# Patient Record
Sex: FEMALE | Race: AMERICAN INDIAN OR ALASKA NATIVE | HISPANIC OR LATINO | URBAN - METROPOLITAN AREA
[De-identification: names, ages, dates, MRNs, and addresses within clinical notes are randomized per-mention and may not be internally consistent; named-entity substitution may affect disease eponyms.]

---

## 2017-03-21 ENCOUNTER — EMERGENCY (EMERGENCY)
Facility: HOSPITAL | Age: 57
LOS: 1 days | Discharge: HOME | End: 2017-03-21
Attending: EMERGENCY MEDICINE | Admitting: EMERGENCY MEDICINE
Payer: COMMERCIAL

## 2017-03-21 VITALS
HEART RATE: 96 BPM | OXYGEN SATURATION: 96 % | WEIGHT: 179.9 LBS | DIASTOLIC BLOOD PRESSURE: 92 MMHG | TEMPERATURE: 98 F | RESPIRATION RATE: 18 BRPM | SYSTOLIC BLOOD PRESSURE: 137 MMHG

## 2017-03-21 DIAGNOSIS — Z98.89 OTHER SPECIFIED POSTPROCEDURAL STATES: Chronic | ICD-10-CM

## 2017-03-21 DIAGNOSIS — Z87.891 PERSONAL HISTORY OF NICOTINE DEPENDENCE: ICD-10-CM

## 2017-03-21 DIAGNOSIS — M79.602 PAIN IN LEFT ARM: ICD-10-CM

## 2017-03-21 DIAGNOSIS — Z90.49 ACQUIRED ABSENCE OF OTHER SPECIFIED PARTS OF DIGESTIVE TRACT: Chronic | ICD-10-CM

## 2017-03-21 DIAGNOSIS — Z91.041 RADIOGRAPHIC DYE ALLERGY STATUS: ICD-10-CM

## 2017-03-21 DIAGNOSIS — M25.571 PAIN IN RIGHT ANKLE AND JOINTS OF RIGHT FOOT: ICD-10-CM

## 2017-03-21 DIAGNOSIS — J45.909 UNSPECIFIED ASTHMA, UNCOMPLICATED: ICD-10-CM

## 2017-03-21 DIAGNOSIS — M79.671 PAIN IN RIGHT FOOT: ICD-10-CM

## 2017-03-21 DIAGNOSIS — M25.572 PAIN IN LEFT ANKLE AND JOINTS OF LEFT FOOT: ICD-10-CM

## 2017-03-21 DIAGNOSIS — R07.9 CHEST PAIN, UNSPECIFIED: ICD-10-CM

## 2017-03-21 DIAGNOSIS — M79.672 PAIN IN LEFT FOOT: ICD-10-CM

## 2017-03-21 LAB
ALBUMIN SERPL ELPH-MCNC: 3.8 G/DL — SIGNIFICANT CHANGE UP (ref 3.4–5)
ALP SERPL-CCNC: 93 U/L — SIGNIFICANT CHANGE UP (ref 40–120)
ALT FLD-CCNC: 29 U/L — SIGNIFICANT CHANGE UP (ref 12–42)
ANION GAP SERPL CALC-SCNC: 7 MMOL/L — LOW (ref 9–16)
APTT BLD: 33 SEC — SIGNIFICANT CHANGE UP (ref 27.5–37.4)
AST SERPL-CCNC: 14 U/L — LOW (ref 15–37)
BASOPHILS NFR BLD AUTO: 0.4 % — SIGNIFICANT CHANGE UP (ref 0–2)
BILIRUB SERPL-MCNC: 0.5 MG/DL — SIGNIFICANT CHANGE UP (ref 0.2–1.2)
BUN SERPL-MCNC: 13 MG/DL — SIGNIFICANT CHANGE UP (ref 7–23)
CALCIUM SERPL-MCNC: 8.8 MG/DL — SIGNIFICANT CHANGE UP (ref 8.5–10.5)
CHLORIDE SERPL-SCNC: 105 MMOL/L — SIGNIFICANT CHANGE UP (ref 96–108)
CK MB CFR SERPL CALC: 1.2 NG/ML — SIGNIFICANT CHANGE UP (ref 0.5–3.6)
CK SERPL-CCNC: 76 U/L — SIGNIFICANT CHANGE UP (ref 26–192)
CO2 SERPL-SCNC: 29 MMOL/L — SIGNIFICANT CHANGE UP (ref 22–31)
CREAT SERPL-MCNC: 0.54 MG/DL — SIGNIFICANT CHANGE UP (ref 0.5–1.3)
EOSINOPHIL NFR BLD AUTO: 2.6 % — SIGNIFICANT CHANGE UP (ref 0–6)
EXTRA SST TUBE: SIGNIFICANT CHANGE UP
GLUCOSE SERPL-MCNC: 109 MG/DL — HIGH (ref 70–99)
HCT VFR BLD CALC: 37.2 % — SIGNIFICANT CHANGE UP (ref 34.5–45)
HGB BLD-MCNC: 12.9 G/DL — SIGNIFICANT CHANGE UP (ref 11.5–15.5)
INR BLD: 1.03 — SIGNIFICANT CHANGE UP (ref 0.88–1.16)
LYMPHOCYTES # BLD AUTO: 32.5 % — SIGNIFICANT CHANGE UP (ref 13–44)
MCHC RBC-ENTMCNC: 30.2 PG — SIGNIFICANT CHANGE UP (ref 27–34)
MCHC RBC-ENTMCNC: 34.7 G/DL — SIGNIFICANT CHANGE UP (ref 32–36)
MCV RBC AUTO: 87.1 FL — SIGNIFICANT CHANGE UP (ref 80–100)
MONOCYTES NFR BLD AUTO: 8.7 % — SIGNIFICANT CHANGE UP (ref 2–14)
NEUTROPHILS NFR BLD AUTO: 55.8 % — SIGNIFICANT CHANGE UP (ref 43–77)
PLATELET # BLD AUTO: 265 K/UL — SIGNIFICANT CHANGE UP (ref 150–400)
POTASSIUM SERPL-MCNC: 4.3 MMOL/L — SIGNIFICANT CHANGE UP (ref 3.5–5.3)
POTASSIUM SERPL-SCNC: 4.3 MMOL/L — SIGNIFICANT CHANGE UP (ref 3.5–5.3)
PROT SERPL-MCNC: 7.9 G/DL — SIGNIFICANT CHANGE UP (ref 6.4–8.2)
PROTHROM AB SERPL-ACNC: 11.4 SEC — SIGNIFICANT CHANGE UP (ref 9.8–12.7)
RBC # BLD: 4.27 M/UL — SIGNIFICANT CHANGE UP (ref 3.8–5.2)
RBC # FLD: 12.9 % — SIGNIFICANT CHANGE UP (ref 10.3–16.9)
SODIUM SERPL-SCNC: 141 MMOL/L — SIGNIFICANT CHANGE UP (ref 135–145)
TROPONIN I SERPL-MCNC: <0.015 NG/ML — SIGNIFICANT CHANGE UP (ref 0.01–0.04)
WBC # BLD: 7 K/UL — SIGNIFICANT CHANGE UP (ref 3.8–10.5)
WBC # FLD AUTO: 7 K/UL — SIGNIFICANT CHANGE UP (ref 3.8–10.5)

## 2017-03-21 PROCEDURE — 71010: CPT | Mod: 26

## 2017-03-21 PROCEDURE — 85730 THROMBOPLASTIN TIME PARTIAL: CPT

## 2017-03-21 PROCEDURE — 71045 X-RAY EXAM CHEST 1 VIEW: CPT

## 2017-03-21 PROCEDURE — 93010 ELECTROCARDIOGRAM REPORT: CPT

## 2017-03-21 PROCEDURE — 82550 ASSAY OF CK (CPK): CPT

## 2017-03-21 PROCEDURE — 80053 COMPREHEN METABOLIC PANEL: CPT

## 2017-03-21 PROCEDURE — 93005 ELECTROCARDIOGRAM TRACING: CPT | Mod: 77

## 2017-03-21 PROCEDURE — 85025 COMPLETE CBC W/AUTO DIFF WBC: CPT

## 2017-03-21 PROCEDURE — 84484 ASSAY OF TROPONIN QUANT: CPT

## 2017-03-21 PROCEDURE — 99285 EMERGENCY DEPT VISIT HI MDM: CPT | Mod: 25

## 2017-03-21 PROCEDURE — 83036 HEMOGLOBIN GLYCOSYLATED A1C: CPT

## 2017-03-21 PROCEDURE — 93010 ELECTROCARDIOGRAM REPORT: CPT | Mod: 77

## 2017-03-21 PROCEDURE — 82553 CREATINE MB FRACTION: CPT

## 2017-03-21 PROCEDURE — 36415 COLL VENOUS BLD VENIPUNCTURE: CPT

## 2017-03-21 PROCEDURE — 99283 EMERGENCY DEPT VISIT LOW MDM: CPT | Mod: 25

## 2017-03-21 PROCEDURE — 85610 PROTHROMBIN TIME: CPT

## 2017-03-21 RX ORDER — SODIUM CHLORIDE 9 MG/ML
3 INJECTION INTRAMUSCULAR; INTRAVENOUS; SUBCUTANEOUS ONCE
Qty: 0 | Refills: 0 | Status: COMPLETED | OUTPATIENT
Start: 2017-03-21 | End: 2017-03-21

## 2017-03-21 RX ADMIN — SODIUM CHLORIDE 3 MILLILITER(S): 9 INJECTION INTRAMUSCULAR; INTRAVENOUS; SUBCUTANEOUS at 15:48

## 2017-03-21 NOTE — ED ADULT NURSE NOTE - OBJECTIVE STATEMENT
57 y/o female c/o intermittent midsternal chest pain radiating down the left arm and SOB on exertion for the last two days. pt took 3 PO ibuprofen this morning around 10am with minimal relief. pt also c/o bilateral foot pain for the least two months with swelling to bilateral outer ankles. denies any n/v/d, fever/chills, medica hx, daily medications, palpitations, headache. EKG done, placed on continuous cardiac monitor, no acute distress, ambulates independently. continue to monitor.

## 2017-03-21 NOTE — ED ADULT TRIAGE NOTE - CHIEF COMPLAINT QUOTE
patient complains of chest pressure radiating down left arm x2 days. patient took ibuprofen with no relief. also complains of pain in her feet. denies medical problems.

## 2017-03-21 NOTE — ED PROVIDER NOTE - MEDICAL DECISION MAKING DETAILS
Arm pain very unlikely cardiac as it is unrelated to exertion and all labs/EKG are unremarkable; likely secondary to cervical nerve issue. B/L heel pain likely 2/2 MSK issue and not nerve related. Both issues best addressed as outpatient by either pain management and/or podiatry.

## 2017-03-21 NOTE — ED ADULT NURSE NOTE - CHPI ED SYMPTOMS NEG
no syncope/no vomiting/no nausea/no fever/no dizziness/no chills/no back pain/no cough/no diaphoresis

## 2017-03-21 NOTE — ED PROVIDER NOTE - ATTENDING CONTRIBUTION TO CARE
56 F co L arm pain- had an episode of palpitations over the weekend for about 1 min  episodic shooting pain down her L arm- nonexertional  no exac/allev factors  no weakness  vss  s1s2 lungs cta bl  abd soft nt nd +bs  cervical strength 5/5 BL  IMP- Arm pain  ekg, labs  chronic heel pain

## 2017-03-21 NOTE — ED PROVIDER NOTE - OBJECTIVE STATEMENT
55 yo woman with hx of intermittent asthma presents with episodic shooting pain down left arm and b/l foot pain in heels. Pt feels the issues are unrelated. Two days ago, pt was shopping and had very brief episode of palpitations accompanied by mild chest pain. The palpation were intense and forced patient to stop what she was doing. The palpitation resolved in less than a minutes and patient returned to baseline. However, since the event she has had episodic, shooting pain/tingling down legs arm. Nothing triggers the pain; it can happen both at rest and with exertion. Nothing makes it worse or better.   The pain in the feet has been occurring for 2 months. 8/10 pain in heels and lateral ankles, most notable after

## 2017-12-15 ENCOUNTER — EMERGENCY (EMERGENCY)
Facility: HOSPITAL | Age: 57
LOS: 1 days | Discharge: HOME | End: 2017-12-15
Attending: EMERGENCY MEDICINE | Admitting: EMERGENCY MEDICINE
Payer: SELF-PAY

## 2017-12-15 VITALS
DIASTOLIC BLOOD PRESSURE: 87 MMHG | TEMPERATURE: 99 F | HEART RATE: 124 BPM | SYSTOLIC BLOOD PRESSURE: 145 MMHG | RESPIRATION RATE: 22 BRPM | OXYGEN SATURATION: 100 %

## 2017-12-15 VITALS
RESPIRATION RATE: 18 BRPM | TEMPERATURE: 99 F | SYSTOLIC BLOOD PRESSURE: 129 MMHG | OXYGEN SATURATION: 99 % | HEART RATE: 108 BPM | DIASTOLIC BLOOD PRESSURE: 85 MMHG

## 2017-12-15 DIAGNOSIS — Z91.041 RADIOGRAPHIC DYE ALLERGY STATUS: ICD-10-CM

## 2017-12-15 DIAGNOSIS — Z87.891 PERSONAL HISTORY OF NICOTINE DEPENDENCE: ICD-10-CM

## 2017-12-15 DIAGNOSIS — Z90.49 ACQUIRED ABSENCE OF OTHER SPECIFIED PARTS OF DIGESTIVE TRACT: Chronic | ICD-10-CM

## 2017-12-15 DIAGNOSIS — Z98.89 OTHER SPECIFIED POSTPROCEDURAL STATES: Chronic | ICD-10-CM

## 2017-12-15 DIAGNOSIS — J45.901 UNSPECIFIED ASTHMA WITH (ACUTE) EXACERBATION: ICD-10-CM

## 2017-12-15 DIAGNOSIS — J45.21 MILD INTERMITTENT ASTHMA WITH (ACUTE) EXACERBATION: ICD-10-CM

## 2017-12-15 PROCEDURE — 94640 AIRWAY INHALATION TREATMENT: CPT

## 2017-12-15 PROCEDURE — 71010: CPT | Mod: 26

## 2017-12-15 PROCEDURE — 99285 EMERGENCY DEPT VISIT HI MDM: CPT | Mod: 25

## 2017-12-15 PROCEDURE — 96375 TX/PRO/DX INJ NEW DRUG ADDON: CPT

## 2017-12-15 PROCEDURE — 96374 THER/PROPH/DIAG INJ IV PUSH: CPT

## 2017-12-15 PROCEDURE — 99285 EMERGENCY DEPT VISIT HI MDM: CPT

## 2017-12-15 PROCEDURE — 71045 X-RAY EXAM CHEST 1 VIEW: CPT

## 2017-12-15 RX ORDER — EPINEPHRINE 0.3 MG/.3ML
3 INJECTION INTRAMUSCULAR; SUBCUTANEOUS
Qty: 2 | Refills: 0
Start: 2017-12-15 | End: 2018-01-13

## 2017-12-15 RX ORDER — IPRATROPIUM/ALBUTEROL SULFATE 18-103MCG
3 AEROSOL WITH ADAPTER (GRAM) INHALATION
Qty: 0 | Refills: 0 | Status: COMPLETED | OUTPATIENT
Start: 2017-12-15 | End: 2017-12-15

## 2017-12-15 RX ORDER — ACETAMINOPHEN 500 MG
975 TABLET ORAL ONCE
Qty: 0 | Refills: 0 | Status: COMPLETED | OUTPATIENT
Start: 2017-12-15 | End: 2017-12-15

## 2017-12-15 RX ORDER — KETOROLAC TROMETHAMINE 30 MG/ML
15 SYRINGE (ML) INJECTION ONCE
Qty: 0 | Refills: 0 | Status: DISCONTINUED | OUTPATIENT
Start: 2017-12-15 | End: 2017-12-15

## 2017-12-15 RX ORDER — SODIUM CHLORIDE 9 MG/ML
1000 INJECTION INTRAMUSCULAR; INTRAVENOUS; SUBCUTANEOUS ONCE
Qty: 0 | Refills: 0 | Status: COMPLETED | OUTPATIENT
Start: 2017-12-15 | End: 2017-12-15

## 2017-12-15 RX ORDER — IBUPROFEN 200 MG
600 TABLET ORAL ONCE
Qty: 0 | Refills: 0 | Status: COMPLETED | OUTPATIENT
Start: 2017-12-15 | End: 2017-12-15

## 2017-12-15 RX ORDER — MAGNESIUM SULFATE 500 MG/ML
2 VIAL (ML) INJECTION ONCE
Qty: 0 | Refills: 0 | Status: COMPLETED | OUTPATIENT
Start: 2017-12-15 | End: 2017-12-15

## 2017-12-15 RX ORDER — METOCLOPRAMIDE HCL 10 MG
10 TABLET ORAL ONCE
Qty: 0 | Refills: 0 | Status: COMPLETED | OUTPATIENT
Start: 2017-12-15 | End: 2017-12-15

## 2017-12-15 RX ADMIN — Medication 3 MILLILITER(S): at 22:20

## 2017-12-15 RX ADMIN — Medication 125 MILLIGRAM(S): at 21:44

## 2017-12-15 RX ADMIN — SODIUM CHLORIDE 2000 MILLILITER(S): 9 INJECTION INTRAMUSCULAR; INTRAVENOUS; SUBCUTANEOUS at 21:44

## 2017-12-15 RX ADMIN — Medication 3 MILLILITER(S): at 21:32

## 2017-12-15 RX ADMIN — Medication 600 MILLIGRAM(S): at 23:03

## 2017-12-15 RX ADMIN — Medication 975 MILLIGRAM(S): at 23:04

## 2017-12-15 RX ADMIN — Medication 3 MILLILITER(S): at 21:47

## 2017-12-15 RX ADMIN — Medication 50 GRAM(S): at 21:44

## 2017-12-15 NOTE — ED ADULT NURSE NOTE - OBJECTIVE STATEMENT
Received pt from triage, ambulatory with steady gait, family member at bedside. Not in distress. cc of Asthma exacerbation - PMH of Asthma. No respiratory distress noted. Reported +SOB x 1 day. Started nebulization. OE=579.

## 2017-12-15 NOTE — ED PROVIDER NOTE - MEDICAL DECISION MAKING DETAILS
No infiltrate on CXR or clinical concern for PNA, wheezing and air movement improved after nebs, mag, steroids. [place] though combi nebs pt complained of frontal sinus headache which was relieved with toradol/reglan, no focal neuro sx or visual changes. Pt sent on steroids and albuterol, will f/u in clinic for further longterm management of asthma. Given return precautions and anticipatory guidance. No infiltrate on CXR or clinical concern for PNA, wheezing and air movement improved after nebs, mag, steroids. [place] though combi nebs pt complained of frontal sinus headache which was relieved with toradol/reglan, no focal neuro sx or visual changes. Pt sent on steroids and albuterol, will f/u in clinic for further longterm management of asthma. Given return precautions and anticipatory guidance. Sinus tach attributed to frequent albuterol use vs possible low grade fever from what is likely a viral syndrome.

## 2017-12-15 NOTE — ED ADULT NURSE NOTE - CCCP TRG CHIEF CMPLNT
[facility] Call Center    Phone Message    Name of Caller: Maria M Salmon    Phone Number: Home number on file [phone] (home)    Best time to return call: anytime    May a detailed message be left on voicemail: yes    Relation to patient: Self    Reason for Call: Other: Pt called about prescription not being covered by [employer]     Action Taken: Message routed to:  Adult Clinics: Endocrinology p [id number]   asthma exacerbation

## 2017-12-15 NOTE — ED PROVIDER NOTE - OBJECTIVE STATEMENT
56 y/o F former smoker w/asthma only using albuterol inhaler occasionally, no chronic medications, p/w several days of rhinorrhea, frontal sinus pressure vs HA with chest tightness, taking albuerol inhaler several times per day w/temporary relief, normally using albuterol inhaler maybe 1x per week. No fever/chills. Was able to perform at work today but breathing got worse after work when exposed to the cold air. No CP. No abd pain or n/v. No travel or sick contacts.

## 2017-12-15 NOTE — ED PROVIDER NOTE - PHYSICAL EXAMINATION
VITAL SIGNS: I have reviewed nursing notes and confirm.  CONSTITUTIONAL: Well-developed; well-nourished female comfortably speaking in complete sentences w/out evidence of air hunger; in no acute distress.  SKIN: Agree with RN documentation regarding decubitus evaluation. Remainder of skin exam is warm and dry, no acute rash.  HEAD: Normocephalic; atraumatic.  EYES: PERRL, EOM intact; conjunctiva and sclera clear.  ENT: No nasal discharge; airway clear.  NECK: Supple; non tender.  CARD: S1, S2 normal; no murmurs, gallops, or rubs. + tachycardic to 110  RESP: + end exp wheeze all lung fields w/good excursion, no tachpynea or increased work of breathing  ABD: Normal bowel sounds; soft; non-distended; non-tender; no hepatosplenomegaly.  EXT: Normal ROM. No clubbing, cyanosis or edema.  LYMPH: No acute cervical adenopathy.  NEURO: Alert, oriented. Grossly unremarkable.  PSYCH: Cooperative, appropriate.

## 2018-08-07 PROBLEM — Z00.00 ENCOUNTER FOR PREVENTIVE HEALTH EXAMINATION: Status: ACTIVE | Noted: 2018-08-07

## 2018-08-10 ENCOUNTER — APPOINTMENT (OUTPATIENT)
Dept: BREAST CENTER | Facility: CLINIC | Age: 58
End: 2018-08-10

## 2018-11-18 ENCOUNTER — EMERGENCY (EMERGENCY)
Facility: HOSPITAL | Age: 58
LOS: 1 days | Discharge: HOME | End: 2018-11-18
Attending: EMERGENCY MEDICINE | Admitting: EMERGENCY MEDICINE
Payer: COMMERCIAL

## 2018-11-18 VITALS
HEART RATE: 128 BPM | SYSTOLIC BLOOD PRESSURE: 146 MMHG | TEMPERATURE: 98 F | RESPIRATION RATE: 22 BRPM | DIASTOLIC BLOOD PRESSURE: 89 MMHG | OXYGEN SATURATION: 98 %

## 2018-11-18 DIAGNOSIS — Z79.899 OTHER LONG TERM (CURRENT) DRUG THERAPY: ICD-10-CM

## 2018-11-18 DIAGNOSIS — Z79.52 LONG TERM (CURRENT) USE OF SYSTEMIC STEROIDS: ICD-10-CM

## 2018-11-18 DIAGNOSIS — J45.909 UNSPECIFIED ASTHMA, UNCOMPLICATED: ICD-10-CM

## 2018-11-18 DIAGNOSIS — Z90.49 ACQUIRED ABSENCE OF OTHER SPECIFIED PARTS OF DIGESTIVE TRACT: Chronic | ICD-10-CM

## 2018-11-18 DIAGNOSIS — R06.02 SHORTNESS OF BREATH: ICD-10-CM

## 2018-11-18 DIAGNOSIS — Z91.041 RADIOGRAPHIC DYE ALLERGY STATUS: ICD-10-CM

## 2018-11-18 DIAGNOSIS — M79.89 OTHER SPECIFIED SOFT TISSUE DISORDERS: ICD-10-CM

## 2018-11-18 DIAGNOSIS — Z98.89 OTHER SPECIFIED POSTPROCEDURAL STATES: Chronic | ICD-10-CM

## 2018-11-18 LAB
BASOPHILS NFR BLD AUTO: 0.3 % — SIGNIFICANT CHANGE UP (ref 0–2)
EOSINOPHIL NFR BLD AUTO: 0.6 % — SIGNIFICANT CHANGE UP (ref 0–6)
HCT VFR BLD CALC: 40.1 % — SIGNIFICANT CHANGE UP (ref 34.5–45)
HGB BLD-MCNC: 13.9 G/DL — SIGNIFICANT CHANGE UP (ref 11.5–15.5)
LYMPHOCYTES # BLD AUTO: 14.3 % — SIGNIFICANT CHANGE UP (ref 13–44)
MCHC RBC-ENTMCNC: 29.6 PG — SIGNIFICANT CHANGE UP (ref 27–34)
MCHC RBC-ENTMCNC: 34.7 G/DL — SIGNIFICANT CHANGE UP (ref 32–36)
MCV RBC AUTO: 85.5 FL — SIGNIFICANT CHANGE UP (ref 80–100)
MONOCYTES NFR BLD AUTO: 7 % — SIGNIFICANT CHANGE UP (ref 2–14)
NEUTROPHILS NFR BLD AUTO: 77.8 % — HIGH (ref 43–77)
PLATELET # BLD AUTO: 302 K/UL — SIGNIFICANT CHANGE UP (ref 150–400)
RBC # BLD: 4.69 M/UL — SIGNIFICANT CHANGE UP (ref 3.8–5.2)
RBC # FLD: 12.6 % — SIGNIFICANT CHANGE UP (ref 10.3–16.9)
WBC # BLD: 14 K/UL — HIGH (ref 3.8–10.5)
WBC # FLD AUTO: 14 K/UL — HIGH (ref 3.8–10.5)

## 2018-11-18 PROCEDURE — 71045 X-RAY EXAM CHEST 1 VIEW: CPT | Mod: 26

## 2018-11-18 PROCEDURE — 99285 EMERGENCY DEPT VISIT HI MDM: CPT | Mod: 25

## 2018-11-18 RX ORDER — IPRATROPIUM/ALBUTEROL SULFATE 18-103MCG
3 AEROSOL WITH ADAPTER (GRAM) INHALATION ONCE
Qty: 0 | Refills: 0 | Status: COMPLETED | OUTPATIENT
Start: 2018-11-18 | End: 2018-11-18

## 2018-11-18 RX ORDER — SODIUM CHLORIDE 9 MG/ML
1000 INJECTION INTRAMUSCULAR; INTRAVENOUS; SUBCUTANEOUS ONCE
Qty: 0 | Refills: 0 | Status: COMPLETED | OUTPATIENT
Start: 2018-11-18 | End: 2018-11-18

## 2018-11-18 NOTE — ED PROVIDER NOTE - ATTENDING CONTRIBUTION TO CARE
Attending Statement: I have personally performed a face to face diagnostic evaluation on this patient. I have reviewed the ACP note and agree with the history, exam and plan of care, except as noted.     Attending Contribution to Care:  58F with PMHx of asthma who p/w chest tightness and SOB x 1 week, associated with feeling like her left arm is swollen, pt noted to be tachycardic at triage which is improved with IV hydration. EKG no stemi, Labs including d-dimer and troponin negative, US of LUE shows no thrombus.  Pt feels better after treatment of asthma exacerbation. The patient is stable for DC. They were advised to call their PMD for prompt outpatient follow up. Return precautions were discussed. The patient was advised to return to the ER for any concerning or worsening symptoms.

## 2018-11-18 NOTE — ED PROVIDER NOTE - OBJECTIVE STATEMENT
57 y/o f hx asthma presents stating her asthma has been bothering her for the last week, having chest tightness and wheezing which is only slightly improved with albuterol inhaler.  Pt stating today, she developed pain to mid and left side of her chest which moves to her left arm.  Pt stating her left arm has been bothering her for the past month on and off, notices it being swollen just above her elbow.  Pt denies fever, chills, recent travel sick contacts, n/v/d, sore throat, nasal congestion, recent travel/surgeries, exogenous estrogen, leg swelling, all other ROS negative.

## 2018-11-18 NOTE — ED ADULT NURSE NOTE - NSIMPLEMENTINTERV_GEN_ALL_ED
Implemented All Universal Safety Interventions:  [place] to call system. Call bell, personal items and telephone within reach. Instruct patient to call for assistance. Room bathroom lighting operational. Non-slip footwear when patient is off stretcher. Physically safe environment: no spills, clutter or unnecessary equipment. Stretcher in lowest position, wheels locked, appropriate side rails in place.

## 2018-11-18 NOTE — ED ADULT NURSE NOTE - OBJECTIVE STATEMENT
59 y/o female c/o sob, left chest and arm pain. pt reports cp is intermittent and comes at rest. pt reports pain in arm first then comes cp. pt reports history of asthma, took pump but still feels sob. Pt denies fever, nausea or vomiting. pt. has no cough. pt speaks clear, MAEx4, ambulates steady, unlabored breathing at this time, w/ right lower lung wheeze. Abd obese soft nt nd. Skin dry, warm.

## 2018-11-18 NOTE — ED PROVIDER NOTE - MEDICAL DECISION MAKING DETAILS
57 y/o f hx asthma (never intubated) presents c/o chest tightness, wheezing for the past week, today with CP and left arm pain; pt tachycardic at triage which is improved during bedside exam, will plan to repeat vs.  Mild wheezing on exam, will give neb, steroids.  Plan for labs and cxr, IV hydration.  Pt with no risk factors for PE which is less likely.  Will get EKG, observe for improvement with breathing and tachycardia.  Left arm with ?mild swelling, will get u/s to r/o dvt.

## 2018-11-18 NOTE — ED PROVIDER NOTE - MUSCULOSKELETAL, MLM
left arm +mild swelling just proximal to elbow, no bony tenderness, no erythema or skin discoloration, range of motion is not limited, no muscle or joint tenderness

## 2018-11-18 NOTE — ED ADULT TRIAGE NOTE - ARRIVAL INFO ADDITIONAL COMMENTS
pt c/o asthma attack all day despite using inhalers.  also c/o 1 hour of sharp left arm pain.  left lung diminished. pt c/o asthma attack all day despite using inhalers.  also c/o 1 hour of sharp left arm pain.  right lung diminished.

## 2018-11-19 VITALS
SYSTOLIC BLOOD PRESSURE: 127 MMHG | TEMPERATURE: 98 F | RESPIRATION RATE: 18 BRPM | DIASTOLIC BLOOD PRESSURE: 79 MMHG | HEART RATE: 82 BPM | OXYGEN SATURATION: 97 %

## 2018-11-19 LAB
ALBUMIN SERPL ELPH-MCNC: 4.5 G/DL — SIGNIFICANT CHANGE UP (ref 3.3–5)
ALP SERPL-CCNC: 103 U/L — SIGNIFICANT CHANGE UP (ref 40–120)
ALT FLD-CCNC: 24 U/L — SIGNIFICANT CHANGE UP (ref 10–45)
ANION GAP SERPL CALC-SCNC: 16 MMOL/L — SIGNIFICANT CHANGE UP (ref 5–17)
AST SERPL-CCNC: 18 U/L — SIGNIFICANT CHANGE UP (ref 10–40)
BILIRUB SERPL-MCNC: 0.4 MG/DL — SIGNIFICANT CHANGE UP (ref 0.2–1.2)
BUN SERPL-MCNC: 21 MG/DL — SIGNIFICANT CHANGE UP (ref 7–23)
CALCIUM SERPL-MCNC: 9.5 MG/DL — SIGNIFICANT CHANGE UP (ref 8.4–10.5)
CHLORIDE SERPL-SCNC: 97 MMOL/L — SIGNIFICANT CHANGE UP (ref 96–108)
CK MB CFR SERPL CALC: 1.2 NG/ML — SIGNIFICANT CHANGE UP (ref 0–6.7)
CO2 SERPL-SCNC: 24 MMOL/L — SIGNIFICANT CHANGE UP (ref 22–31)
CREAT SERPL-MCNC: 0.82 MG/DL — SIGNIFICANT CHANGE UP (ref 0.5–1.3)
GLUCOSE SERPL-MCNC: 108 MG/DL — HIGH (ref 70–99)
POTASSIUM SERPL-MCNC: 4.5 MMOL/L — SIGNIFICANT CHANGE UP (ref 3.5–5.3)
POTASSIUM SERPL-SCNC: 4.5 MMOL/L — SIGNIFICANT CHANGE UP (ref 3.5–5.3)
PROT SERPL-MCNC: 8.8 G/DL — HIGH (ref 6–8.3)
SODIUM SERPL-SCNC: 137 MMOL/L — SIGNIFICANT CHANGE UP (ref 135–145)
TROPONIN T SERPL-MCNC: <0.01 NG/ML — SIGNIFICANT CHANGE UP (ref 0–0.01)

## 2018-11-19 PROCEDURE — 82550 ASSAY OF CK (CPK): CPT

## 2018-11-19 PROCEDURE — 94640 AIRWAY INHALATION TREATMENT: CPT

## 2018-11-19 PROCEDURE — 85025 COMPLETE CBC W/AUTO DIFF WBC: CPT

## 2018-11-19 PROCEDURE — 82553 CREATINE MB FRACTION: CPT

## 2018-11-19 PROCEDURE — 84484 ASSAY OF TROPONIN QUANT: CPT

## 2018-11-19 PROCEDURE — 36415 COLL VENOUS BLD VENIPUNCTURE: CPT

## 2018-11-19 PROCEDURE — 80053 COMPREHEN METABOLIC PANEL: CPT

## 2018-11-19 PROCEDURE — 71045 X-RAY EXAM CHEST 1 VIEW: CPT

## 2018-11-19 PROCEDURE — 99284 EMERGENCY DEPT VISIT MOD MDM: CPT | Mod: 25

## 2018-11-19 PROCEDURE — 85379 FIBRIN DEGRADATION QUANT: CPT

## 2018-11-19 PROCEDURE — 96374 THER/PROPH/DIAG INJ IV PUSH: CPT

## 2018-11-19 PROCEDURE — 96361 HYDRATE IV INFUSION ADD-ON: CPT

## 2018-11-19 PROCEDURE — 93971 EXTREMITY STUDY: CPT

## 2018-11-19 PROCEDURE — 93005 ELECTROCARDIOGRAM TRACING: CPT

## 2018-11-19 PROCEDURE — 93971 EXTREMITY STUDY: CPT | Mod: 26,LT

## 2018-11-19 RX ORDER — KETOROLAC TROMETHAMINE 30 MG/ML
30 SYRINGE (ML) INJECTION ONCE
Qty: 0 | Refills: 0 | Status: DISCONTINUED | OUTPATIENT
Start: 2018-11-19 | End: 2018-11-19

## 2018-11-19 RX ORDER — IPRATROPIUM/ALBUTEROL SULFATE 18-103MCG
3 AEROSOL WITH ADAPTER (GRAM) INHALATION ONCE
Qty: 0 | Refills: 0 | Status: COMPLETED | OUTPATIENT
Start: 2018-11-19 | End: 2018-11-19

## 2018-11-19 RX ORDER — SODIUM CHLORIDE 9 MG/ML
1000 INJECTION INTRAMUSCULAR; INTRAVENOUS; SUBCUTANEOUS ONCE
Qty: 0 | Refills: 0 | Status: COMPLETED | OUTPATIENT
Start: 2018-11-19 | End: 2018-11-19

## 2018-11-19 RX ORDER — ALBUTEROL 90 UG/1
2 AEROSOL, METERED ORAL
Qty: 8 | Refills: 0
Start: 2018-11-19 | End: 2018-12-18

## 2018-11-19 RX ADMIN — Medication 30 MILLIGRAM(S): at 00:48

## 2018-11-19 RX ADMIN — Medication 60 MILLIGRAM(S): at 00:06

## 2018-11-19 RX ADMIN — Medication 3 MILLILITER(S): at 01:15

## 2018-11-19 RX ADMIN — SODIUM CHLORIDE 1000 MILLILITER(S): 9 INJECTION INTRAMUSCULAR; INTRAVENOUS; SUBCUTANEOUS at 01:15

## 2018-11-19 RX ADMIN — Medication 30 MILLIGRAM(S): at 01:15

## 2018-11-19 RX ADMIN — Medication 3 MILLILITER(S): at 00:06

## 2018-11-19 RX ADMIN — SODIUM CHLORIDE 1000 MILLILITER(S): 9 INJECTION INTRAMUSCULAR; INTRAVENOUS; SUBCUTANEOUS at 01:14

## 2018-11-19 RX ADMIN — SODIUM CHLORIDE 1000 MILLILITER(S): 9 INJECTION INTRAMUSCULAR; INTRAVENOUS; SUBCUTANEOUS at 00:07

## 2019-01-11 ENCOUNTER — EMERGENCY (EMERGENCY)
Facility: HOSPITAL | Age: 59
LOS: 1 days | Discharge: HOME | End: 2019-01-11
Attending: EMERGENCY MEDICINE | Admitting: EMERGENCY MEDICINE
Payer: COMMERCIAL

## 2019-01-11 VITALS
SYSTOLIC BLOOD PRESSURE: 136 MMHG | WEIGHT: 179.46 LBS | OXYGEN SATURATION: 97 % | TEMPERATURE: 99 F | DIASTOLIC BLOOD PRESSURE: 84 MMHG | RESPIRATION RATE: 16 BRPM | HEART RATE: 101 BPM

## 2019-01-11 VITALS
OXYGEN SATURATION: 95 % | HEART RATE: 89 BPM | TEMPERATURE: 98 F | RESPIRATION RATE: 17 BRPM | DIASTOLIC BLOOD PRESSURE: 68 MMHG | SYSTOLIC BLOOD PRESSURE: 102 MMHG

## 2019-01-11 DIAGNOSIS — Z79.52 LONG TERM (CURRENT) USE OF SYSTEMIC STEROIDS: ICD-10-CM

## 2019-01-11 DIAGNOSIS — R10.32 LEFT LOWER QUADRANT PAIN: ICD-10-CM

## 2019-01-11 DIAGNOSIS — Z90.49 ACQUIRED ABSENCE OF OTHER SPECIFIED PARTS OF DIGESTIVE TRACT: Chronic | ICD-10-CM

## 2019-01-11 DIAGNOSIS — Z79.899 OTHER LONG TERM (CURRENT) DRUG THERAPY: ICD-10-CM

## 2019-01-11 DIAGNOSIS — Z91.040 LATEX ALLERGY STATUS: ICD-10-CM

## 2019-01-11 DIAGNOSIS — Z98.89 OTHER SPECIFIED POSTPROCEDURAL STATES: Chronic | ICD-10-CM

## 2019-01-11 DIAGNOSIS — K57.92 DIVERTICULITIS OF INTESTINE, PART UNSPECIFIED, WITHOUT PERFORATION OR ABSCESS WITHOUT BLEEDING: ICD-10-CM

## 2019-01-11 LAB
ALBUMIN SERPL ELPH-MCNC: 4.3 G/DL — SIGNIFICANT CHANGE UP (ref 3.3–5)
ALP SERPL-CCNC: 101 U/L — SIGNIFICANT CHANGE UP (ref 40–120)
ALT FLD-CCNC: 15 U/L — SIGNIFICANT CHANGE UP (ref 10–45)
ANION GAP SERPL CALC-SCNC: 15 MMOL/L — SIGNIFICANT CHANGE UP (ref 5–17)
APPEARANCE UR: CLEAR — SIGNIFICANT CHANGE UP
AST SERPL-CCNC: 11 U/L — SIGNIFICANT CHANGE UP (ref 10–40)
BACTERIA # UR AUTO: PRESENT /HPF
BASOPHILS NFR BLD AUTO: 0.3 % — SIGNIFICANT CHANGE UP (ref 0–2)
BILIRUB SERPL-MCNC: 0.9 MG/DL — SIGNIFICANT CHANGE UP (ref 0.2–1.2)
BILIRUB UR-MCNC: NEGATIVE — SIGNIFICANT CHANGE UP
BUN SERPL-MCNC: 12 MG/DL — SIGNIFICANT CHANGE UP (ref 7–23)
CALCIUM SERPL-MCNC: 9.4 MG/DL — SIGNIFICANT CHANGE UP (ref 8.4–10.5)
CHLORIDE SERPL-SCNC: 98 MMOL/L — SIGNIFICANT CHANGE UP (ref 96–108)
CO2 SERPL-SCNC: 26 MMOL/L — SIGNIFICANT CHANGE UP (ref 22–31)
COLOR SPEC: YELLOW — SIGNIFICANT CHANGE UP
COMMENT - URINE: SIGNIFICANT CHANGE UP
CREAT SERPL-MCNC: 0.68 MG/DL — SIGNIFICANT CHANGE UP (ref 0.5–1.3)
DIFF PNL FLD: NEGATIVE — SIGNIFICANT CHANGE UP
EOSINOPHIL NFR BLD AUTO: 1.2 % — SIGNIFICANT CHANGE UP (ref 0–6)
EPI CELLS # UR: SIGNIFICANT CHANGE UP /HPF (ref 0–5)
GLUCOSE SERPL-MCNC: 152 MG/DL — HIGH (ref 70–99)
GLUCOSE UR QL: NEGATIVE — SIGNIFICANT CHANGE UP
HCT VFR BLD CALC: 42.2 % — SIGNIFICANT CHANGE UP (ref 34.5–45)
HGB BLD-MCNC: 14.4 G/DL — SIGNIFICANT CHANGE UP (ref 11.5–15.5)
HYALINE CASTS # UR AUTO: ABNORMAL /LPF (ref 0–2)
KETONES UR-MCNC: NEGATIVE — SIGNIFICANT CHANGE UP
LACTATE SERPL-SCNC: 1.6 MMOL/L — SIGNIFICANT CHANGE UP (ref 0.5–2)
LEUKOCYTE ESTERASE UR-ACNC: NEGATIVE — SIGNIFICANT CHANGE UP
LIDOCAIN IGE QN: 30 U/L — SIGNIFICANT CHANGE UP (ref 7–60)
LYMPHOCYTES # BLD AUTO: 18.4 % — SIGNIFICANT CHANGE UP (ref 13–44)
MCHC RBC-ENTMCNC: 29.3 PG — SIGNIFICANT CHANGE UP (ref 27–34)
MCHC RBC-ENTMCNC: 34.1 G/DL — SIGNIFICANT CHANGE UP (ref 32–36)
MCV RBC AUTO: 85.9 FL — SIGNIFICANT CHANGE UP (ref 80–100)
MONOCYTES NFR BLD AUTO: 8.5 % — SIGNIFICANT CHANGE UP (ref 2–14)
NEUTROPHILS NFR BLD AUTO: 71.6 % — SIGNIFICANT CHANGE UP (ref 43–77)
NITRITE UR-MCNC: NEGATIVE — SIGNIFICANT CHANGE UP
PH UR: 5.5 — SIGNIFICANT CHANGE UP (ref 5–8)
PLATELET # BLD AUTO: 303 K/UL — SIGNIFICANT CHANGE UP (ref 150–400)
POTASSIUM SERPL-MCNC: 4.2 MMOL/L — SIGNIFICANT CHANGE UP (ref 3.5–5.3)
POTASSIUM SERPL-SCNC: 4.2 MMOL/L — SIGNIFICANT CHANGE UP (ref 3.5–5.3)
PROT SERPL-MCNC: 9 G/DL — HIGH (ref 6–8.3)
PROT UR-MCNC: ABNORMAL MG/DL
RBC # BLD: 4.91 M/UL — SIGNIFICANT CHANGE UP (ref 3.8–5.2)
RBC # FLD: 12.9 % — SIGNIFICANT CHANGE UP (ref 10.3–16.9)
RBC CASTS # UR COMP ASSIST: < 5 /HPF — SIGNIFICANT CHANGE UP
SODIUM SERPL-SCNC: 139 MMOL/L — SIGNIFICANT CHANGE UP (ref 135–145)
SP GR SPEC: >=1.03 — SIGNIFICANT CHANGE UP (ref 1–1.03)
UROBILINOGEN FLD QL: 0.2 E.U./DL — SIGNIFICANT CHANGE UP
WBC # BLD: 9.9 K/UL — SIGNIFICANT CHANGE UP (ref 3.8–10.5)
WBC # FLD AUTO: 9.9 K/UL — SIGNIFICANT CHANGE UP (ref 3.8–10.5)
WBC UR QL: < 5 /HPF — SIGNIFICANT CHANGE UP

## 2019-01-11 PROCEDURE — 36415 COLL VENOUS BLD VENIPUNCTURE: CPT

## 2019-01-11 PROCEDURE — 81001 URINALYSIS AUTO W/SCOPE: CPT

## 2019-01-11 PROCEDURE — 80053 COMPREHEN METABOLIC PANEL: CPT

## 2019-01-11 PROCEDURE — 99284 EMERGENCY DEPT VISIT MOD MDM: CPT | Mod: 25

## 2019-01-11 PROCEDURE — 74176 CT ABD & PELVIS W/O CONTRAST: CPT | Mod: 26

## 2019-01-11 PROCEDURE — 83605 ASSAY OF LACTIC ACID: CPT

## 2019-01-11 PROCEDURE — 99284 EMERGENCY DEPT VISIT MOD MDM: CPT

## 2019-01-11 PROCEDURE — 96374 THER/PROPH/DIAG INJ IV PUSH: CPT

## 2019-01-11 PROCEDURE — 96375 TX/PRO/DX INJ NEW DRUG ADDON: CPT

## 2019-01-11 PROCEDURE — 85025 COMPLETE CBC W/AUTO DIFF WBC: CPT

## 2019-01-11 PROCEDURE — 74176 CT ABD & PELVIS W/O CONTRAST: CPT

## 2019-01-11 PROCEDURE — 83690 ASSAY OF LIPASE: CPT

## 2019-01-11 RX ORDER — SODIUM CHLORIDE 9 MG/ML
1000 INJECTION INTRAMUSCULAR; INTRAVENOUS; SUBCUTANEOUS ONCE
Qty: 0 | Refills: 0 | Status: COMPLETED | OUTPATIENT
Start: 2019-01-11 | End: 2019-01-11

## 2019-01-11 RX ORDER — KETOROLAC TROMETHAMINE 30 MG/ML
30 SYRINGE (ML) INJECTION ONCE
Qty: 0 | Refills: 0 | Status: DISCONTINUED | OUTPATIENT
Start: 2019-01-11 | End: 2019-01-11

## 2019-01-11 RX ORDER — METRONIDAZOLE 500 MG
1 TABLET ORAL
Qty: 21 | Refills: 0
Start: 2019-01-11 | End: 2019-01-17

## 2019-01-11 RX ORDER — CIPROFLOXACIN LACTATE 400MG/40ML
400 VIAL (ML) INTRAVENOUS ONCE
Qty: 0 | Refills: 0 | Status: COMPLETED | OUTPATIENT
Start: 2019-01-11 | End: 2019-01-11

## 2019-01-11 RX ORDER — METRONIDAZOLE 500 MG
500 TABLET ORAL ONCE
Qty: 0 | Refills: 0 | Status: COMPLETED | OUTPATIENT
Start: 2019-01-11 | End: 2019-01-11

## 2019-01-11 RX ORDER — MOXIFLOXACIN HYDROCHLORIDE TABLETS, 400 MG 400 MG/1
1 TABLET, FILM COATED ORAL
Qty: 20 | Refills: 0
Start: 2019-01-11 | End: 2019-01-20

## 2019-01-11 RX ORDER — IOHEXOL 300 MG/ML
30 INJECTION, SOLUTION INTRAVENOUS ONCE
Qty: 0 | Refills: 0 | Status: COMPLETED | OUTPATIENT
Start: 2019-01-11 | End: 2019-01-11

## 2019-01-11 RX ADMIN — Medication 30 MILLIGRAM(S): at 12:52

## 2019-01-11 RX ADMIN — Medication 200 MILLIGRAM(S): at 15:13

## 2019-01-11 RX ADMIN — SODIUM CHLORIDE 2000 MILLILITER(S): 9 INJECTION INTRAMUSCULAR; INTRAVENOUS; SUBCUTANEOUS at 12:38

## 2019-01-11 RX ADMIN — IOHEXOL 30 MILLILITER(S): 300 INJECTION, SOLUTION INTRAVENOUS at 12:15

## 2019-01-11 RX ADMIN — Medication 100 MILLIGRAM(S): at 15:47

## 2019-01-11 NOTE — ED ADULT NURSE NOTE - NSIMPLEMENTINTERV_GEN_ALL_ED
Implemented All Universal Safety Interventions:  [place] to call system. Call bell, personal items and telephone within reach. Instruct patient to call for assistance. Room bathroom lighting operational. Non-slip footwear when patient is off stretcher. Physically safe environment: no spills, clutter or unnecessary equipment. Stretcher in lowest position, wheels locked, appropriate side rails in place.

## 2019-01-11 NOTE — ED PROVIDER NOTE - CARE PROVIDER_API CALL
Fab Mathews), Medicine  [address] [facility] [address]  [place] [address]  Phone: [phone]  Fax: [phone]

## 2019-01-11 NOTE — ED PROVIDER NOTE - CARE PROVIDERS DIRECT ADDRESSES
[email or web address]

## 2019-01-11 NOTE — ED PROVIDER NOTE - MEDICAL DECISION MAKING DETAILS
LLQ pain and fever and chills. Pain improved with toradol in ED. labs noted. no elevated WBC. a febrile. CT + acute sigmoid diverticulitis. results reviewed with radiology and no obstruction or perforation. f/u with gi. will tx with cipro and flagyl

## 2019-01-11 NOTE — ED ADULT NURSE NOTE - OBJECTIVE STATEMENT
59 y/o female with hx of asthma, diverticulitis c/o abd pain x 3 days. pt states fever, chills, and LLQ abd pain. Pt states sharp intermittent pain. Pt reports tempt 102 at home yesterday. pt did not take any medication for fever today. no diarrhea or constipation. no urinary sx's. no back pain. no ha or dizziness. no further complaints.

## 2019-01-11 NOTE — ED PROVIDER NOTE - OBJECTIVE STATEMENT
57 y/o female with hx of asthma, diverticulitis c/o abd pain x 3 days. pt states fever, chills, and LLQ abd pain. Pt states sharp intermittent pain. Pt reports tempt 102 at home yesterday. pt did not take any medication for fever today. no diarrhea or constipation. no urinary sx's. no back pain. no ha or dizziness. no further complaints.

## 2019-01-11 NOTE — ED PROVIDER NOTE - ATTENDING CONTRIBUTION TO CARE
pt seen and examined by me, key points of case angela CARABALLO.  57yo female with hx of diverticulitis co a few days of llq abd pain, fever yesterday.  no vomiting.  on exam, tender llq, no rebound or guarding.  vitals mild tachy, no fever.  wbc 9.9.  ct shows uncomplicated diverticulitis.  pt pain is mild.  will go home with abx and fu pmd or GI

## 2019-03-11 ENCOUNTER — EMERGENCY (EMERGENCY)
Facility: HOSPITAL | Age: 59
LOS: 1 days | Discharge: HOME | End: 2019-03-11
Attending: EMERGENCY MEDICINE | Admitting: EMERGENCY MEDICINE
Payer: COMMERCIAL

## 2019-03-11 VITALS
SYSTOLIC BLOOD PRESSURE: 109 MMHG | TEMPERATURE: 98 F | RESPIRATION RATE: 18 BRPM | DIASTOLIC BLOOD PRESSURE: 78 MMHG | HEART RATE: 93 BPM | OXYGEN SATURATION: 98 %

## 2019-03-11 VITALS
HEART RATE: 90 BPM | TEMPERATURE: 97 F | OXYGEN SATURATION: 99 % | SYSTOLIC BLOOD PRESSURE: 133 MMHG | RESPIRATION RATE: 16 BRPM | WEIGHT: 180.56 LBS | DIASTOLIC BLOOD PRESSURE: 94 MMHG

## 2019-03-11 DIAGNOSIS — Z98.89 OTHER SPECIFIED POSTPROCEDURAL STATES: Chronic | ICD-10-CM

## 2019-03-11 DIAGNOSIS — Z90.49 ACQUIRED ABSENCE OF OTHER SPECIFIED PARTS OF DIGESTIVE TRACT: Chronic | ICD-10-CM

## 2019-03-11 PROCEDURE — 71046 X-RAY EXAM CHEST 2 VIEWS: CPT | Mod: 26

## 2019-03-11 PROCEDURE — 99284 EMERGENCY DEPT VISIT MOD MDM: CPT | Mod: 25

## 2019-03-11 PROCEDURE — 94640 AIRWAY INHALATION TREATMENT: CPT

## 2019-03-11 PROCEDURE — 71046 X-RAY EXAM CHEST 2 VIEWS: CPT

## 2019-03-11 PROCEDURE — 99285 EMERGENCY DEPT VISIT HI MDM: CPT | Mod: 25

## 2019-03-11 RX ORDER — IPRATROPIUM/ALBUTEROL SULFATE 18-103MCG
3 AEROSOL WITH ADAPTER (GRAM) INHALATION
Qty: 0 | Refills: 0 | Status: COMPLETED | OUTPATIENT
Start: 2019-03-11 | End: 2019-03-11

## 2019-03-11 RX ORDER — ALBUTEROL 90 UG/1
2 AEROSOL, METERED ORAL
Qty: 1 | Refills: 0
Start: 2019-03-11 | End: 2019-03-17

## 2019-03-11 RX ADMIN — Medication 60 MILLIGRAM(S): at 16:03

## 2019-03-11 RX ADMIN — Medication 3 MILLILITER(S): at 16:27

## 2019-03-11 RX ADMIN — Medication 3 MILLILITER(S): at 16:03

## 2019-03-11 RX ADMIN — Medication 3 MILLILITER(S): at 16:58

## 2019-03-11 NOTE — ED ADULT TRIAGE NOTE - CHIEF COMPLAINT QUOTE
non-productive cough, chest tightness, wheezing since last night.  Hx asthma (no hx intubation). Speaking clearly in full sentences

## 2019-03-11 NOTE — ED PROVIDER NOTE - OBJECTIVE STATEMENT
a few days of cough, congestion, felt feverish, feels like asthma acting up.  using albuterol inhaler at home but doesn't feeling like it is helping  has never needed hospital admission for her asthma

## 2019-03-11 NOTE — ED PROVIDER NOTE - NSFOLLOWUPINSTRUCTIONS_ED_ALL_ED_FT
follow up with your regular doctor in 2-3 days    Asthma, Adult  Asthma is a long-term (chronic) condition in which the airways get tight and narrow. The airways are the breathing passages that lead from the nose and mouth down into the lungs. A person with asthma will have times when symptoms get worse. These are called asthma attacks. They can cause coughing, whistling sounds when you breathe (wheezing), shortness of breath, and chest pain. They can make it hard to breathe. There is no cure for asthma, but medicines and lifestyle changes can help control it.    There are many things that can bring on an asthma attack or make asthma symptoms worse (triggers). Common triggers include:    Mold.  Dust.  Cigarette smoke.  Cockroaches.  Things that can cause allergy symptoms (allergens). These include animal skin flakes (dander) and pollen from trees or grass.  Things that pollute the air. These may include household , wood smoke, smog, or chemical odors.  Cold air, weather changes, and wind.  Crying or laughing hard.  Stress.  Certain medicines or drugs.  Certain foods such as dried fruit, potato chips, and grape juice.  Infections, such as a cold or the flu.  Certain medical conditions or diseases.  Exercise or tiring activities.    ImageAsthma may be treated with medicines and by staying away from the things that cause asthma attacks. Types of medicines may include:    Controller medicines. These help prevent asthma symptoms. They are usually taken every day.  Fast-acting reliever or rescue medicines. These quickly relieve asthma symptoms. They are used as needed and provide short-term relief.  Allergy medicines if your attacks are brought on by allergens.  Medicines to help control the body's defense (immune) system.    Follow these instructions at home:  Avoiding triggers in your home     Change your heating and air conditioning filter often.  Limit your use of fireplaces and wood stoves.  Get rid of pests (such as roaches and mice) and their droppings.  Throw away plants if you see mold on them.  Clean your floors. Dust regularly. Use cleaning products that do not smell.  Have someone vacuum when you are not home. Use a vacuum  with a HEPA filter if possible.  Replace carpet with wood, tile, or vinyl sharee. Carpet can trap animal skin flakes and dust.  Use allergy-proof pillows, mattress covers, and box spring covers.  Wash bed sheets and blankets every week in hot water. Dry them in a dryer.  Keep your bedroom free of any triggers.   Avoid pets and keep windows closed when things that cause allergy symptoms are in the air.  Use blankets that are made of polyester or cotton.  Clean bathrooms and araseli with bleach. If possible, have someone repaint the walls in these rooms with mold-resistant paint. Keep out of the rooms that are being cleaned and painted.  Wash your hands often with soap and water. If soap and water are not available, use hand .  Do not allow anyone to smoke in your home.  General instructions     Take over-the-counter and prescription medicines only as told by your doctor.    Talk with your doctor if you have questions about how or when to take your medicines.  Make note if you need to use your medicines more often than usual.    Do not use any products that contain nicotine or tobacco, such as cigarettes and e-cigarettes. If you need help quitting, ask your doctor.  Stay away from secondhand smoke.  Avoid doing things outdoors when allergen counts are high and when air quality is low.  Wear a ski mask when doing outdoor activities in the winter. The mask should cover your nose and mouth. Exercise indoors on cold days if you can.  Warm up before you exercise. Take time to cool down after exercise.  Use a peak flow meter as told by your doctor. A peak flow meter is a tool that measures how well the lungs are working.  Keep track of the peak flow meter's readings. Write them down.  Follow your asthma action plan. This is a written plan for taking care of your asthma and treating your attacks.  Make sure you get all the shots (vaccines) that your doctor recommends. Ask your doctor about a flu shot and a pneumonia shot.  Keep all follow-up visits as told by your doctor. This is important.  Contact a doctor if:  You have wheezing, shortness of breath, or a cough even while taking medicine to prevent attacks.  The mucus you cough up (sputum) is thicker than usual.  The mucus you cough up changes from clear or white to yellow, green, gray, or bloody.  You have problems from the medicine you are taking, such as:    A rash.  Itching.  Swelling.  Trouble breathing.    You need reliever medicines more than 2–3 times a week.  Your peak flow reading is still at 50–79% of your personal best after following the action plan for 1 hour.  You have a fever.  Get help right away if:  You seem to be worse and are not responding to medicine during an asthma attack.  You are short of breath even at rest.  You get short of breath when doing very little activity.  You have trouble eating, drinking, or talking.  You have chest pain or tightness.  You have a fast heartbeat.  Your lips or fingernails start to turn blue.  You are light-headed or dizzy, or you faint.  Your peak flow is less than 50% of your personal best.  You feel too tired to breathe normally.  Summary  Asthma is a long-term (chronic) condition in which the airways get tight and narrow. An asthma attack can make it hard to breathe.  Asthma cannot be cured, but medicines and lifestyle changes can help control it.  Make sure you understand how to avoid triggers and how and when to use your medicines.  This information is not intended to replace advice given to you by your health care provider. Make sure you discuss any questions you have with your health care provider.    Document Released: 06/05/2009 Document Revised: 01/22/2018 Document Reviewed: 01/22/2018  [employer] Interactive Patient Education © 2019 [employer]

## 2019-03-11 NOTE — ED ADULT NURSE NOTE - CHPI ED NUR SYMPTOMS NEG
no pain/no chills/no dizziness/no weakness/no tingling/no nausea/no vomiting/no decreased eating/drinking/no fever

## 2019-03-11 NOTE — ED PROVIDER NOTE - CLINICAL SUMMARY MEDICAL DECISION MAKING FREE TEXT BOX
59 yo female with hx of asthma with cough.  vitals normal, no wheezing on exam.  will give nebs, steroids, cxr 59 yo female with hx of asthma with cough.  vitals normal, no wheezing on exam.  will give nebs, steroids, cxr.  cxr clear.  feels better after nebs, steroids.  will dc with rx inhaler and steroids, fu pmd

## 2019-03-11 NOTE — ED ADULT NURSE NOTE - OBJECTIVE STATEMENT
58 yr old female patient with c/o of cough x3days.  No distress noted.  Breathing appears easy & unlabored, denies chest pain. A+ox3, ambulatory w steady gait, speaking full clear sentences.

## 2019-03-11 NOTE — ED ADULT NURSE NOTE - NSFALLRSKUNASSIST_ED_ALL_ED
Problem: Non-Pressure Injury Wound  Goal: # No deterioration in wound  Outcome: Outcome Not Met, Continue to Monitor  Continue visco, cotton, coban to BLE for the next two weeks.  Return in two weeks.       no

## 2019-03-15 DIAGNOSIS — Z79.2 LONG TERM (CURRENT) USE OF ANTIBIOTICS: ICD-10-CM

## 2019-03-15 DIAGNOSIS — R05 COUGH: ICD-10-CM

## 2019-03-15 DIAGNOSIS — J45.901 UNSPECIFIED ASTHMA WITH (ACUTE) EXACERBATION: ICD-10-CM

## 2019-03-15 DIAGNOSIS — Z79.52 LONG TERM (CURRENT) USE OF SYSTEMIC STEROIDS: ICD-10-CM

## 2019-03-15 DIAGNOSIS — Z79.899 OTHER LONG TERM (CURRENT) DRUG THERAPY: ICD-10-CM

## 2019-03-15 DIAGNOSIS — Z91.041 RADIOGRAPHIC DYE ALLERGY STATUS: ICD-10-CM

## 2019-06-28 ENCOUNTER — EMERGENCY (EMERGENCY)
Facility: HOSPITAL | Age: 59
LOS: 1 days | Discharge: HOME | End: 2019-06-28
Admitting: EMERGENCY MEDICINE
Payer: COMMERCIAL

## 2019-06-28 VITALS
RESPIRATION RATE: 16 BRPM | WEIGHT: 175.05 LBS | DIASTOLIC BLOOD PRESSURE: 75 MMHG | OXYGEN SATURATION: 95 % | HEART RATE: 109 BPM | TEMPERATURE: 98 F | SYSTOLIC BLOOD PRESSURE: 122 MMHG

## 2019-06-28 DIAGNOSIS — Z90.49 ACQUIRED ABSENCE OF OTHER SPECIFIED PARTS OF DIGESTIVE TRACT: Chronic | ICD-10-CM

## 2019-06-28 DIAGNOSIS — Z98.89 OTHER SPECIFIED POSTPROCEDURAL STATES: Chronic | ICD-10-CM

## 2019-06-28 PROCEDURE — 99283 EMERGENCY DEPT VISIT LOW MDM: CPT

## 2019-06-28 PROCEDURE — 99284 EMERGENCY DEPT VISIT MOD MDM: CPT

## 2019-06-28 RX ORDER — DIPHENHYDRAMINE HCL 50 MG
25 CAPSULE ORAL ONCE
Refills: 0 | Status: COMPLETED | OUTPATIENT
Start: 2019-06-28 | End: 2019-06-28

## 2019-06-28 RX ORDER — FAMOTIDINE 10 MG/ML
1 INJECTION INTRAVENOUS
Qty: 5 | Refills: 0
Start: 2019-06-28 | End: 2019-07-02

## 2019-06-28 RX ORDER — FAMOTIDINE 10 MG/ML
20 INJECTION INTRAVENOUS ONCE
Refills: 0 | Status: COMPLETED | OUTPATIENT
Start: 2019-06-28 | End: 2019-06-28

## 2019-06-28 RX ORDER — DIPHENHYDRAMINE HCL 50 MG
1 CAPSULE ORAL
Qty: 5 | Refills: 0
Start: 2019-06-28 | End: 2019-07-02

## 2019-06-28 RX ADMIN — FAMOTIDINE 20 MILLIGRAM(S): 10 INJECTION INTRAVENOUS at 19:26

## 2019-06-28 RX ADMIN — Medication 25 MILLIGRAM(S): at 19:26

## 2019-06-28 RX ADMIN — Medication 40 MILLIGRAM(S): at 19:26

## 2019-06-28 NOTE — ED ADULT NURSE NOTE - NSIMPLEMENTINTERV_GEN_ALL_ED
Implemented All Universal Safety Interventions:  [place] to call system. Call bell, personal items and telephone within reach. Instruct patient to call for assistance. Room bathroom lighting operational. Non-slip footwear when patient is off stretcher. Physically safe environment: no spills, clutter or unnecessary equipment. Stretcher in lowest position, wheels locked, appropriate side rails in place.

## 2019-06-28 NOTE — ED PROVIDER NOTE - NSFOLLOWUPINSTRUCTIONS_ED_ALL_ED_FT
Contact Dermatitis  ImageDermatitis is redness, soreness, and swelling (inflammation) of the skin. Contact dermatitis is a reaction to certain substances that touch the skin. There are two types of contact dermatitis:  Irritant contact dermatitis. This type is caused by something that irritates your skin, such as dry hands from washing them too much. This type does not require previous exposure to the substance for a reaction to occur. This type is more common.  Allergic contact dermatitis. This type is caused by a substance that you are allergic to, such as a nickel allergy or poison ivy. This type only occurs if you have been exposed to the substance (allergen) before. Upon a repeat exposure, your body reacts to the substance. This type is less common.  What are the causes?  Many different substances can cause contact dermatitis. Irritant contact dermatitis is most commonly caused by exposure to:  Makeup.  Soaps.  Detergents.  Bleaches.  Acids.  Metal salts, such as nickel.  Allergic contact dermatitis is most commonly caused by exposure to:  Poisonous plants.  Chemicals.  Jewelry.  Latex.  Medicines.  Preservatives in products, such as clothing.  What increases the risk?  This condition is more likely to develop in:  People who have jobs that expose them to irritants or allergens.  People who have certain medical conditions, such as asthma or eczema.  What are the signs or symptoms?  Symptoms of this condition may occur anywhere on your body where the irritant has touched you or is touched by you. Symptoms include:  Dryness or flaking.  Redness.  Cracks.  Itching.  Pain or a burning feeling.  Blisters.  Drainage of small amounts of blood or clear fluid from skin cracks.  With allergic contact dermatitis, there may also be swelling in areas such as the eyelids, mouth, or genitals.    How is this diagnosed?  This condition is diagnosed with a medical history and physical exam. A patch skin test may be performed to help determine the cause. If the condition is related to your job, you may need to see an occupational medicine specialist.    How is this treated?  Treatment for this condition includes figuring out what caused the reaction and protecting your skin from further contact. Treatment may also include:  Steroid creams or ointments. Oral steroid medicines may be needed in more severe cases.  Antibiotics or antibacterial ointments, if a skin infection is present.  Antihistamine lotion or an antihistamine taken by mouth to ease itching.  A bandage (dressing).  Follow these instructions at home:  Skin Care     Moisturize your skin as needed.  Apply cool compresses to the affected areas.  Try taking a bath with:  Epsom salts. Follow the instructions on the packaging. You can get these at your local pharmacy or grocery store.  Baking soda. Pour a small amount into the bath as directed by your health care provider.  Colloidal oatmeal. Follow the instructions on the packaging. You can get this at your local pharmacy or grocery store.  Try applying baking soda paste to your skin. Stir water into baking soda until it reaches a paste-like consistency.  Do not scratch your skin.  Bathe less frequently, such as every other day.  Bathe in lukewarm water. Avoid using hot water.  Medicines     Take or apply over-the-counter and prescription medicines only as told by your health care provider.  If you were prescribed an antibiotic medicine, take or apply your antibiotic as told by your health care provider. Do not stop using the antibiotic even if your condition starts to improve.  General instructions     Keep all follow-up visits as told by your health care provider. This is important.  Avoid the substance that caused your reaction. If you do not know what caused it, keep a journal to try to track what caused it. Write down:  What you eat.  What cosmetic products you use.  What you drink.  What you wear in the affected area. This includes jewelry.  If you were given a dressing, take care of it as told by your health care provider. This includes when to change and remove it.  Contact a health care provider if:  Your condition does not improve with treatment.  Your condition gets worse.  You have signs of infection such as swelling, tenderness, redness, soreness, or warmth in the affected area.  You have a fever.  You have new symptoms.  Get help right away if:  You have a severe headache, neck pain, or neck stiffness.  You vomit.  You feel very sleepy.  You notice red streaks coming from the affected area.  Your bone or joint underneath the affected area becomes painful after the skin has healed.  The affected area turns darker.  You have difficulty breathing.  This information is not intended to replace advice given to you by your health care provider. Make sure you discuss any questions you have with your health care provider.

## 2019-06-28 NOTE — ED PROVIDER NOTE - CLINICAL SUMMARY MEDICAL DECISION MAKING FREE TEXT BOX
58 y/o female with b/l upper rash. No signs of anaphylaxis. Airway patent. Do not suspect insect bite, zoster, or syphilis. Likely contact dermatitis. Advised follow up with allergist and dermatology is not resolved.

## 2019-06-28 NOTE — ED PROVIDER NOTE - OBJECTIVE STATEMENT
35 y/o female with a PMHx of asthma is present in the ED c/o b/l rash located on the upper arms. Pt reports the rash is only located on the top of her arms associated with itching. She has been topical benadryl without improvement of her symptoms. She denies the following: fever, chills, pain, sore throat, chest pain, exposure to animals, new detergent, soap, lotion. She denies the following: lip swelling, tongue swelling, difficulty breathing.

## 2019-07-02 DIAGNOSIS — J45.909 UNSPECIFIED ASTHMA, UNCOMPLICATED: ICD-10-CM

## 2019-07-02 DIAGNOSIS — L25.9 UNSPECIFIED CONTACT DERMATITIS, UNSPECIFIED CAUSE: ICD-10-CM

## 2019-07-02 DIAGNOSIS — Z79.899 OTHER LONG TERM (CURRENT) DRUG THERAPY: ICD-10-CM

## 2019-07-02 DIAGNOSIS — Z79.52 LONG TERM (CURRENT) USE OF SYSTEMIC STEROIDS: ICD-10-CM

## 2019-07-02 DIAGNOSIS — Z91.041 RADIOGRAPHIC DYE ALLERGY STATUS: ICD-10-CM

## 2019-07-02 DIAGNOSIS — R21 RASH AND OTHER NONSPECIFIC SKIN ERUPTION: ICD-10-CM

## 2019-09-09 ENCOUNTER — EMERGENCY (EMERGENCY)
Facility: HOSPITAL | Age: 59
LOS: 1 days | Discharge: HOME | End: 2019-09-09
Attending: EMERGENCY MEDICINE | Admitting: EMERGENCY MEDICINE
Payer: COMMERCIAL

## 2019-09-09 VITALS
WEIGHT: 173.5 LBS | DIASTOLIC BLOOD PRESSURE: 77 MMHG | HEART RATE: 98 BPM | OXYGEN SATURATION: 94 % | SYSTOLIC BLOOD PRESSURE: 117 MMHG | RESPIRATION RATE: 20 BRPM | TEMPERATURE: 98 F

## 2019-09-09 VITALS
DIASTOLIC BLOOD PRESSURE: 74 MMHG | TEMPERATURE: 98 F | RESPIRATION RATE: 18 BRPM | OXYGEN SATURATION: 96 % | SYSTOLIC BLOOD PRESSURE: 115 MMHG | HEART RATE: 91 BPM

## 2019-09-09 DIAGNOSIS — Z79.899 OTHER LONG TERM (CURRENT) DRUG THERAPY: ICD-10-CM

## 2019-09-09 DIAGNOSIS — Z91.041 RADIOGRAPHIC DYE ALLERGY STATUS: ICD-10-CM

## 2019-09-09 DIAGNOSIS — Z98.89 OTHER SPECIFIED POSTPROCEDURAL STATES: Chronic | ICD-10-CM

## 2019-09-09 DIAGNOSIS — J45.901 UNSPECIFIED ASTHMA WITH (ACUTE) EXACERBATION: ICD-10-CM

## 2019-09-09 DIAGNOSIS — Z90.49 ACQUIRED ABSENCE OF OTHER SPECIFIED PARTS OF DIGESTIVE TRACT: Chronic | ICD-10-CM

## 2019-09-09 DIAGNOSIS — B34.9 VIRAL INFECTION, UNSPECIFIED: ICD-10-CM

## 2019-09-09 DIAGNOSIS — Z79.52 LONG TERM (CURRENT) USE OF SYSTEMIC STEROIDS: ICD-10-CM

## 2019-09-09 LAB
ALBUMIN SERPL ELPH-MCNC: 4 G/DL — SIGNIFICANT CHANGE UP (ref 3.3–5)
ALP SERPL-CCNC: 99 U/L — SIGNIFICANT CHANGE UP (ref 40–120)
ALT FLD-CCNC: 19 U/L — SIGNIFICANT CHANGE UP (ref 10–45)
ANION GAP SERPL CALC-SCNC: 9 MMOL/L — SIGNIFICANT CHANGE UP (ref 5–17)
AST SERPL-CCNC: 14 U/L — SIGNIFICANT CHANGE UP (ref 10–40)
BASOPHILS # BLD AUTO: 0.06 K/UL — SIGNIFICANT CHANGE UP (ref 0–0.2)
BASOPHILS NFR BLD AUTO: 0.6 % — SIGNIFICANT CHANGE UP (ref 0–2)
BILIRUB SERPL-MCNC: 0.5 MG/DL — SIGNIFICANT CHANGE UP (ref 0.2–1.2)
BUN SERPL-MCNC: 14 MG/DL — SIGNIFICANT CHANGE UP (ref 7–23)
CALCIUM SERPL-MCNC: 9.2 MG/DL — SIGNIFICANT CHANGE UP (ref 8.4–10.5)
CHLORIDE SERPL-SCNC: 104 MMOL/L — SIGNIFICANT CHANGE UP (ref 96–108)
CO2 SERPL-SCNC: 27 MMOL/L — SIGNIFICANT CHANGE UP (ref 22–31)
CREAT SERPL-MCNC: 0.63 MG/DL — SIGNIFICANT CHANGE UP (ref 0.5–1.3)
EOSINOPHIL # BLD AUTO: 0.14 K/UL — SIGNIFICANT CHANGE UP (ref 0–0.5)
EOSINOPHIL NFR BLD AUTO: 1.4 % — SIGNIFICANT CHANGE UP (ref 0–6)
FLU A RESULT: SIGNIFICANT CHANGE UP
FLU A RESULT: SIGNIFICANT CHANGE UP
FLUAV AG NPH QL: SIGNIFICANT CHANGE UP
FLUBV AG NPH QL: SIGNIFICANT CHANGE UP
GLUCOSE SERPL-MCNC: 99 MG/DL — SIGNIFICANT CHANGE UP (ref 70–99)
HCT VFR BLD CALC: 38.3 % — SIGNIFICANT CHANGE UP (ref 34.5–45)
HGB BLD-MCNC: 12.7 G/DL — SIGNIFICANT CHANGE UP (ref 11.5–15.5)
IMM GRANULOCYTES NFR BLD AUTO: 0.3 % — SIGNIFICANT CHANGE UP (ref 0–1.5)
LYMPHOCYTES # BLD AUTO: 1.89 K/UL — SIGNIFICANT CHANGE UP (ref 1–3.3)
LYMPHOCYTES # BLD AUTO: 18.4 % — SIGNIFICANT CHANGE UP (ref 13–44)
MCHC RBC-ENTMCNC: 29.3 PG — SIGNIFICANT CHANGE UP (ref 27–34)
MCHC RBC-ENTMCNC: 33.2 GM/DL — SIGNIFICANT CHANGE UP (ref 32–36)
MCV RBC AUTO: 88.5 FL — SIGNIFICANT CHANGE UP (ref 80–100)
MONOCYTES # BLD AUTO: 0.72 K/UL — SIGNIFICANT CHANGE UP (ref 0–0.9)
MONOCYTES NFR BLD AUTO: 7 % — SIGNIFICANT CHANGE UP (ref 2–14)
NEUTROPHILS # BLD AUTO: 7.41 K/UL — HIGH (ref 1.8–7.4)
NEUTROPHILS NFR BLD AUTO: 72.3 % — SIGNIFICANT CHANGE UP (ref 43–77)
NRBC # BLD: 0 /100 WBCS — SIGNIFICANT CHANGE UP (ref 0–0)
PLATELET # BLD AUTO: 270 K/UL — SIGNIFICANT CHANGE UP (ref 150–400)
POTASSIUM SERPL-MCNC: 4.5 MMOL/L — SIGNIFICANT CHANGE UP (ref 3.5–5.3)
POTASSIUM SERPL-SCNC: 4.5 MMOL/L — SIGNIFICANT CHANGE UP (ref 3.5–5.3)
PROT SERPL-MCNC: 7.9 G/DL — SIGNIFICANT CHANGE UP (ref 6–8.3)
RBC # BLD: 4.33 M/UL — SIGNIFICANT CHANGE UP (ref 3.8–5.2)
RBC # FLD: 13 % — SIGNIFICANT CHANGE UP (ref 10.3–14.5)
RSV RESULT: SIGNIFICANT CHANGE UP
RSV RNA RESP QL NAA+PROBE: SIGNIFICANT CHANGE UP
SODIUM SERPL-SCNC: 140 MMOL/L — SIGNIFICANT CHANGE UP (ref 135–145)
WBC # BLD: 10.25 K/UL — SIGNIFICANT CHANGE UP (ref 3.8–10.5)
WBC # FLD AUTO: 10.25 K/UL — SIGNIFICANT CHANGE UP (ref 3.8–10.5)

## 2019-09-09 PROCEDURE — 99285 EMERGENCY DEPT VISIT HI MDM: CPT | Mod: 25

## 2019-09-09 PROCEDURE — 87631 RESP VIRUS 3-5 TARGETS: CPT

## 2019-09-09 PROCEDURE — 71046 X-RAY EXAM CHEST 2 VIEWS: CPT

## 2019-09-09 PROCEDURE — 71046 X-RAY EXAM CHEST 2 VIEWS: CPT | Mod: 26

## 2019-09-09 PROCEDURE — 96374 THER/PROPH/DIAG INJ IV PUSH: CPT

## 2019-09-09 PROCEDURE — 36415 COLL VENOUS BLD VENIPUNCTURE: CPT

## 2019-09-09 PROCEDURE — 85025 COMPLETE CBC W/AUTO DIFF WBC: CPT

## 2019-09-09 PROCEDURE — 94640 AIRWAY INHALATION TREATMENT: CPT

## 2019-09-09 PROCEDURE — 80053 COMPREHEN METABOLIC PANEL: CPT

## 2019-09-09 RX ORDER — EPINEPHRINE 0.3 MG/.3ML
3 INJECTION INTRAMUSCULAR; SUBCUTANEOUS
Qty: 2 | Refills: 0
Start: 2019-09-09 | End: 2019-10-08

## 2019-09-09 RX ORDER — IPRATROPIUM/ALBUTEROL SULFATE 18-103MCG
3 AEROSOL WITH ADAPTER (GRAM) INHALATION
Refills: 0 | Status: COMPLETED | OUTPATIENT
Start: 2019-09-09 | End: 2019-09-09

## 2019-09-09 RX ORDER — DEXAMETHASONE 0.5 MG/5ML
10 ELIXIR ORAL ONCE
Refills: 0 | Status: COMPLETED | OUTPATIENT
Start: 2019-09-09 | End: 2019-09-09

## 2019-09-09 RX ADMIN — Medication 3 MILLILITER(S): at 16:00

## 2019-09-09 RX ADMIN — Medication 3 MILLILITER(S): at 16:23

## 2019-09-09 RX ADMIN — Medication 10 MILLIGRAM(S): at 15:02

## 2019-09-09 RX ADMIN — Medication 3 MILLILITER(S): at 15:05

## 2019-09-09 NOTE — ED PROVIDER NOTE - NSFOLLOWUPINSTRUCTIONS_ED_ALL_ED_FT
Asthma    Asthma is a condition in which the airways tighten and narrow, making it difficult to breath. Asthma episodes, also called asthma attacks, range from minor to life-threatening. Symptoms include wheezing, coughing, chest tightness, or shortness of breath. The diagnosis of asthma is made by a review of your medical history and a physical exam, but may involve additional testing. Asthma cannot be cured, but medicines and lifestyle changes can help control it. Avoid triggers of asthma which may include animal dander, pollen, mold, smoke, air pollutants, etc.     SEEK IMMEDIATE MEDICAL CARE IF YOU HAVE ANY OF THE FOLLOWING SYMPTOMS: worsening of symptoms, shortness of breath at rest, chest pain, bluish discoloration to lips or fingertips, lightheadedness/dizziness, or fever.     Viral Respiratory Infection    A viral respiratory infection is an illness that affects parts of the body used for breathing, like the lungs, nose, and throat. It is caused by a germ called a virus. Symptoms can include runny nose, coughing, sneezing, fatigue, body aches, sore throat, fever, or headache. Over the counter medicine can be used to manage the symptoms but the infection typically goes away on its own in 5 to 10 days.     SEEK IMMEDIATE MEDICAL CARE IF YOU HAVE ANY OF THE FOLLOWING SYMPTOMS: shortness of breath, chest pain, fever over 10 days, or lightheadedness/dizziness.

## 2019-09-09 NOTE — ED PROVIDER NOTE - ENMT, MLM
Airway patent, Nasal mucosa clear. Mouth with normal mucosa. Throat has no vesicles, no oropharyngeal exudates and uvula is midline. ears: no AOM or AOE b/l. no cervical lymphadenopathy b/l

## 2019-09-09 NOTE — ED PROVIDER NOTE - OBJECTIVE STATEMENT
The pt is a 60 y/o F, who presents to ED c/o chest tightness, wheezing today. Pt states that had myalgias and malaise yest, had a fever yest - t max 102, did not take any meds for symptoms. States "I feel like I have the flu". Denies cp, palpitations, n/v/d, abd pain, sore throat, rash, sick contacts The pt is a 60 y/o F, who presents to ED c/o chest tightness, wheezing today. Pt states that had myalgias and malaise yest, had a fever yest - t max 102, did not take any meds for symptoms. States "I feel like I have the flu". Last asthma attack few mon ago, never intubated, never hosp. Denies cp, palpitations, n/v/d, abd pain, sore throat, rash, sick contacts

## 2019-09-09 NOTE — ED ADULT NURSE NOTE - NSIMPLEMENTINTERV_GEN_ALL_ED
Implemented All Universal Safety Interventions:  [place] to call system. Call bell, personal items and telephone within reach. Instruct patient to call for assistance. Room bathroom lighting operational. Non-slip footwear when patient is off stretcher. Physically safe environment: no spills, clutter or unnecessary equipment. Stretcher in lowest position, wheels locked, appropriate side rails in place.

## 2019-09-09 NOTE — ED PROVIDER NOTE - CLINICAL SUMMARY MEDICAL DECISION MAKING FREE TEXT BOX
pt states asthma exacerbation due to cough - states that had a fever yest (102) but did not take any meds for it, was feeling fatigued and having myalgias, today w/dry cough and feels like is having chest tightness, well appearing, no resp distress, no hypoxia, speaking in long full sentences, cxr clear, labs wnl, neg flu, given nebs and steroids w/good relief, will dc w/cough meds, supportive care and f/u w/pmd, pt understands and agrees w/plan

## 2019-09-09 NOTE — ED ADULT NURSE NOTE - OBJECTIVE STATEMENT
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

## 2019-09-09 NOTE — ED PROVIDER NOTE - ATTENDING CONTRIBUTION TO CARE
I discussed the plan of care of the patient directly with the PA and examined the patient while in the Emergency Department. I agree with the HPI and PE as documented by the PA.  Pt is a 58yo f, h/o asthma, who p/w c/o chest tightness and wheezing today. + generalized myalgias, malaise and fever yesterday. No n/v/d, uri sx's, urinary sx's. Lungs cta b/l. + s1, s2, rrr. No leg edema. calf tenderness/ cords. 2+ pulses b/l. Flu swab neg. wbc wnl. CXR neg for i/e/chf. Impression is viral illness. ED evaluation and management discussed with the patient in detail.  Close PMD follow up encouraged.  Strict ED return instructions discussed in detail and patient given the opportunity to ask any questions about their discharge diagnosis and instructions. Patient verbalized understanding. I discussed the plan of care of the patient directly with the PA and examined the patient while in the Emergency Department. I agree with the HPI and PE as documented by the PA.  Pt is a 60yo f, h/o asthma, who p/w c/o chest tightness and wheezing today. + generalized myalgias, malaise and fever yesterday. No n/v/d, urinary sx's. Lungs cta b/l. + s1, s2, rrr. No leg edema. calf tenderness/ cords. 2+ pulses b/l. Flu swab neg. wbc wnl. CXR neg for i/e/chf. Pt feeling better after duonebs and steroids. Impression is acute viral illness. ED evaluation and management discussed with the patient in detail.  Close PMD follow up encouraged.  Strict ED return instructions discussed in detail and patient given the opportunity to ask any questions about their discharge diagnosis and instructions. Patient verbalized understanding.

## 2019-11-30 ENCOUNTER — EMERGENCY (EMERGENCY)
Facility: HOSPITAL | Age: 59
LOS: 1 days | Discharge: HOME | End: 2019-11-30
Attending: EMERGENCY MEDICINE | Admitting: EMERGENCY MEDICINE
Payer: COMMERCIAL

## 2019-11-30 VITALS
SYSTOLIC BLOOD PRESSURE: 133 MMHG | RESPIRATION RATE: 19 BRPM | HEART RATE: 108 BPM | DIASTOLIC BLOOD PRESSURE: 91 MMHG | OXYGEN SATURATION: 94 % | TEMPERATURE: 98 F

## 2019-11-30 VITALS
WEIGHT: 169.98 LBS | OXYGEN SATURATION: 98 % | TEMPERATURE: 98 F | HEART RATE: 124 BPM | RESPIRATION RATE: 20 BRPM | SYSTOLIC BLOOD PRESSURE: 125 MMHG | DIASTOLIC BLOOD PRESSURE: 82 MMHG | HEIGHT: 65 IN

## 2019-11-30 DIAGNOSIS — Z90.49 ACQUIRED ABSENCE OF OTHER SPECIFIED PARTS OF DIGESTIVE TRACT: Chronic | ICD-10-CM

## 2019-11-30 DIAGNOSIS — Z98.89 OTHER SPECIFIED POSTPROCEDURAL STATES: Chronic | ICD-10-CM

## 2019-11-30 PROCEDURE — 96374 THER/PROPH/DIAG INJ IV PUSH: CPT

## 2019-11-30 PROCEDURE — 96375 TX/PRO/DX INJ NEW DRUG ADDON: CPT

## 2019-11-30 PROCEDURE — 93010 ELECTROCARDIOGRAM REPORT: CPT

## 2019-11-30 PROCEDURE — 71046 X-RAY EXAM CHEST 2 VIEWS: CPT

## 2019-11-30 PROCEDURE — 94640 AIRWAY INHALATION TREATMENT: CPT

## 2019-11-30 PROCEDURE — 99285 EMERGENCY DEPT VISIT HI MDM: CPT | Mod: 25

## 2019-11-30 PROCEDURE — 99285 EMERGENCY DEPT VISIT HI MDM: CPT

## 2019-11-30 PROCEDURE — 71046 X-RAY EXAM CHEST 2 VIEWS: CPT | Mod: 26

## 2019-11-30 PROCEDURE — 93005 ELECTROCARDIOGRAM TRACING: CPT

## 2019-11-30 RX ORDER — ALBUTEROL 90 UG/1
2 AEROSOL, METERED ORAL
Qty: 1 | Refills: 0
Start: 2019-11-30 | End: 2019-12-29

## 2019-11-30 RX ORDER — IPRATROPIUM/ALBUTEROL SULFATE 18-103MCG
3 AEROSOL WITH ADAPTER (GRAM) INHALATION
Refills: 0 | Status: COMPLETED | OUTPATIENT
Start: 2019-11-30 | End: 2019-11-30

## 2019-11-30 RX ORDER — ALBUTEROL 90 UG/1
3 AEROSOL, METERED ORAL
Qty: 90 | Refills: 0
Start: 2019-11-30

## 2019-11-30 RX ORDER — MAGNESIUM SULFATE 500 MG/ML
2 VIAL (ML) INJECTION ONCE
Refills: 0 | Status: COMPLETED | OUTPATIENT
Start: 2019-11-30 | End: 2019-11-30

## 2019-11-30 RX ADMIN — Medication 50 GRAM(S): at 21:42

## 2019-11-30 RX ADMIN — Medication 3 MILLILITER(S): at 21:42

## 2019-11-30 RX ADMIN — Medication 3 MILLILITER(S): at 21:00

## 2019-11-30 RX ADMIN — Medication 125 MILLIGRAM(S): at 21:41

## 2019-11-30 RX ADMIN — Medication 3 MILLILITER(S): at 21:22

## 2019-11-30 NOTE — ED PROVIDER NOTE - PROGRESS NOTE DETAILS
Peak flow 350 Peak flow improved to 350 from 160. Pt feeling much better. Good aeration, minimal wheezing. Will d/c w/ continued tx, f/u PCP. Return precautions given

## 2019-11-30 NOTE — ED PROVIDER NOTE - PHYSICAL EXAMINATION
Constitutional: Well appearing, well nourished, awake, alert, oriented to person, place, time/situation and in no apparent distress.  ENMT: Airway patent. Normal MM  Eyes: Clear bilaterally  Cardiac: Normal rate, regular rhythm.  Heart sounds S1, S2.  No murmurs, rubs or gallops. No JVD or LE edema  Respiratory: Coarse BS throughout w/ diffuse exp wheezing, diminished throughout. No R/R. No increased WOB, tachypnea, hypoxia, or accessory mm use. Pt speaks in full sentences.   Gastrointestinal: Abd soft, NT, ND, NABS. No guarding, rebound, or rigidity. No pulsatile abdominal masses. No organomegaly appreciated. No CVAT   Musculoskeletal: Range of motion is not limited. No calf ttp  Neuro: Alert and oriented x 3, face symmetric and speech fluent. Strength 5/5 x 4 ext and symmetric, nml gross motor movement, nml gait. No focal deficits noted.  Skin: Skin normal color for race, warm, dry and intact. No evidence of rash.  Psych: Alert and oriented to person, place, time/situation. normal mood and affect. no apparent risk to self or others.

## 2019-11-30 NOTE — ED PROVIDER NOTE - CLINICAL SUMMARY MEDICAL DECISION MAKING FREE TEXT BOX
URI w/ asthma exacerbation. DDx includes but not limited to viral URI / CHETNA / Flu, less likely PNA w/ concomitant asthma exacerbation, less likely other pathology. Tx w/ nebs, steroids, Mag, monitor peak flow. Check CXR / EKG. Dispo pending w/u and clinical status

## 2019-11-30 NOTE — ED PROVIDER NOTE - PATIENT PORTAL LINK FT
You can access the FollowMyHealth Patient Portal offered by [facility] by registering at the following website: [email or web address]. By joining [employer]’s FollowMyHealth portal, you will also be able to view your health information using other applications (apps) compatible with our system.

## 2019-11-30 NOTE — ED PROVIDER NOTE - OBJECTIVE STATEMENT
Pt w/ PMHx asthma, fibroids, PSHx cholecystectomy p/w dry cough, asthma exacerbation x 2 days. No hx hospitalizations or intubations. No known fever. + chills. No rhinorrhea, congestion, n/v/d, dysuria, or rash. No CP. Pt reports the cough is worsening and her pump is not working. Non smoker.

## 2019-11-30 NOTE — ED PROVIDER NOTE - NS ED ROS FT
Constitutional: See HPI  Eyes: No pain, blurry vision, or discharge.  ENMT: No hearing changes, pain, discharge or infections. No neck pain or stiffness.  Cardiac: No chest pain, SOB or edema. No chest pain with exertion.  Respiratory: See HPI  GI: No nausea, vomiting, diarrhea or abdominal pain.  : No dysuria, frequency or burning.  MS: No myalgia, muscle weakness, joint pain or back pain.  Neuro: No headache or weakness. No LOC.  Skin: No skin rash.   Endocrine: No history of thyroid disease or diabetes.  Except as documented in the HPI, all other systems are negative.

## 2019-12-08 DIAGNOSIS — Z79.899 OTHER LONG TERM (CURRENT) DRUG THERAPY: ICD-10-CM

## 2019-12-08 DIAGNOSIS — J45.901 UNSPECIFIED ASTHMA WITH (ACUTE) EXACERBATION: ICD-10-CM

## 2020-03-11 ENCOUNTER — EMERGENCY (EMERGENCY)
Facility: HOSPITAL | Age: 60
LOS: 1 days | Discharge: HOME | End: 2020-03-11
Attending: EMERGENCY MEDICINE | Admitting: EMERGENCY MEDICINE
Payer: COMMERCIAL

## 2020-03-11 VITALS
SYSTOLIC BLOOD PRESSURE: 136 MMHG | RESPIRATION RATE: 18 BRPM | DIASTOLIC BLOOD PRESSURE: 90 MMHG | HEART RATE: 111 BPM | HEIGHT: 64 IN | OXYGEN SATURATION: 96 % | WEIGHT: 175.05 LBS | TEMPERATURE: 98 F

## 2020-03-11 DIAGNOSIS — J45.901 UNSPECIFIED ASTHMA WITH (ACUTE) EXACERBATION: ICD-10-CM

## 2020-03-11 DIAGNOSIS — Z79.899 OTHER LONG TERM (CURRENT) DRUG THERAPY: ICD-10-CM

## 2020-03-11 DIAGNOSIS — Z98.89 OTHER SPECIFIED POSTPROCEDURAL STATES: Chronic | ICD-10-CM

## 2020-03-11 DIAGNOSIS — Z90.49 ACQUIRED ABSENCE OF OTHER SPECIFIED PARTS OF DIGESTIVE TRACT: Chronic | ICD-10-CM

## 2020-03-11 DIAGNOSIS — B34.9 VIRAL INFECTION, UNSPECIFIED: ICD-10-CM

## 2020-03-11 LAB — RAPID RVP RESULT: SIGNIFICANT CHANGE UP

## 2020-03-11 PROCEDURE — 87633 RESP VIRUS 12-25 TARGETS: CPT

## 2020-03-11 PROCEDURE — 87486 CHLMYD PNEUM DNA AMP PROBE: CPT

## 2020-03-11 PROCEDURE — 87581 M.PNEUMON DNA AMP PROBE: CPT

## 2020-03-11 PROCEDURE — 99284 EMERGENCY DEPT VISIT MOD MDM: CPT | Mod: 25

## 2020-03-11 PROCEDURE — 99284 EMERGENCY DEPT VISIT MOD MDM: CPT

## 2020-03-11 PROCEDURE — 87798 DETECT AGENT NOS DNA AMP: CPT

## 2020-03-11 PROCEDURE — 94640 AIRWAY INHALATION TREATMENT: CPT

## 2020-03-11 RX ORDER — IPRATROPIUM/ALBUTEROL SULFATE 18-103MCG
3 AEROSOL WITH ADAPTER (GRAM) INHALATION ONCE
Refills: 0 | Status: COMPLETED | OUTPATIENT
Start: 2020-03-11 | End: 2020-03-11

## 2020-03-11 RX ORDER — ALBUTEROL 90 UG/1
1 AEROSOL, METERED ORAL
Qty: 1 | Refills: 0
Start: 2020-03-11 | End: 2020-03-20

## 2020-03-11 RX ORDER — IPRATROPIUM/ALBUTEROL SULFATE 18-103MCG
3 AEROSOL WITH ADAPTER (GRAM) INHALATION
Qty: 120 | Refills: 0
Start: 2020-03-11 | End: 2020-03-20

## 2020-03-11 RX ORDER — IBUPROFEN 200 MG
600 TABLET ORAL ONCE
Refills: 0 | Status: COMPLETED | OUTPATIENT
Start: 2020-03-11 | End: 2020-03-11

## 2020-03-11 RX ADMIN — Medication 50 MILLIGRAM(S): at 19:40

## 2020-03-11 RX ADMIN — Medication 3 MILLILITER(S): at 19:40

## 2020-03-11 RX ADMIN — Medication 600 MILLIGRAM(S): at 19:40

## 2020-03-11 NOTE — ED PROVIDER NOTE - NS_EDPROVIDERDISPOUSERTYPE_ED_A_ED
Scribe Attestation (For Scribes USE Only)... I have personally evaluated and examined the patient. The Attending was available to me as a supervising provider if needed./Scribe Attestation (For Scribes USE Only)... Scribe Attestation (For Scribes USE Only).../Attending Attestation (For Attendings USE Only).../I have personally evaluated and examined the patient. The Attending was available to me as a supervising provider if needed.

## 2020-03-11 NOTE — ED ADULT NURSE NOTE - OBJECTIVE STATEMENT
Patient w/ Hx of asthma, states yesterday started dry cough, body aches, mild SOB, w/ wheezing and subjective fevers.

## 2020-03-11 NOTE — ED PROVIDER NOTE - CLINICAL SUMMARY MEDICAL DECISION MAKING FREE TEXT BOX
58 y/o F with PMHx asthma, uses ventolin and inhaler, presents to the ED with tactile fever, cough, and body aches since yesterday. No sick contact, no COVID-19 exposure, no travel to high risk areas. On exam pt appears well, nontoxic, wheezing heard throughout all lung fields, heart regular rate and rhythm, noted nasal congestion. Plan for labs, meds, and nebulizer treatment. 60 y/o F with PMHx asthma, uses ventolin and inhaler, presents to the ED with tactile fever, cough, and body aches since yesterday. No sick contact, no COVID-19 exposure, no travel to high risk areas. On exam pt appears well, nontoxic, wheezing heard throughout all lung fields, heart regular rate and rhythm, noted nasal congestion. Plan for labs, meds, and nebulizer treatment. Patient with URI symptoms. RVP negative. Patient is otherwise non-toxic and well-appearing in no respiratory distress. No known hx of exposure to positive coronavirus cases or international travel to ([place], [place], [place], [place], [place]). According to strict CDC and [facility] EMSL guidelines, patient does not fit criteria for testing of COVID-19. Patient has been given education on Novel Coronovirus and testing education. ED evaluation and management discussed with the patient and family (if available) in detail.  Close PMD follow up encouraged.  Strict ED return instructions discussed in detail and patient given the opportunity to ask any questions about their discharge diagnosis and instructions. Patient verbalized understanding. Patient is agreeable to plan.

## 2020-03-11 NOTE — ED PROVIDER NOTE - NSFOLLOWUPINSTRUCTIONS_ED_ALL_ED_FT
Viral Respiratory Infection    A viral respiratory infection is an illness that affects parts of the body used for breathing, like the lungs, nose, and throat. It is caused by a germ called a virus. Symptoms can include runny nose, coughing, sneezing, fatigue, body aches, sore throat, fever, or headache. Over the counter medicine can be used to manage the symptoms but the infection typically goes away on its own in 5 to 10 days.     SEEK IMMEDIATE MEDICAL CARE IF YOU HAVE ANY OF THE FOLLOWING SYMPTOMS: shortness of breath, chest pain, fever over 10 days, or lightheadedness/dizziness.     Your Respiratory Viral Panel was negative. According to strict CDC and [facility] EMSL guidelines which consists of: no known history of exposure to positive coronavirus cases or international travel to ([place], [place], [place], [place], [place]), therfore, you do not fit criteria for testing of COVID-19. You has been given education on Novel Coronovirus and testing education. Please self-quarantine for 14 days and follow strict guidelines. Please return to the ED for any concerning or worsening of your symtpoms.

## 2020-03-11 NOTE — ED ADULT TRIAGE NOTE - CHIEF COMPLAINT QUOTE
pt c/o asthma exacerbation, feeling tight and pump is not working. denies fevers. c/o bodyaches. no recent travel or sick contacts.

## 2020-03-11 NOTE — ED PROVIDER NOTE - ATTENDING CONTRIBUTION TO CARE
asthma exacerbation, suspect viral trigger. no covid19 high risk exposure.  given neb/ prednisone with improvement.  home with similar.  return precautions discussed.  rvp pending at time of dc asthma exacerbation, suspect viral trigger. no covid19 high risk exposure.  given neb/ prednisone with improvement.  home with similar.  return precautions discussed.  rvp pending at time of dc.

## 2020-03-11 NOTE — ED PROVIDER NOTE - PHYSICAL EXAMINATION
General: Patient is well developed and well nourised. Patient is alert and oriented to person, place and date. Patient is laying comfortably in stretcher and appears in no acute distress.  HEENT: Head is normocephalic and atraumatic. Pupils are equal, round and reactive to light and accommodation. Extraocular movements intact. No evidence of nystagmus, conjunctival injection, or scleral icterus. External ears symmetric and non-tender without evidence of discharge. Ear canals are clear without evidence of edema or erythema. Cone of light evident on tympanic membrade without evidence of erythema, retraction or bulge. No hemotympanum present bilaterally.  Nose is symmetric, non-tender, patent without evidence of discharge. Teeth in good repair. Uvula midline. Pharynx without erythema, edema, tonsillar enlargement or exudates.   Neck: Supple and nontender, with no evidence of lymphadenopathy. No cervical spinal tenderness. Full range of motion.  Heart: Regular rate and rhythm. No murmurs, rubs or gallops.   Lungs: wheezing heard throughout lungs. . No note of wheezes, rhonchi, rales. Equal chest expansion. No note of retractions.  Neuro: Cranial nerves intact. GCS 15. Moving all extremities without discomfort. Sensation intact. Gait steady  Skin: Warm, dry and intact without evidence of rashes, bruising, pallor, jaundice or cyanosis.   Psych: Mood and affect appropriate.

## 2020-03-11 NOTE — ED ADULT NURSE NOTE - NSFALLRSKHARMRISK_ED_ALL_ED
-- DO NOT REPLY / DO NOT REPLY ALL --  -- Message is from the [facility]--    Patient is requesting a medication refill - medication is on active medication list    Patient is currently OUT of the requested medication.    Was Medication Pended?  Yes.    Rx Name and Dose:  HYDROcodone-acetaminophen (NORCO)  MG per tablet    Duration: 30 days    Pharmacy  [id number] - [place], [place] - [address] [place] [address]    Patient confirmed the above pharmacy as correct?  Yes    Caller Information       Type Contact Phone    02/27/2020 08:22 AM Phone (Incoming) Emely Gamboa (Self) [phone] (H)          Alternative phone number: none    Turnaround time given to caller:   \"This message will be sent to [state Provider's name]. The clinical team will fulfill your request as soon as they review your message.\"  
no

## 2020-03-11 NOTE — ED PROVIDER NOTE - PATIENT PORTAL LINK FT
You can access the FollowMyHealth Patient Portal offered by [facility] by registering at the following website: [email or web address]. By joining [employer]’s FollowMyHealth portal, you will also be able to view your health information using other applications (apps) compatible with our system.

## 2020-03-11 NOTE — ED PROVIDER NOTE - OBJECTIVE STATEMENT
60 y/o F with PMHx asthma (no past hospitalizations) presents to the ED with cough, headache, back pain, body aches, tactile fever, and chills associated with palpitations since yesterday. Pt took her albuterol without relief of symptoms. Pt notes  at home with cough and sore throat. Denies chest pain. No known contact with COVID-19 cases, no high risk travel.

## 2020-03-12 PROBLEM — D21.9 BENIGN NEOPLASM OF CONNECTIVE AND OTHER SOFT TISSUE, UNSPECIFIED: Chronic | Status: ACTIVE | Noted: 2019-11-30

## 2020-03-12 NOTE — ED POST DISCHARGE NOTE - DETAILS
pt aware- recommend self quarantine 14 days. states feel better and believes symptoms due to asthma. will consider self quarantine...

## 2020-03-12 NOTE — ED POST DISCHARGE NOTE - RESULT SUMMARY
negative rvp with flu like symptoms. unable to reach. message left. would recommend self quarentine for 14 days given cannot r/o COVID 19 negative rvp with flu like symptoms. unable to reach. message left. would recommend self quarantine for 14 days given cannot r/o COVID 19

## 2020-07-02 NOTE — ED ADULT NURSE NOTE - NS ED NURSE LEVEL OF CONSCIOUSNESS MENTAL STATUS
Awake/Alert Xolair Counseling:  Patient informed of potential adverse effects including but not limited to fever, muscle aches, rash and allergic reactions.  The patient verbalized understanding of the proper use and possible adverse effects of Xolair.  All of the patient's questions and concerns were addressed.

## 2020-07-19 VITALS
SYSTOLIC BLOOD PRESSURE: 149 MMHG | OXYGEN SATURATION: 90 % | DIASTOLIC BLOOD PRESSURE: 97 MMHG | RESPIRATION RATE: 22 BRPM | HEART RATE: 119 BPM | TEMPERATURE: 98 F

## 2020-07-19 NOTE — ED ADULT TRIAGE NOTE - ARRIVAL INFO ADDITIONAL COMMENTS
pt c/o 1 day of chest pain with sob.  no recent travel pt c/o 1 day of chest pain with sob.  no recent travel.  hx of asthma but pt states this feels nothing like her asthma.

## 2020-07-20 ENCOUNTER — TRANSCRIPTION ENCOUNTER (OUTPATIENT)
Age: 60
End: 2020-07-20

## 2020-07-20 ENCOUNTER — INPATIENT (INPATIENT)
Facility: HOSPITAL | Age: 60
LOS: 1 days | Discharge: HOME | DRG: 202 | End: 2020-07-22
Attending: HOSPITALIST | Admitting: HOSPITALIST
Payer: COMMERCIAL

## 2020-07-20 DIAGNOSIS — R63.8 OTHER SYMPTOMS AND SIGNS CONCERNING FOOD AND FLUID INTAKE: ICD-10-CM

## 2020-07-20 DIAGNOSIS — J45.901 UNSPECIFIED ASTHMA WITH (ACUTE) EXACERBATION: ICD-10-CM

## 2020-07-20 DIAGNOSIS — M94.0 CHONDROCOSTAL JUNCTION SYNDROME [TIETZE]: ICD-10-CM

## 2020-07-20 DIAGNOSIS — R07.9 CHEST PAIN, UNSPECIFIED: ICD-10-CM

## 2020-07-20 DIAGNOSIS — N64.4 MASTODYNIA: ICD-10-CM

## 2020-07-20 DIAGNOSIS — J45.51 SEVERE PERSISTENT ASTHMA WITH (ACUTE) EXACERBATION: ICD-10-CM

## 2020-07-20 DIAGNOSIS — Z90.49 ACQUIRED ABSENCE OF OTHER SPECIFIED PARTS OF DIGESTIVE TRACT: Chronic | ICD-10-CM

## 2020-07-20 DIAGNOSIS — R21 RASH AND OTHER NONSPECIFIC SKIN ERUPTION: ICD-10-CM

## 2020-07-20 DIAGNOSIS — J96.01 ACUTE RESPIRATORY FAILURE WITH HYPOXIA: ICD-10-CM

## 2020-07-20 DIAGNOSIS — R91.8 OTHER NONSPECIFIC ABNORMAL FINDING OF LUNG FIELD: ICD-10-CM

## 2020-07-20 DIAGNOSIS — Z98.89 OTHER SPECIFIED POSTPROCEDURAL STATES: Chronic | ICD-10-CM

## 2020-07-20 DIAGNOSIS — Z29.9 ENCOUNTER FOR PROPHYLACTIC MEASURES, UNSPECIFIED: ICD-10-CM

## 2020-07-20 DIAGNOSIS — J45.50 SEVERE PERSISTENT ASTHMA, UNCOMPLICATED: ICD-10-CM

## 2020-07-20 LAB
ALBUMIN SERPL ELPH-MCNC: 3.5 G/DL — SIGNIFICANT CHANGE UP (ref 3.3–5)
ALP SERPL-CCNC: 95 U/L — SIGNIFICANT CHANGE UP (ref 40–120)
ALT FLD-CCNC: 13 U/L — SIGNIFICANT CHANGE UP (ref 10–45)
ANION GAP SERPL CALC-SCNC: 11 MMOL/L — SIGNIFICANT CHANGE UP (ref 5–17)
APTT BLD: 31.9 SEC — SIGNIFICANT CHANGE UP (ref 27.5–35.5)
AST SERPL-CCNC: 13 U/L — SIGNIFICANT CHANGE UP (ref 10–40)
BASE EXCESS BLDV CALC-SCNC: 0.5 MMOL/L — SIGNIFICANT CHANGE UP
BASOPHILS # BLD AUTO: 0.09 K/UL — SIGNIFICANT CHANGE UP (ref 0–0.2)
BASOPHILS NFR BLD AUTO: 1 % — SIGNIFICANT CHANGE UP (ref 0–2)
BILIRUB SERPL-MCNC: 0.4 MG/DL — SIGNIFICANT CHANGE UP (ref 0.2–1.2)
BUN SERPL-MCNC: 18 MG/DL — SIGNIFICANT CHANGE UP (ref 7–23)
CA-I SERPL-SCNC: 1.12 MMOL/L — SIGNIFICANT CHANGE UP (ref 1.12–1.3)
CALCIUM SERPL-MCNC: 8.8 MG/DL — SIGNIFICANT CHANGE UP (ref 8.4–10.5)
CHLORIDE SERPL-SCNC: 103 MMOL/L — SIGNIFICANT CHANGE UP (ref 96–108)
CK SERPL-CCNC: 28 U/L — SIGNIFICANT CHANGE UP (ref 25–170)
CO2 SERPL-SCNC: 23 MMOL/L — SIGNIFICANT CHANGE UP (ref 22–31)
CREAT SERPL-MCNC: 0.6 MG/DL — SIGNIFICANT CHANGE UP (ref 0.5–1.3)
CRP SERPL-MCNC: 1.3 MG/DL — HIGH (ref 0–0.4)
D DIMER BLD IA.RAPID-MCNC: <150 NG/ML DDU — SIGNIFICANT CHANGE UP
EOSINOPHIL # BLD AUTO: 0.16 K/UL — SIGNIFICANT CHANGE UP (ref 0–0.5)
EOSINOPHIL NFR BLD AUTO: 1.8 % — SIGNIFICANT CHANGE UP (ref 0–6)
FERRITIN SERPL-MCNC: 121 NG/ML — SIGNIFICANT CHANGE UP (ref 15–150)
FIBRINOGEN PPP-MCNC: 411 MG/DL — SIGNIFICANT CHANGE UP (ref 258–438)
GAS PNL BLDV: 137 MMOL/L — LOW (ref 138–146)
GAS PNL BLDV: SIGNIFICANT CHANGE UP
GAS PNL BLDV: SIGNIFICANT CHANGE UP
GLUCOSE SERPL-MCNC: 130 MG/DL — HIGH (ref 70–99)
HCO3 BLDV-SCNC: 25 MMOL/L — SIGNIFICANT CHANGE UP (ref 20–27)
HCT VFR BLD CALC: 42.3 % — SIGNIFICANT CHANGE UP (ref 34.5–45)
HGB BLD-MCNC: 14 G/DL — SIGNIFICANT CHANGE UP (ref 11.5–15.5)
IMM GRANULOCYTES NFR BLD AUTO: 0.2 % — SIGNIFICANT CHANGE UP (ref 0–1.5)
INR BLD: 1 — SIGNIFICANT CHANGE UP (ref 0.88–1.16)
LACTATE SERPL-SCNC: 1.3 MMOL/L — SIGNIFICANT CHANGE UP (ref 0.5–2)
LDH SERPL L TO P-CCNC: 201 U/L — SIGNIFICANT CHANGE UP (ref 50–242)
LYMPHOCYTES # BLD AUTO: 2.37 K/UL — SIGNIFICANT CHANGE UP (ref 1–3.3)
LYMPHOCYTES # BLD AUTO: 27.3 % — SIGNIFICANT CHANGE UP (ref 13–44)
MCHC RBC-ENTMCNC: 29.4 PG — SIGNIFICANT CHANGE UP (ref 27–34)
MCHC RBC-ENTMCNC: 33.1 GM/DL — SIGNIFICANT CHANGE UP (ref 32–36)
MCV RBC AUTO: 88.7 FL — SIGNIFICANT CHANGE UP (ref 80–100)
MONOCYTES # BLD AUTO: 0.66 K/UL — SIGNIFICANT CHANGE UP (ref 0–0.9)
MONOCYTES NFR BLD AUTO: 7.6 % — SIGNIFICANT CHANGE UP (ref 2–14)
NEUTROPHILS # BLD AUTO: 5.39 K/UL — SIGNIFICANT CHANGE UP (ref 1.8–7.4)
NEUTROPHILS NFR BLD AUTO: 62.1 % — SIGNIFICANT CHANGE UP (ref 43–77)
NRBC # BLD: 0 /100 WBCS — SIGNIFICANT CHANGE UP (ref 0–0)
NT-PROBNP SERPL-SCNC: 5 PG/ML — SIGNIFICANT CHANGE UP (ref 0–300)
PCO2 BLDV: 40 MMHG — LOW (ref 41–51)
PH BLDV: 7.42 — SIGNIFICANT CHANGE UP (ref 7.32–7.43)
PLATELET # BLD AUTO: 283 K/UL — SIGNIFICANT CHANGE UP (ref 150–400)
PO2 BLDV: 54 MMHG — SIGNIFICANT CHANGE UP
POTASSIUM BLDV-SCNC: 3.6 MMOL/L — SIGNIFICANT CHANGE UP (ref 3.5–4.9)
POTASSIUM SERPL-MCNC: 3.8 MMOL/L — SIGNIFICANT CHANGE UP (ref 3.5–5.3)
POTASSIUM SERPL-SCNC: 3.8 MMOL/L — SIGNIFICANT CHANGE UP (ref 3.5–5.3)
PROCALCITONIN SERPL-MCNC: 0.03 NG/ML — SIGNIFICANT CHANGE UP (ref 0.02–0.1)
PROT SERPL-MCNC: 7.8 G/DL — SIGNIFICANT CHANGE UP (ref 6–8.3)
PROTHROM AB SERPL-ACNC: 12 SEC — SIGNIFICANT CHANGE UP (ref 10.6–13.6)
RAPID RVP RESULT: SIGNIFICANT CHANGE UP
RBC # BLD: 4.77 M/UL — SIGNIFICANT CHANGE UP (ref 3.8–5.2)
RBC # FLD: 13.1 % — SIGNIFICANT CHANGE UP (ref 10.3–14.5)
SAO2 % BLDV: 89 % — SIGNIFICANT CHANGE UP
SARS-COV-2 RNA SPEC QL NAA+PROBE: SIGNIFICANT CHANGE UP
SODIUM SERPL-SCNC: 137 MMOL/L — SIGNIFICANT CHANGE UP (ref 135–145)
TROPONIN T SERPL-MCNC: <0.01 NG/ML — SIGNIFICANT CHANGE UP (ref 0–0.01)
WBC # BLD: 8.69 K/UL — SIGNIFICANT CHANGE UP (ref 3.8–10.5)
WBC # FLD AUTO: 8.69 K/UL — SIGNIFICANT CHANGE UP (ref 3.8–10.5)

## 2020-07-20 PROCEDURE — 71250 CT THORAX DX C-: CPT | Mod: 26

## 2020-07-20 PROCEDURE — 99285 EMERGENCY DEPT VISIT HI MDM: CPT | Mod: 25

## 2020-07-20 PROCEDURE — 93010 ELECTROCARDIOGRAM REPORT: CPT

## 2020-07-20 PROCEDURE — 99223 1ST HOSP IP/OBS HIGH 75: CPT | Mod: GC

## 2020-07-20 PROCEDURE — 71045 X-RAY EXAM CHEST 1 VIEW: CPT | Mod: 26

## 2020-07-20 RX ORDER — IBUPROFEN 200 MG
200 TABLET ORAL EVERY 6 HOURS
Refills: 0 | Status: DISCONTINUED | OUTPATIENT
Start: 2020-07-20 | End: 2020-07-22

## 2020-07-20 RX ORDER — ALBUTEROL 90 UG/1
1 AEROSOL, METERED ORAL EVERY 4 HOURS
Refills: 0 | Status: DISCONTINUED | OUTPATIENT
Start: 2020-07-20 | End: 2020-07-22

## 2020-07-20 RX ORDER — BUDESONIDE AND FORMOTEROL FUMARATE DIHYDRATE 160; 4.5 UG/1; UG/1
2 AEROSOL RESPIRATORY (INHALATION)
Refills: 0 | Status: DISCONTINUED | OUTPATIENT
Start: 2020-07-20 | End: 2020-07-22

## 2020-07-20 RX ORDER — ALBUTEROL 90 UG/1
2 AEROSOL, METERED ORAL ONCE
Refills: 0 | Status: COMPLETED | OUTPATIENT
Start: 2020-07-20 | End: 2020-07-20

## 2020-07-20 RX ORDER — IPRATROPIUM/ALBUTEROL SULFATE 18-103MCG
3 AEROSOL WITH ADAPTER (GRAM) INHALATION EVERY 6 HOURS
Refills: 0 | Status: DISCONTINUED | OUTPATIENT
Start: 2020-07-20 | End: 2020-07-22

## 2020-07-20 RX ORDER — IPRATROPIUM/ALBUTEROL SULFATE 18-103MCG
3 AEROSOL WITH ADAPTER (GRAM) INHALATION
Refills: 0 | Status: COMPLETED | OUTPATIENT
Start: 2020-07-20 | End: 2020-07-20

## 2020-07-20 RX ORDER — TIOTROPIUM BROMIDE 18 UG/1
1 CAPSULE ORAL; RESPIRATORY (INHALATION) DAILY
Refills: 0 | Status: DISCONTINUED | OUTPATIENT
Start: 2020-07-20 | End: 2020-07-22

## 2020-07-20 RX ADMIN — Medication 125 MILLIGRAM(S): at 01:40

## 2020-07-20 RX ADMIN — Medication 40 MILLIGRAM(S): at 14:13

## 2020-07-20 RX ADMIN — Medication 3 MILLILITER(S): at 02:15

## 2020-07-20 RX ADMIN — Medication 3 MILLILITER(S): at 09:21

## 2020-07-20 RX ADMIN — Medication 3 MILLILITER(S): at 16:05

## 2020-07-20 RX ADMIN — Medication 3 MILLILITER(S): at 02:51

## 2020-07-20 RX ADMIN — BUDESONIDE AND FORMOTEROL FUMARATE DIHYDRATE 2 PUFF(S): 160; 4.5 AEROSOL RESPIRATORY (INHALATION) at 19:02

## 2020-07-20 RX ADMIN — Medication 3 MILLILITER(S): at 02:35

## 2020-07-20 NOTE — H&P ADULT - PROBLEM SELECTOR PLAN 1
Pt presenting with CP of about one day a/w SOB. Pain is reproducible with movement and worsened on inspiration. PE Wells score 1.5 (tachycardia) and D-dimer <150. Troponin negative and EKG showing no ischemic changes. Pain is likely in setting of an asthma exacerbation with cough.   - Tx for asthma exacerbation as below

## 2020-07-20 NOTE — CONSULT NOTE ADULT - SUBJECTIVE AND OBJECTIVE BOX
PULMONARY SERVICE INITIAL CONSULT NOTE    HPI:  60F w/ pmhx poorly controlled asthma with multiple exacerbation only requiring visits to the ED is presenting with CP that started yesterday while cleaning with Clorox. The pt denies any wheezing that occurred but noted that her chest felt tight and she had a dry cough. She had a chest X-ray done on admission which was unremarkable and a CT chest was done which showed mosaic attenuation concerning for air trapping. The pulmonary consult service was asked to evaluate the patient given these CT scan findings. The pt is a former smoker with a 12 PYH, quit over 15 years ago, and has a pet dog at home. She notes some seasonal allergies but notes that they do not impact her life in any significant way. 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 The pt also notes mold in her bathroom that has yet to be dealt with. She denies any sick contacts and has been mostly at home for the past several months and wears a mask when she goes to the store. The pt was diagnosed with asthma 2-3 years ago, did not suffer with it as a child, and was only recently (within the last week) started on an ICS (Flovent) and was given a prescription for 20mg prednisone. The pt still notes chest tightness but denies wheezing. She still feels somewhat SOB when she moves around.       REVIEW OF SYSTEMS:  A 12 point ROS was reviewed with the patient and was negative except for what is mentioned above.     Allergy and Immunologic: No hives or eczema    PAST MEDICAL & SURGICAL HISTORY:  Fibroid  Asthma  S/P  section  S/P cholecystectomy      FAMILY HISTORY:  Family history of renal disease  FH: breast cancer  FH: lymphoma      SOCIAL HISTORY:  Smoking Status: [ ] Current, [X ] Former, [ ] Never  Pack Years: 12 years     MEDICATIONS:  Pulmonary:  ALBUTerol    90 MICROgram(s) HFA Inhaler 1 Puff(s) Inhalation every 4 hours  albuterol/ipratropium for Nebulization 3 milliLiter(s) Nebulizer every 6 hours  budesonide  80 MICROgram(s)/formoterol 4.5 MICROgram(s) Inhaler 2 Puff(s) Inhalation two times a day  tiotropium 18 MICROgram(s) Capsule 1 Capsule(s) Inhalation daily    Antimicrobials:    Anticoagulants:    Onc:    GI/:    Endocrine:  predniSONE   Tablet 40 milliGRAM(s) Oral every 24 hours    Cardiac:    Other Medications:      Allergies    allerigc to contrast dye (Short breath; Angioedema)  IV Contrast (Short breath; Angioedema)    Intolerances        Vital Signs Last 24 Hrs  T(C): 36.7 (2020 09:47), Max: 36.8 (2020 23:55)  T(F): 98.1 (2020 09:47), Max: 98.3 (2020 04:00)  HR: 114 (2020 09:47) (93 - 119)  BP: 124/75 (2020 09:47) (107/68 - 149/97)  BP(mean): 115 (2020 00:25) (115 - 115)  RR: 18 (2020 09:47) (17 - 23)  SpO2: 95% (2020 09:47) (86% - 97%)        PHYSICAL EXAM:  Constitutional: WDWN  Head: NC/AT  EENT: PERRL, anicteric sclera; oropharynx clear, MMM  Neck: supple, no appreciable JVD  Respiratory: Poor air entry however no wheezes heard, No rales, no rhonchi, Good respiratory effort.   Cardiovascular: +S1/S2, RRR  Gastrointestinal: soft, NT/ND; +BSx4  Extremities: WWP; no edema, clubbing or cyanosis  Vascular: 2+ radial, DP/PT pulses B/L  Neurological: AAOx3; no focal deficits    LABS:      CBC Full  -  ( 2020 00:46 )  WBC Count : 8.69 K/uL  RBC Count : 4.77 M/uL  Hemoglobin : 14.0 g/dL  Hematocrit : 42.3 %  Platelet Count - Automated : 283 K/uL  Mean Cell Volume : 88.7 fl  Mean Cell Hemoglobin : 29.4 pg  Mean Cell Hemoglobin Concentration : 33.1 gm/dL  Auto Neutrophil # : 5.39 K/uL  Auto Lymphocyte # : 2.37 K/uL  Auto Monocyte # : 0.66 K/uL  Auto Eosinophil # : 0.16 K/uL  Auto Basophil # : 0.09 K/uL  Auto Neutrophil % : 62.1 %  Auto Lymphocyte % : 27.3 %  Auto Monocyte % : 7.6 %  Auto Eosinophil % : 1.8 %  Auto Basophil % : 1.0 %    -20    137  |  103  |  18  ----------------------------<  130<H>  3.8   |  23  |  0.60    Ca    8.8      2020 00:46    TPro  7.8  /  Alb  3.5  /  TBili  0.4  /  DBili  x   /  AST  13  /  ALT  13  /  AlkPhos  95  07-20    PT/INR - ( 2020 00:46 )   PT: 12.0 sec;   INR: 1.00          PTT - ( 2020 00:46 )  PTT:31.9 sec                  RADIOLOGY & ADDITIONAL STUDIES:  Chest CT  Chest X-Ray

## 2020-07-20 NOTE — PROGRESS NOTE ADULT - SUBJECTIVE AND OBJECTIVE BOX
OVERNIGHT EVENTS:    SUBJECTIVE / INTERVAL HPI: Patient seen and examined at bedside.     VITAL SIGNS:  Vital Signs Last 24 Hrs  T(C): 36.7 (20 Jul 2020 09:47), Max: 36.8 (19 Jul 2020 23:55)  T(F): 98.1 (20 Jul 2020 09:47), Max: 98.3 (20 Jul 2020 04:00)  HR: 114 (20 Jul 2020 09:47) (93 - 119)  BP: 124/75 (20 Jul 2020 09:47) (107/68 - 149/97)  BP(mean): 115 (20 Jul 2020 00:25) (115 - 115)  RR: 18 (20 Jul 2020 09:47) (17 - 23)  SpO2: 95% (20 Jul 2020 09:47) (86% - 97%)    PHYSICAL EXAM:    General: Well developed, well nourished, no acute distress  HEENT: NC/AT; PERRL, anicteric sclera; MMM  Neck: supple  Cardiovascular: +S1/S2, RRR, no murmurs, rubs, gallops  Respiratory: CTAB; no W/R/R  Gastrointestinal: soft, NT/ND; +BSx4  Extremities: warm and well-perfused; no edema, clubbing or cyanosis  Vascular: 2+ radial, DP/PT pulses B/L  Neurological: AAOx3; no focal deficits    MEDICATIONS:  MEDICATIONS  (STANDING):  ALBUTerol    90 MICROgram(s) HFA Inhaler 1 Puff(s) Inhalation every 4 hours  albuterol/ipratropium for Nebulization 3 milliLiter(s) Nebulizer every 6 hours  budesonide  80 MICROgram(s)/formoterol 4.5 MICROgram(s) Inhaler 2 Puff(s) Inhalation two times a day  predniSONE   Tablet 40 milliGRAM(s) Oral every 24 hours  tiotropium 18 MICROgram(s) Capsule 1 Capsule(s) Inhalation daily    MEDICATIONS  (PRN):  ibuprofen  Tablet. 200 milliGRAM(s) Oral every 6 hours PRN Mild Pain (1 - 3), Moderate Pain (4 - 6)      ALLERGIES:  Allergies    allerigc to contrast dye (Short breath; Angioedema)  IV Contrast (Short breath; Angioedema)    Intolerances        LABS:                        14.0   8.69  )-----------( 283      ( 20 Jul 2020 00:46 )             42.3     07-20    137  |  103  |  18  ----------------------------<  130<H>  3.8   |  23  |  0.60    Ca    8.8      20 Jul 2020 00:46    TPro  7.8  /  Alb  3.5  /  TBili  0.4  /  DBili  x   /  AST  13  /  ALT  13  /  AlkPhos  95  07-20    PT/INR - ( 20 Jul 2020 00:46 )   PT: 12.0 sec;   INR: 1.00          PTT - ( 20 Jul 2020 00:46 )  PTT:31.9 sec    CAPILLARY BLOOD GLUCOSE          RADIOLOGY & ADDITIONAL TESTS: Reviewed.    PLAN: OVERNIGHT EVENTS: Admitted    SUBJECTIVE / INTERVAL HPI: Patient seen and examined at bedside.   Reports CP from initial presentation resolved with being given meds in ED.  Reports coughing has improved as well. Will have occasional dry cough that is non-productive and no blood with it.  Denies wheezing this AM.  No fever, chills, CP, SOB, headache, dizziness.    VITAL SIGNS:  Vital Signs Last 24 Hrs  T(C): 36.7 (20 Jul 2020 09:47), Max: 36.8 (19 Jul 2020 23:55)  T(F): 98.1 (20 Jul 2020 09:47), Max: 98.3 (20 Jul 2020 04:00)  HR: 114 (20 Jul 2020 09:47) (93 - 119)  BP: 124/75 (20 Jul 2020 09:47) (107/68 - 149/97)  BP(mean): 115 (20 Jul 2020 00:25) (115 - 115)  RR: 18 (20 Jul 2020 09:47) (17 - 23)  SpO2: 95% (20 Jul 2020 09:47) (86% - 97%)    PHYSICAL EXAM:  General: Well developed, well nourished, no acute distress  HEENT: NC/AT; PERRL, anicteric sclera; MMM  Neck: supple  Cardiovascular: +S1/S2, RRR, no murmurs, rubs, gallops  Respiratory: no wheezing, mild crackles heard in mid to base of lungs bilaterally  Gastrointestinal: soft, NT/ND; +BSx4  Extremities: warm and well-perfused; no edema, clubbing or cyanosis  Vascular: 2+ radial, DP pulses B/L  Neurological: AAOx3; CNII-CNXII grossly intact; no focal deficits    MEDICATIONS:  MEDICATIONS  (STANDING):  ALBUTerol    90 MICROgram(s) HFA Inhaler 1 Puff(s) Inhalation every 4 hours  albuterol/ipratropium for Nebulization 3 milliLiter(s) Nebulizer every 6 hours  budesonide  80 MICROgram(s)/formoterol 4.5 MICROgram(s) Inhaler 2 Puff(s) Inhalation two times a day  predniSONE   Tablet 40 milliGRAM(s) Oral every 24 hours  tiotropium 18 MICROgram(s) Capsule 1 Capsule(s) Inhalation daily    MEDICATIONS  (PRN):  ibuprofen  Tablet. 200 milliGRAM(s) Oral every 6 hours PRN Mild Pain (1 - 3), Moderate Pain (4 - 6)    ALLERGIES:  Allergies    allerigc to contrast dye (Short breath; Angioedema)  IV Contrast (Short breath; Angioedema)    LABS:                        14.0   8.69  )-----------( 283      ( 20 Jul 2020 00:46 )             42.3     07-20    137  |  103  |  18  ----------------------------<  130<H>  3.8   |  23  |  0.60    Ca    8.8      20 Jul 2020 00:46    TPro  7.8  /  Alb  3.5  /  TBili  0.4  /  DBili  x   /  AST  13  /  ALT  13  /  AlkPhos  95  07-20    PT/INR - ( 20 Jul 2020 00:46 )   PT: 12.0 sec;   INR: 1.00          PTT - ( 20 Jul 2020 00:46 )  PTT:31.9 sec      RADIOLOGY & ADDITIONAL TESTS: Reviewed.

## 2020-07-20 NOTE — DISCHARGE NOTE PROVIDER - NSDCCPCAREPLAN_GEN_ALL_CORE_FT
PRINCIPAL DISCHARGE DIAGNOSIS  Diagnosis: Asthma exacerbation  Assessment and Plan of Treatment: Asthma is a condition that can make it hard to breathe. Asthma symptoms can be mild or severe. And they can come and go. Sometimes asthma symptoms start all of a sudden. Asthma attacks happen when the airways in the lungs become narrow and inflamed. Asthma can run in families. Asthma symptoms can include wheezing, tightness in chest, and shortness of breath. Symptoms can happen each day, each week, or less often. Symptoms can range from mild to severe. Although it is rare, an episode of asthma can sometimes even lead to death. Pulmonary function tests can be done to evaluate the severity of your asthma. Asthma testing can also be done to identify any major triggers to your asthma. You will be having all of this testing done with a pulmnologist at an outpatient appointment after being discharged. It is important to continue taking the medications prescribed to you upon discharge in order to keep your asthma under control.

## 2020-07-20 NOTE — PROGRESS NOTE ADULT - PROBLEM SELECTOR PLAN 6
F - none  E - replete PRN  N - regular diet  A - SCDs, low risk for DVT as described above  Full Code  Dispo: [facility]

## 2020-07-20 NOTE — DISCHARGE NOTE PROVIDER - NSDCFUSCHEDAPPT_GEN_ALL_CORE_FT
HAMZAH SIERRA ; 08/19/2020 ; [facility] [address] HAMZAH SIERRA ; 08/19/2020 ; [facility] [address] HAMZAH SIERRA ; 08/19/2020 ; [facility] [address] HAMZAH SIERRA ; 08/19/2020 ; [facility] [address]

## 2020-07-20 NOTE — ED ADULT NURSE NOTE - OBJECTIVE STATEMENT
SOB/MONTERO x 1 week -CP started today  h/o asthma  Started on Prednisone and MDI's 2-3 days ago with no relief  On initial assessment: labored, tachypneic and hypoxic at rest on RA

## 2020-07-20 NOTE — H&P ADULT - PROBLEM SELECTOR PLAN 2
Pt presenting with CP a/w SOB as well as wheezing upon admission into the ED. Wheezes cleared s/p duonebs and solumedrol. CXR unchanged from previous admission, CT chest showing diffuse GGO B/L. VBG showing mild resp alkalosis. No accessory muscle usage, requiring 3L NC for mid 90s O2 sat. Possibility of other ILD in the setting of B/L diffuse GGOs.    - Initiating 40mg prednisone QD for mild exacerbation   - Duonebs q6h   - Consult pulm for asthma management vs. workup of underlying ILD Pt presenting with CP a/w SOB as well as wheezing upon admission into the ED. Wheezes cleared s/p duonebs and solumedrol. CXR unchanged from previous admission, CT chest showing diffuse GGO B/L. VBG showing mild resp alkalosis. No accessory muscle usage, requiring 3L NC for mid 90s O2 sat. Possibility of other ILD in the setting of B/L diffuse GGOs.    - Solumedrol at 1am, consider starting PO steroid   - Duonebs q6h   - Consult pulm for asthma management vs. workup of underlying ILD Pt presenting with CP a/w SOB as well as wheezing upon admission into the ED. Wheezes cleared s/p duonebs and solumedrol. CXR unchanged from previous admission, CT chest showing diffuse GGO B/L. VBG showing mild resp alkalosis. No accessory muscle usage, requiring 3L NC for mid 90s O2 sat. Possibility of other ILD in the setting of B/L diffuse GGOs (however could be representing airtrapping)  - Solumedrol at 1am, consider starting PO steroid   - Duonebs q6h   - Consult pulm for asthma management vs. workup of underlying ILD

## 2020-07-20 NOTE — H&P ADULT - NSHPLABSRESULTS_GEN_ALL_CORE
.  LABS:                         14.0   8.69  )-----------( 283      ( 20 Jul 2020 00:46 )             42.3     07-20    137  |  103  |  18  ----------------------------<  130<H>  3.8   |  23  |  0.60    Ca    8.8      20 Jul 2020 00:46    TPro  7.8  /  Alb  3.5  /  TBili  0.4  /  DBili  x   /  AST  13  /  ALT  13  /  AlkPhos  95  07-20    PT/INR - ( 20 Jul 2020 00:46 )   PT: 12.0 sec;   INR: 1.00          PTT - ( 20 Jul 2020 00:46 )  PTT:31.9 sec    Serum Pro-Brain Natriuretic Peptide: 5 pg/mL (07-20 @ 00:46)    Lactate, Blood: 1.3 mmol/L (07-20 @ 00:46)      RADIOLOGY, EKG & ADDITIONAL TESTS: Reviewed.

## 2020-07-20 NOTE — H&P ADULT - ASSESSMENT
60F w/ pmhx poorly controlled asthma with multiple exacerbation only requiring visits to the ED is presenting with CP that started yesterday while cleaning with Clorox and a/w SOB. Likely in setting severe persistent poorly controlled asthma

## 2020-07-20 NOTE — H&P ADULT - PROBLEM SELECTOR PLAN 4
Pt presenting with butterfly-like rash on her face that spreads up to her forehead, endorses worsening when exposed to light and with exposures to perfumes. Pt also endorses having rashes on her flexor surfaces after exposure to the sun. Likely atopic dermatitis vs. possible SLE  - AM PAULA Pt presenting with butterfly-like rash on her face that spreads up to her forehead, endorses worsening when exposed to light and with exposures to perfumes. Pt also endorses having rashes on her flexor surfaces after exposure to the sun. Likely atopic dermatitis vs. rosacea vs. possible SLE  - AM PAULA

## 2020-07-20 NOTE — DISCHARGE NOTE PROVIDER - HOSPITAL COURSE
#Discharge: do not delete        Patient is __ yo M/F with past medical history of _____    Presented with _____, found to have _____    Problem List/Main Diagnoses (system-based):     Inpatient treatment course:     New medications:     Labs to be followed outpatient:     Exam to be followed outpatient: #Discharge: do not delete        Patient is 61 yo F with past medical history of poorly controlled asthma with multiple exacerbations (only requiring ED visits).    Presented with CP and increased SOB that began yesterday while cleaning home with Clorox, found to have most likely cleaning agent-triggered asthma exacerbation in setting of severe persistent poorly-controlled asthma.        Problem List/Main Diagnoses (system-based):     1) Asthma exacerbation: Pt presenting with CP a/w SOB as well as wheezing upon admission into the ED. Wheezes cleared s/p duonebs and solumedrol. CXR unchanged from previous admission, CT chest showing diffuse ground glass opacities bilaterally that were deemed by radiology & pulmonology to be associated with air-trapping. VBG showing mild resp alkalosis.    - most likely triggered by usage of Clorox in home and increased mold in bathroom as noted by patient    - COVID neg, RVP neg    - started on prednisone 40mg and symbicort (instead of home flovent)    - continued Duonebs q6h         2) Severe persistent asthma: Pt presenting with worsening shortness of breath and asthma related symptoms since March, symptoms everyday, daily albuterol use and decrease in normal activity. Over the past two months the pt has also had daily night time awakenings.        3) Chest pain: ruled out PE and cardiac etiologies; found to have costochondritis on exam that was likely contributed to by acute asthma exacerbation with cough    - treated with ibuprofen 200mg q6h PRN pain        4) Breast pain, left: Reports L breast pain for about a year now with pressure. Had mammogram done 1 year ago that was reportedly negative.    - positive FH for breast cancer    - endorsed unintentional 10lb weight loss 1mo and night sweats past 2 years, concerning for possible malignancy    - incidental finding of 1.3 x 0.6cm soft tissue nodule in L breast on CT chest    - will need to have f/u in outpatient for repeat mammogram.        Inpatient treatment course: Seen by pulmonology who adjusted patient's medications, replacing flovent with symbicort. Started on prednisone 40mg instead of home dose of 20mg (was likely on a steroid taper). Was initially placed on 3L NC and then weaned off.         New medications: prednisone 40mg, Symbicort        Labs to be followed outpatient: PFTs, asthma panel        Exam to be followed outpatient: N/A

## 2020-07-20 NOTE — H&P ADULT - NSHPSOCIALHISTORY_GEN_ALL_CORE
Tobacco use, quit 15-20 years ago, 12.5 pack year history. No illicit drug use, no EtOH use. Lives with . Walks without assistance

## 2020-07-20 NOTE — CONSULT NOTE ADULT - ASSESSMENT
This is a 61 y/o woman with a hx of uncontrolled asthma with frequent exacerbations in the past several months who presents with cough, SOB, and chest tightness after cleaning with Chlorox.

## 2020-07-20 NOTE — H&P ADULT - PROBLEM SELECTOR PLAN 3
Pt presenting with worsening shortness of breath and asthma related symptoms since March, symptoms everyday, daily albuterol use and decrease in normal activity. Over the past two months the pt has also had daily night time awakenings.  - Consult pulm for asthma control as above   - Treatment of exacerbation as above

## 2020-07-20 NOTE — CONSULT NOTE ADULT - PROBLEM SELECTOR RECOMMENDATION 9
-Likely related to an acute exacerbation of her asthma. Per the patients story she was likely triggered by cleaning with Chlorox which may have aggravated her asthma that was very poorly controlled at baseline, there may also be an element of mold exposure.   -CT scan was reviewed which showed mosaic attenuation diffusely bilaterally and more prominent in the lower lobes. The differential for this includes air trapping (most likely in this case and 2/2 asthma), atypical  and viral infection, CTEPH (no hx of VTE, negative D-Dimer), and hypersensitivity pneumonitis (usually more profound symptoms and hypoxemia acutely and usually upper lobe predominant on CT scan).   -No signs of active bacterial pneumonia based on clinical exam, hx, and imaging. Procalcitonin was negative.   -COVID swab negative, antibodies pending     Recommend:  -Pt recently started on flovent by her PMD, would favor starting an ICS/LABA such as symbicort or alternatively Advair or Breo (whichever is covered by insurance, can use symbicort while inpatient). Would also recommend the patient have home albuterol nebulizers available at home for PRN use.   -Would also continue pts home ventolin for PRN use.   -Pt not wheezy on exam but sounds very tight, would recommend a short course of 40mg prednisone to complete 5 days.   -Please send an RVP and f/u COVID AB  -Pt satting 94% on RA, required O2 upon admission, pt will need to have an ambulatory O2 sat done prior to discharge to eval for need for home O2.   -Pt will need to follow closely with pulmonology, please have pt follow up in the fellows clinic with Dr. Brown. She will need full PFTs at that time and more workup for her asthma including an asthma allergy panel and IgE levels once off steroids.

## 2020-07-20 NOTE — H&P ADULT - HISTORY OF PRESENT ILLNESS
60F w/ pmhx poorly controlled asthma with multiple exacerbation only requiring visits to the ED is presenting with CP that started yesterday while cleaning with Clorox and a/w SOB. The pain is provoked by coughing, deep breaths and positional changes, is described at a pressure and sharp pain that does not radiate and was initially a 8/10, but now a 5/10. This chest pain has never occurred before. The patient notes her shortness of breath has worsened since March with daily symptoms requiring albuterol txs and severely decreased activity ability (used to be able to go out for groceries and walk a mile but now gets SOB with housework), additionally, the pt has had daily nighttime cough and SOB for the past two months. The patient denies any new environmental exposures, sick contact, productive cough, fever/chills, abd pain, urinary symptoms, or change in bowel patterns. The patient notes that she has had mold growing in her bathroom for years now. The patient endorses 2 years of night sweats that has recently been a/w 10lb weight loss over the past month that was unintentional and has noticed decreased appetite in that same time. The patient also complains of this rash on her face that is currently present but she said it worsens with certain food exposures and the sun and is sometimes a/w a rash on her flexor arm surfaces. The patient was also told by a doctor last year that arthritis was causing her to have B/L shoulder pain that self-resolved.       ED Vitals: Afeb HR 110s, -->140/80-90s, RR 22 RA-->23 RA-->20 3L NC, O2 90 RA-->86 RA-->85 3L NC s/p multiple duonebs and solumedrol   Labs s/f: D-dimer <150, VBG pH 7.42 w/ CO2 40, CRP 1.3, trop negative   Imaging: CXR unchanged from last admission. CT chest showing diffuse B/L GGOs  EKG: Sinus Tach at 128 with no ischemic changes 60F w/ pmhx poorly controlled asthma with multiple exacerbation only requiring visits to the ED is presenting with CP that started yesterday while cleaning with Clorox and a/w SOB. The pain is provoked by coughing, deep breaths and positional changes, is described at a pressure and sharp pain that does not radiate and was initially a 8/10, but now a 5/10. This chest pain has never occurred before. The patient notes her shortness of breath has worsened since March with daily symptoms requiring albuterol txs and severely decreased activity ability (used to be able to go out for groceries and walk a mile but now gets SOB with housework), additionally, the pt has had daily nighttime cough and SOB for the past two months. The patient denies any new environmental exposures, sick contact, productive cough, fever/chills, abd pain, urinary symptoms, or change in bowel patterns. The patient notes that she has had mold growing in her bathroom for years now. The patient endorses 2 years of night sweats that has recently been a/w 10lb weight loss over the past month that was unintentional and has noticed decreased appetite in that same time. The patient also complains of this rash on her face that is currently present but she said it worsens with certain food exposures and the sun and is sometimes a/w a rash on her flexor arm surfaces. The patient was also told by a doctor last year that arthritis was causing her to have B/L shoulder pain that self-resolved. Denies any oral rashes, other joint pain or swelling.      ED Vitals: Afeb HR 110s, -->140/80-90s, RR 22 RA-->23 RA-->20 3L NC, O2 90 RA-->86 RA-->85 3L NC s/p multiple duonebs and solumedrol   Labs s/f: D-dimer <150, VBG pH 7.42 w/ CO2 40, CRP 1.3, trop negative   Imaging: CXR unchanged from last admission. CT chest showing diffuse B/L GGOs  EKG: Sinus Tach at 128 with no ischemic changes

## 2020-07-20 NOTE — PROGRESS NOTE ADULT - PROBLEM SELECTOR PLAN 5
F - none  E  N  A  Full Code  Dispo: [facility] Reports L breast pain for about a year now with pressure. Had mammogram done 1 year ago that was reportedly negative.  - positive FH for breast cancer  - endorsed unintentional 10lb weight loss 1mo and night sweats past 2 years, concerning for possible malignancy  - incidental finding of 1.3 x 0.6cm soft tissue nodule in L breast on CT chest  - will need to have f/u in outpatient for repeat mammogram

## 2020-07-20 NOTE — ED PROVIDER NOTE - OBJECTIVE STATEMENT
61 yo with ho of asthma, reports increasing SOB since Jan and new CP since yesterday, sharp w inspiration, no ho of COVID, tested neg in March and has been self isolating, no ho of travel, no DVT/ PE, reports being given prednisone by pcp 2 days ago w no relief and taking Albuterol at home, no fevers, dry non productive cough, chest pain is constant and worse w movement and palpation of the chest as well, no trauma, no ho of known CAD, no edema, no leg pain, no myalgias or chills, no n/v/d/

## 2020-07-20 NOTE — ED PROVIDER NOTE - CLINICAL SUMMARY MEDICAL DECISION MAKING FREE TEXT BOX
59 yo with atypical CP and SOB, new hypoxia, + asthma exaccerbation, no apparent pna, consider COVID  , pt afeb w no sick contacts, pneumonitis on CT , unclear cz, pt reports using strong bleach 2 days ago and feeling irritated by this, no PE risk factors and neg d dimer, EKG non ischemic and trop neg, doubt ACS, pt w hypoxia to 86 on arrival , now at 94 on 4 L NC, plan to admit to medicine 59 yo with atypical CP and SOB, new hypoxia, + asthma exacerbation, no apparent pna, consider COVID  , pt afeb w no sick contacts, pneumonitis on CT , unclear cz, pt reports using strong bleach 2 days ago and feeling irritated by this, no PE risk factors and neg d dimer, EKG non ischemic and trop neg, doubt ACS, pt w hypoxia to 86 on arrival , now at 94 on 4 L NC, plan to admit to medicine

## 2020-07-20 NOTE — DISCHARGE NOTE PROVIDER - CARE PROVIDER_API CALL
Ferdinand Teixeira  [facility]  [address], 3rd [place] [address]  Phone: [phone]  Fax: (   )    -  Scheduled Appointment: 08/19/2020 01:30 PM

## 2020-07-20 NOTE — DISCHARGE NOTE PROVIDER - NSDCFUADDAPPT_GEN_ALL_CORE_FT
Please follow-up with your primary care provider. Please contact your PCP and make a follow-up appointment with your primary care provider.     Please also follow-up with pulmonology (lung doctor) Dr. Ferdinand Teixeira scheduled 8/19/20 at 1:30 pm, address listed above.

## 2020-07-20 NOTE — H&P ADULT - NSHPPHYSICALEXAM_GEN_ALL_CORE
.  VITAL SIGNS:  T(C): 36.8 (07-19-20 @ 23:55), Max: 36.8 (07-19-20 @ 23:55)  T(F): 98.2 (07-19-20 @ 23:55), Max: 98.2 (07-19-20 @ 23:55)  HR: 105 (07-20-20 @ 03:03) (104 - 119)  BP: 125/81 (07-20-20 @ 02:00) (125/81 - 149/97)  BP(mean): 115 (07-20-20 @ 00:25) (115 - 115)  RR: 20 (07-20-20 @ 03:03) (20 - 23)  SpO2: 97% (07-20-20 @ 03:03) (86% - 97%)  Wt(kg): --    PHYSICAL EXAM:    Constitutional: WDWN resting comfortably in bed; NAD on 3L NC  Head: NC/AT  Eyes: PERRL, EOMI, anicteric sclera  ENT: no nasal discharge; uvula midline, no oropharyngeal erythema or exudates; MMM  Neck: supple; no JVD  Respiratory: No wheezing audibale, crackles heard on the inferior half orf the posterior fields B/L   Cardiac: +S1/S2; RRR; no M/R/G; PMI non-displaced  Gastrointestinal: abdomen soft, NT/ND; no rebound or guarding; +BSx4  Extremities: WWP, no clubbing or cyanosis; no peripheral edema  Vascular: 2+ radial, DP/PT pulses B/L  Dermatologic: Butterfly like rash with sparing of nasolabial folds that is continuous up on the forehead   Neurologic: AAOx3

## 2020-07-20 NOTE — DISCHARGE NOTE PROVIDER - PROVIDER TOKENS
FREE:[LAST:[Lluvia],FIRST:[Ferdinand],PHONE:[phone]],FAX:[(   [phone]    -],ADDRESS:[[facility]  [address] [place] [address], [address] [place] [address]],SCHEDULEDAPPT:[08/19/2020],SCHEDULEDAPPTTIME:[01:30 PM]]

## 2020-07-20 NOTE — ED PROVIDER NOTE - CONSTITUTIONAL, MLM
normal... Well appearing, awake, alert, oriented to person, place, time/situation , pt appears SOB / tachypneic

## 2020-07-20 NOTE — ED PROVIDER NOTE - X-RAY INTERPRETATION
ER physician/heart normal, bones normal, chronic appearing lung changes same as old xray, no consolidation, fibrosis?

## 2020-07-20 NOTE — ED PROVIDER NOTE - MUSCULOSKELETAL, MLM
Spine appears normal, range of motion is not limited, no muscle or joint tenderness, no calf tendernss, pulses x 4 intact and equal 2 +, no edema

## 2020-07-20 NOTE — DISCHARGE NOTE PROVIDER - NSDCMRMEDTOKEN_GEN_ALL_CORE_FT
albuterol 2.5 mg/3 mL (0.083%) inhalation solution: 3 milliliter(s) inhaled every 6 hours, As Needed  Flovent  mcg/inh inhalation aerosol: 2 puff(s) inhaled 2 times a day  predniSONE 20 mg oral tablet: 2 tab(s) orally once a day for 5 days  triamcinolone 0.1% topical ointment: Apply topically to affected area 2 times a day for 14 days  Ventolin HFA 90 mcg/inh inhalation aerosol: 1 puff(s) inhaled every 6 hours, As Needed albuterol 2.5 mg/3 mL (0.083%) inhalation solution: 3 milliliter(s) inhaled every 6 hours, As Needed  Flovent  mcg/inh inhalation aerosol: 2 puff(s) inhaled 2 times a day  predniSONE 20 mg oral tablet: 2 tab(s) orally every 24 hours  triamcinolone 0.1% topical ointment: Apply topically to affected area 2 times a day for 14 days  Ventolin HFA 90 mcg/inh inhalation aerosol: 1 puff(s) inhaled every 6 hours, As Needed

## 2020-07-21 LAB
ALBUMIN SERPL ELPH-MCNC: 3.6 G/DL — SIGNIFICANT CHANGE UP (ref 3.3–5)
ALP SERPL-CCNC: 95 U/L — SIGNIFICANT CHANGE UP (ref 40–120)
ALT FLD-CCNC: 12 U/L — SIGNIFICANT CHANGE UP (ref 10–45)
ANION GAP SERPL CALC-SCNC: 11 MMOL/L — SIGNIFICANT CHANGE UP (ref 5–17)
AST SERPL-CCNC: 21 U/L — SIGNIFICANT CHANGE UP (ref 10–40)
BASOPHILS # BLD AUTO: 0.03 K/UL — SIGNIFICANT CHANGE UP (ref 0–0.2)
BASOPHILS NFR BLD AUTO: 0.2 % — SIGNIFICANT CHANGE UP (ref 0–2)
BILIRUB SERPL-MCNC: 0.4 MG/DL — SIGNIFICANT CHANGE UP (ref 0.2–1.2)
BUN SERPL-MCNC: 13 MG/DL — SIGNIFICANT CHANGE UP (ref 7–23)
CALCIUM SERPL-MCNC: 9.2 MG/DL — SIGNIFICANT CHANGE UP (ref 8.4–10.5)
CHLORIDE SERPL-SCNC: 108 MMOL/L — SIGNIFICANT CHANGE UP (ref 96–108)
CO2 SERPL-SCNC: 22 MMOL/L — SIGNIFICANT CHANGE UP (ref 22–31)
CREAT SERPL-MCNC: 0.52 MG/DL — SIGNIFICANT CHANGE UP (ref 0.5–1.3)
EOSINOPHIL # BLD AUTO: 0.01 K/UL — SIGNIFICANT CHANGE UP (ref 0–0.5)
EOSINOPHIL NFR BLD AUTO: 0.1 % — SIGNIFICANT CHANGE UP (ref 0–6)
GLUCOSE SERPL-MCNC: 124 MG/DL — HIGH (ref 70–99)
HCT VFR BLD CALC: 42.6 % — SIGNIFICANT CHANGE UP (ref 34.5–45)
HGB BLD-MCNC: 14.1 G/DL — SIGNIFICANT CHANGE UP (ref 11.5–15.5)
IMM GRANULOCYTES NFR BLD AUTO: 0.8 % — SIGNIFICANT CHANGE UP (ref 0–1.5)
LYMPHOCYTES # BLD AUTO: 13.9 % — SIGNIFICANT CHANGE UP (ref 13–44)
LYMPHOCYTES # BLD AUTO: 2.42 K/UL — SIGNIFICANT CHANGE UP (ref 1–3.3)
MAGNESIUM SERPL-MCNC: 2.3 MG/DL — SIGNIFICANT CHANGE UP (ref 1.6–2.6)
MCHC RBC-ENTMCNC: 29.1 PG — SIGNIFICANT CHANGE UP (ref 27–34)
MCHC RBC-ENTMCNC: 33.1 GM/DL — SIGNIFICANT CHANGE UP (ref 32–36)
MCV RBC AUTO: 87.8 FL — SIGNIFICANT CHANGE UP (ref 80–100)
MONOCYTES # BLD AUTO: 1.3 K/UL — HIGH (ref 0–0.9)
MONOCYTES NFR BLD AUTO: 7.5 % — SIGNIFICANT CHANGE UP (ref 2–14)
NEUTROPHILS # BLD AUTO: 13.45 K/UL — HIGH (ref 1.8–7.4)
NEUTROPHILS NFR BLD AUTO: 77.5 % — HIGH (ref 43–77)
NRBC # BLD: 0 /100 WBCS — SIGNIFICANT CHANGE UP (ref 0–0)
NT-PROBNP SERPL-SCNC: 79 PG/ML — SIGNIFICANT CHANGE UP (ref 0–300)
PLATELET # BLD AUTO: 282 K/UL — SIGNIFICANT CHANGE UP (ref 150–400)
POTASSIUM SERPL-MCNC: 4.8 MMOL/L — SIGNIFICANT CHANGE UP (ref 3.5–5.3)
POTASSIUM SERPL-SCNC: 4.8 MMOL/L — SIGNIFICANT CHANGE UP (ref 3.5–5.3)
PROT SERPL-MCNC: 7.7 G/DL — SIGNIFICANT CHANGE UP (ref 6–8.3)
RBC # BLD: 4.85 M/UL — SIGNIFICANT CHANGE UP (ref 3.8–5.2)
RBC # FLD: 13.1 % — SIGNIFICANT CHANGE UP (ref 10.3–14.5)
SODIUM SERPL-SCNC: 141 MMOL/L — SIGNIFICANT CHANGE UP (ref 135–145)
WBC # BLD: 17.35 K/UL — HIGH (ref 3.8–10.5)
WBC # FLD AUTO: 17.35 K/UL — HIGH (ref 3.8–10.5)

## 2020-07-21 PROCEDURE — 99233 SBSQ HOSP IP/OBS HIGH 50: CPT | Mod: GC

## 2020-07-21 PROCEDURE — 93306 TTE W/DOPPLER COMPLETE: CPT | Mod: 26

## 2020-07-21 PROCEDURE — 99232 SBSQ HOSP IP/OBS MODERATE 35: CPT | Mod: GC

## 2020-07-21 RX ADMIN — Medication 200 MILLIGRAM(S): at 19:27

## 2020-07-21 RX ADMIN — BUDESONIDE AND FORMOTEROL FUMARATE DIHYDRATE 2 PUFF(S): 160; 4.5 AEROSOL RESPIRATORY (INHALATION) at 05:37

## 2020-07-21 RX ADMIN — Medication 3 MILLILITER(S): at 22:11

## 2020-07-21 RX ADMIN — Medication 3 MILLILITER(S): at 05:37

## 2020-07-21 RX ADMIN — Medication 200 MILLIGRAM(S): at 18:23

## 2020-07-21 RX ADMIN — Medication 40 MILLIGRAM(S): at 12:28

## 2020-07-21 NOTE — PROGRESS NOTE ADULT - PROBLEM SELECTOR PLAN 5
F - none  E - replete PRN  N - regular diet  A - SCDs, low risk for DVT as described above  Full Code  Dispo: RMF, likely d/c tomorrow pending O2 sats

## 2020-07-21 NOTE — PROGRESS NOTE ADULT - PROBLEM SELECTOR PLAN 4
Reports L breast pain for about a year now with pressure. Had mammogram done 1 year ago that was reportedly negative.  - positive FH for breast cancer  - endorsed unintentional 10lb weight loss 1mo and night sweats past 2 years, concerning for possible malignancy  - incidental finding of 1.3 x 0.6cm soft tissue nodule in L breast on CT chest  - will need to have f/u in outpatient
pain on palpation of midsternal region consistent with costochondritis  - likely developed in setting of acute asthma exacerbation and cough  - ibuprofen

## 2020-07-21 NOTE — PROGRESS NOTE ADULT - SUBJECTIVE AND OBJECTIVE BOX
PULMONARY CONSULT SERVICE FOLLOW-UP NOTE    INTERVAL HPI:  Reviewed chart and overnight events; patient seen and examined at bedside. The patient was noted to have de-satted and thus was placed back on a nasal cannula. The pt notes that her chest tightness is improving overall. She denies any wheezing.     MEDICATIONS:  Pulmonary:  ALBUTerol    90 MICROgram(s) HFA Inhaler 1 Puff(s) Inhalation every 4 hours  albuterol/ipratropium for Nebulization 3 milliLiter(s) Nebulizer every 6 hours  budesonide  80 MICROgram(s)/formoterol 4.5 MICROgram(s) Inhaler 2 Puff(s) Inhalation two times a day  tiotropium 18 MICROgram(s) Capsule 1 Capsule(s) Inhalation daily    Antimicrobials:    Anticoagulants:    Cardiac:      Allergies    allerigc to contrast dye (Short breath; Angioedema)  IV Contrast (Short breath; Angioedema)    Intolerances        Vital Signs Last 24 Hrs  T(C): 36.6 (21 Jul 2020 08:45), Max: 36.7 (21 Jul 2020 05:36)  T(F): 97.9 (21 Jul 2020 08:45), Max: 98 (21 Jul 2020 05:36)  HR: 95 (21 Jul 2020 08:45) (90 - 100)  BP: 113/75 (21 Jul 2020 08:45) (106/65 - 122/93)  BP(mean): 79 (20 Jul 2020 20:29) (79 - 79)  RR: 20 (21 Jul 2020 08:45) (18 - 20)  SpO2: 95% (21 Jul 2020 08:45) (93% - 98%)        PHYSICAL EXAM:  Constitutional: WDWN  Head: NC/AT  EENT: PERRL, anicteric sclera; oropharynx clear, MMM  Neck: supple, no appreciable JVD  Respiratory: Rales her in the lower lung zones bilaterally, no wheezing,  no rhonchi, Good respiratory effort.   Cardiovascular: +S1/S2, RRR  Gastrointestinal: soft, NT/ND; +BSx4  Extremities: WWP; no edema, clubbing or cyanosis  Vascular: 2+ radial, DP/PT pulses B/L  Neurological: AAOx3; no focal deficits      LABS:        CBC Full  -  ( 21 Jul 2020 06:24 )  WBC Count : 17.35 K/uL  RBC Count : 4.85 M/uL  Hemoglobin : 14.1 g/dL  Hematocrit : 42.6 %  Platelet Count - Automated : 282 K/uL  Mean Cell Volume : 87.8 fl  Mean Cell Hemoglobin : 29.1 pg  Mean Cell Hemoglobin Concentration : 33.1 gm/dL  Auto Neutrophil # : 13.45 K/uL  Auto Lymphocyte # : 2.42 K/uL  Auto Monocyte # : 1.30 K/uL  Auto Eosinophil # : 0.01 K/uL  Auto Basophil # : 0.03 K/uL  Auto Neutrophil % : 77.5 %  Auto Lymphocyte % : 13.9 %  Auto Monocyte % : 7.5 %  Auto Eosinophil % : 0.1 %  Auto Basophil % : 0.2 %    07-21    141  |  108  |  13  ----------------------------<  124<H>  4.8   |  22  |  0.52    Ca    9.2      21 Jul 2020 06:24  Mg     2.3     07-21    TPro  7.7  /  Alb  3.6  /  TBili  0.4  /  DBili  x   /  AST  21  /  ALT  12  /  AlkPhos  95  07-21    PT/INR - ( 20 Jul 2020 00:46 )   PT: 12.0 sec;   INR: 1.00          PTT - ( 20 Jul 2020 00:46 )  PTT:31.9 sec                  RADIOLOGY & ADDITIONAL STUDIES:

## 2020-07-21 NOTE — PROGRESS NOTE ADULT - SUBJECTIVE AND OBJECTIVE BOX
OVERNIGHT EVENTS: SALOMÓN    SUBJECTIVE / INTERVAL HPI: Patient seen and examined at bedside.   Patient reports took off O2 last night and had no issues breathing. 1 bout of coughing overnight but reports this is significantly improved compared to when she was at home.  Feeling better today, still occasional cough, but chest pain is resolved.  Denies fevers and chills.    VITAL SIGNS:  Vital Signs Last 24 Hrs  T(C): 36.6 (21 Jul 2020 08:45), Max: 36.7 (21 Jul 2020 05:36)  T(F): 97.9 (21 Jul 2020 08:45), Max: 98 (21 Jul 2020 05:36)  HR: 95 (21 Jul 2020 08:45) (90 - 100)  BP: 113/75 (21 Jul 2020 08:45) (106/65 - 122/93)  BP(mean): 79 (20 Jul 2020 20:29) (79 - 79)  RR: 20 (21 Jul 2020 08:45) (18 - 20)  SpO2: 95% (21 Jul 2020 08:45) (93% - 98%)    PHYSICAL EXAM:  General: Well developed, well nourished, NAD  HEENT: NC/AT; PERRL, anicteric sclera; MMM  Neck: supple  Cardiovascular: +S1/S2, RRR, no MRG  Respiratory: no wheezing, mild inspiratory crackles heard in mid to base of lungs bilaterally  Gastrointestinal: soft, NT/ND; +BSx4  Extremities: warm and well-perfused; no edema, clubbing or cyanosis  Vascular: 2+ radial, DP pulses B/L  Neurological: AAOx3; CNII-CNXII grossly intact; no focal deficits    MEDICATIONS:  MEDICATIONS  (STANDING):  ALBUTerol    90 MICROgram(s) HFA Inhaler 1 Puff(s) Inhalation every 4 hours  albuterol/ipratropium for Nebulization 3 milliLiter(s) Nebulizer every 6 hours  budesonide  80 MICROgram(s)/formoterol 4.5 MICROgram(s) Inhaler 2 Puff(s) Inhalation two times a day  predniSONE   Tablet 40 milliGRAM(s) Oral every 24 hours  tiotropium 18 MICROgram(s) Capsule 1 Capsule(s) Inhalation daily    MEDICATIONS  (PRN):  ibuprofen  Tablet. 200 milliGRAM(s) Oral every 6 hours PRN Mild Pain (1 - 3), Moderate Pain (4 - 6)      ALLERGIES:  Allergies    allergy to contrast dye (Short breath; Angioedema)  IV Contrast (Short breath; Angioedema)    Intolerances        LABS:                        14.1   17.35 )-----------( 282      ( 21 Jul 2020 06:24 )             42.6     07-21    141  |  108  |  13  ----------------------------<  124<H>  4.8   |  22  |  0.52    Ca    9.2      21 Jul 2020 06:24  Mg     2.3     07-21    TPro  7.7  /  Alb  3.6  /  TBili  0.4  /  DBili  x   /  AST  21  /  ALT  12  /  AlkPhos  95  07-21    PT/INR - ( 20 Jul 2020 00:46 )   PT: 12.0 sec;   INR: 1.00        PTT - ( 20 Jul 2020 00:46 )  PTT:31.9 sec    RADIOLOGY & ADDITIONAL TESTS: Reviewed. BRIEF HOSPITAL COURSE:  61yo female w/ PMH poorly controlled asthma with multiple exacerbations (only requiring ED visits) presented with CP and increased SOB that began day prior to admission while cleaning home with Clorox. Had CP associated with SOB as well as wheezing upon admission into the ED. Wheezes cleared s/p duonebs and solumedrol. CXR unchanged from previous admission, CT chest showing diffuse ground glass opacities bilaterally, attributed to air trapping by both radiology and pulmonology. VBG showing mild resp alkalosis. No accessory muscle usage, initially requiring 3L NC for mid 90s O2 sat. Cardiac etiology for CP was ruled out. Found CP to be costochondritis as pain was reproducible with movement and worsened on inspiration. PE Wells score 1.5 (tachycardia) and D-dimer <150. Troponin negative and EKG showing no ischemic changes. Echo was wnl. Chest pain resolved the day after admission. Pulmonology was consulted and reported patient's asthma exacerbation most likely triggered by clorox usage at home and increased mold in bathrom as reported by patient. Started patient on Symbicort and prednisone 40mg (patient just completed day 3 of a 5 day course of prednisone 20mg prior to admission). Patient currently on duonebs q6h, symbicort, and prednisone 40mg. Started incentive spirometry which greatly improved O2 saturation. Subsequently weaned patient off of NC.     OVERNIGHT EVENTS: SALOMÓN    SUBJECTIVE / INTERVAL HPI: Patient seen and examined at bedside.   Patient reports took off O2 last night and had no issues breathing. 1 bout of coughing overnight but reports this is significantly improved compared to when she was at home.  Feeling better today, still occasional cough, but chest pain is resolved.  Denies fevers and chills.    VITAL SIGNS:  Vital Signs Last 24 Hrs  T(C): 36.6 (21 Jul 2020 08:45), Max: 36.7 (21 Jul 2020 05:36)  T(F): 97.9 (21 Jul 2020 08:45), Max: 98 (21 Jul 2020 05:36)  HR: 95 (21 Jul 2020 08:45) (90 - 100)  BP: 113/75 (21 Jul 2020 08:45) (106/65 - 122/93)  BP(mean): 79 (20 Jul 2020 20:29) (79 - 79)  RR: 20 (21 Jul 2020 08:45) (18 - 20)  SpO2: 95% (21 Jul 2020 08:45) (93% - 98%)    PHYSICAL EXAM:  General: Well developed, well nourished, NAD  HEENT: NC/AT; PERRL, anicteric sclera; MMM  Neck: supple  Cardiovascular: +S1/S2, RRR, no MRG  Respiratory: no wheezing, mild inspiratory crackles heard in mid to base of lungs bilaterally  Gastrointestinal: soft, NT/ND; +BSx4  Extremities: warm and well-perfused; no edema, clubbing or cyanosis  Vascular: 2+ radial, DP pulses B/L  Neurological: AAOx3; CNII-CNXII grossly intact; no focal deficits    MEDICATIONS:  MEDICATIONS  (STANDING):  ALBUTerol    90 MICROgram(s) HFA Inhaler 1 Puff(s) Inhalation every 4 hours  albuterol/ipratropium for Nebulization 3 milliLiter(s) Nebulizer every 6 hours  budesonide  80 MICROgram(s)/formoterol 4.5 MICROgram(s) Inhaler 2 Puff(s) Inhalation two times a day  predniSONE   Tablet 40 milliGRAM(s) Oral every 24 hours  tiotropium 18 MICROgram(s) Capsule 1 Capsule(s) Inhalation daily    MEDICATIONS  (PRN):  ibuprofen  Tablet. 200 milliGRAM(s) Oral every 6 hours PRN Mild Pain (1 - 3), Moderate Pain (4 - 6)      ALLERGIES:  Allergies    allergy to contrast dye (Short breath; Angioedema)  IV Contrast (Short breath; Angioedema)    Intolerances        LABS:                        14.1   17.35 )-----------( 282      ( 21 Jul 2020 06:24 )             42.6     07-21    141  |  108  |  13  ----------------------------<  124<H>  4.8   |  22  |  0.52    Ca    9.2      21 Jul 2020 06:24  Mg     2.3     07-21    TPro  7.7  /  Alb  3.6  /  TBili  0.4  /  DBili  x   /  AST  21  /  ALT  12  /  AlkPhos  95  07-21    PT/INR - ( 20 Jul 2020 00:46 )   PT: 12.0 sec;   INR: 1.00        PTT - ( 20 Jul 2020 00:46 )  PTT:31.9 sec    RADIOLOGY & ADDITIONAL TESTS: Reviewed.

## 2020-07-22 ENCOUNTER — TRANSCRIPTION ENCOUNTER (OUTPATIENT)
Age: 60
End: 2020-07-22

## 2020-07-22 VITALS
HEART RATE: 103 BPM | SYSTOLIC BLOOD PRESSURE: 124 MMHG | TEMPERATURE: 99 F | OXYGEN SATURATION: 92 % | RESPIRATION RATE: 18 BRPM | DIASTOLIC BLOOD PRESSURE: 84 MMHG

## 2020-07-22 LAB
ANA TITR SER: NEGATIVE — SIGNIFICANT CHANGE UP
ANION GAP SERPL CALC-SCNC: 10 MMOL/L — SIGNIFICANT CHANGE UP (ref 5–17)
BUN SERPL-MCNC: 17 MG/DL — SIGNIFICANT CHANGE UP (ref 7–23)
CALCIUM SERPL-MCNC: 9 MG/DL — SIGNIFICANT CHANGE UP (ref 8.4–10.5)
CHLORIDE SERPL-SCNC: 104 MMOL/L — SIGNIFICANT CHANGE UP (ref 96–108)
CO2 SERPL-SCNC: 24 MMOL/L — SIGNIFICANT CHANGE UP (ref 22–31)
CREAT SERPL-MCNC: 0.51 MG/DL — SIGNIFICANT CHANGE UP (ref 0.5–1.3)
GLUCOSE SERPL-MCNC: 104 MG/DL — HIGH (ref 70–99)
HCT VFR BLD CALC: 43.2 % — SIGNIFICANT CHANGE UP (ref 34.5–45)
HGB BLD-MCNC: 14.2 G/DL — SIGNIFICANT CHANGE UP (ref 11.5–15.5)
MAGNESIUM SERPL-MCNC: 2.1 MG/DL — SIGNIFICANT CHANGE UP (ref 1.6–2.6)
MCHC RBC-ENTMCNC: 29.4 PG — SIGNIFICANT CHANGE UP (ref 27–34)
MCHC RBC-ENTMCNC: 32.9 GM/DL — SIGNIFICANT CHANGE UP (ref 32–36)
MCV RBC AUTO: 89.4 FL — SIGNIFICANT CHANGE UP (ref 80–100)
NRBC # BLD: 0 /100 WBCS — SIGNIFICANT CHANGE UP (ref 0–0)
PLATELET # BLD AUTO: 284 K/UL — SIGNIFICANT CHANGE UP (ref 150–400)
POTASSIUM SERPL-MCNC: 4 MMOL/L — SIGNIFICANT CHANGE UP (ref 3.5–5.3)
POTASSIUM SERPL-SCNC: 4 MMOL/L — SIGNIFICANT CHANGE UP (ref 3.5–5.3)
RBC # BLD: 4.83 M/UL — SIGNIFICANT CHANGE UP (ref 3.8–5.2)
RBC # FLD: 13.2 % — SIGNIFICANT CHANGE UP (ref 10.3–14.5)
SODIUM SERPL-SCNC: 138 MMOL/L — SIGNIFICANT CHANGE UP (ref 135–145)
WBC # BLD: 13.33 K/UL — HIGH (ref 3.8–10.5)
WBC # FLD AUTO: 13.33 K/UL — HIGH (ref 3.8–10.5)

## 2020-07-22 PROCEDURE — 80053 COMPREHEN METABOLIC PANEL: CPT

## 2020-07-22 PROCEDURE — 83615 LACTATE (LD) (LDH) ENZYME: CPT

## 2020-07-22 PROCEDURE — 86038 ANTINUCLEAR ANTIBODIES: CPT

## 2020-07-22 PROCEDURE — 87581 M.PNEUMON DNA AMP PROBE: CPT

## 2020-07-22 PROCEDURE — 71045 X-RAY EXAM CHEST 1 VIEW: CPT

## 2020-07-22 PROCEDURE — 87798 DETECT AGENT NOS DNA AMP: CPT

## 2020-07-22 PROCEDURE — 82550 ASSAY OF CK (CPK): CPT

## 2020-07-22 PROCEDURE — 93005 ELECTROCARDIOGRAM TRACING: CPT

## 2020-07-22 PROCEDURE — 93306 TTE W/DOPPLER COMPLETE: CPT

## 2020-07-22 PROCEDURE — 85384 FIBRINOGEN ACTIVITY: CPT

## 2020-07-22 PROCEDURE — 85027 COMPLETE CBC AUTOMATED: CPT

## 2020-07-22 PROCEDURE — 71046 X-RAY EXAM CHEST 2 VIEWS: CPT

## 2020-07-22 PROCEDURE — 84132 ASSAY OF SERUM POTASSIUM: CPT

## 2020-07-22 PROCEDURE — 36415 COLL VENOUS BLD VENIPUNCTURE: CPT

## 2020-07-22 PROCEDURE — 87040 BLOOD CULTURE FOR BACTERIA: CPT

## 2020-07-22 PROCEDURE — 85610 PROTHROMBIN TIME: CPT

## 2020-07-22 PROCEDURE — 85730 THROMBOPLASTIN TIME PARTIAL: CPT

## 2020-07-22 PROCEDURE — 85025 COMPLETE CBC W/AUTO DIFF WBC: CPT

## 2020-07-22 PROCEDURE — 83735 ASSAY OF MAGNESIUM: CPT

## 2020-07-22 PROCEDURE — 82803 BLOOD GASES ANY COMBINATION: CPT

## 2020-07-22 PROCEDURE — 87633 RESP VIRUS 12-25 TARGETS: CPT

## 2020-07-22 PROCEDURE — U0003: CPT

## 2020-07-22 PROCEDURE — 84295 ASSAY OF SERUM SODIUM: CPT

## 2020-07-22 PROCEDURE — 80048 BASIC METABOLIC PNL TOTAL CA: CPT

## 2020-07-22 PROCEDURE — 99232 SBSQ HOSP IP/OBS MODERATE 35: CPT | Mod: GC

## 2020-07-22 PROCEDURE — 82728 ASSAY OF FERRITIN: CPT

## 2020-07-22 PROCEDURE — 71046 X-RAY EXAM CHEST 2 VIEWS: CPT | Mod: 26

## 2020-07-22 PROCEDURE — 71250 CT THORAX DX C-: CPT

## 2020-07-22 PROCEDURE — 82330 ASSAY OF CALCIUM: CPT

## 2020-07-22 PROCEDURE — 87486 CHLMYD PNEUM DNA AMP PROBE: CPT

## 2020-07-22 PROCEDURE — 83880 ASSAY OF NATRIURETIC PEPTIDE: CPT

## 2020-07-22 PROCEDURE — 86140 C-REACTIVE PROTEIN: CPT

## 2020-07-22 PROCEDURE — 99285 EMERGENCY DEPT VISIT HI MDM: CPT | Mod: 25

## 2020-07-22 PROCEDURE — 94640 AIRWAY INHALATION TREATMENT: CPT

## 2020-07-22 PROCEDURE — 96374 THER/PROPH/DIAG INJ IV PUSH: CPT

## 2020-07-22 PROCEDURE — 84484 ASSAY OF TROPONIN QUANT: CPT

## 2020-07-22 PROCEDURE — 83605 ASSAY OF LACTIC ACID: CPT

## 2020-07-22 PROCEDURE — 99239 HOSP IP/OBS DSCHRG MGMT >30: CPT | Mod: GC

## 2020-07-22 PROCEDURE — 85379 FIBRIN DEGRADATION QUANT: CPT

## 2020-07-22 PROCEDURE — 84145 PROCALCITONIN (PCT): CPT

## 2020-07-22 RX ORDER — BUDESONIDE AND FORMOTEROL FUMARATE DIHYDRATE 160; 4.5 UG/1; UG/1
2 AEROSOL RESPIRATORY (INHALATION)
Qty: 1 | Refills: 3
Start: 2020-07-22 | End: 2020-11-18

## 2020-07-22 RX ORDER — FLUTICASONE PROPIONATE 220 MCG
2 AEROSOL WITH ADAPTER (GRAM) INHALATION
Qty: 0 | Refills: 0 | DISCHARGE

## 2020-07-22 RX ORDER — TIOTROPIUM BROMIDE 18 UG/1
1 CAPSULE ORAL; RESPIRATORY (INHALATION)
Qty: 30 | Refills: 3
Start: 2020-07-22 | End: 2020-11-18

## 2020-07-22 RX ORDER — ALBUTEROL 90 UG/1
3 AEROSOL, METERED ORAL
Qty: 0 | Refills: 0 | DISCHARGE

## 2020-07-22 RX ORDER — ALBUTEROL 90 UG/1
3 AEROSOL, METERED ORAL
Qty: 30 | Refills: 0
Start: 2020-07-22

## 2020-07-22 RX ADMIN — Medication 3 MILLILITER(S): at 10:19

## 2020-07-22 RX ADMIN — Medication 40 MILLIGRAM(S): at 13:02

## 2020-07-22 NOTE — PROGRESS NOTE ADULT - PROBLEM SELECTOR PLAN 1
-Likely related to an acute exacerbation of her uncontrolled asthma vs hypersensitivity pneumonitis.  -CT scan was reviewed which showed mosaic attenuation diffusely bilaterally and more prominent in the lower lobes. The differential for this includes air trapping (most likely in this case and 2/2 asthma, also could be due to constrictive bronchiolitis), atypical  and viral infection, CTEPH (no hx of VTE, negative D-Dimer, no PH noted on echo), and hypersensitivity pneumonitis (usually more profound symptoms and hypoxemia acutely and usually upper lobe predominant on CT scan).   -No signs of active bacterial pneumonia based on clinical exam, hx, and imaging. Procalcitonin was negative.   -COVID swab negative, antibodies pending   -Pt is still hypoxemic and while she subjectively feels improved, she is still somewhat symptomatic.   -Echo done and wnl.  Recommend:  -Pt slowly improving, echo note which was normal and no evidence of PH or CHF. Would have expect a more brisk improvement by now however on exam there may be a component of atelectasis. Would strongly encourage Incentive spirometry which may help her atelectasis.   -Please obtain a chest X-Ray tomorrow morning and the pt can be re-assessed clinically to see if any further intervention is needed prior to discharge to further workup her hypoxemia.   -Continue symbicort.  Symbicort, Advair or Breo (whichever is covered by insurance) upon discharge.  -Would also recommend the patient have home albuterol nebulizers available at home for PRN use.   -Would also continue pts home ventolin for PRN use.   -Continue with 40mg prednisone to complete 5 days.   -Please send an RVP and f/u COVID AB  -ambulatory O2 sat done prior to discharge to eval for need for home O2.   -Pt will need to follow closely with pulmonology, please have pt follow up in the fellows clinic with Dr. Brown. She will need full PFTs at that time and more workup for her asthma including an asthma allergy panel and IgE levels once off steroids.
-Likely related to an acute exacerbation of her uncontrolled asthma vs hypersensitivity pneumonitis.  -CT scan was reviewed which showed mosaic attenuation diffusely bilaterally and more prominent in the lower lobes. The differential for this includes air trapping (most likely in this case and 2/2 asthma, also could be due to constrictive bronchiolitis), atypical  and viral infection, CTEPH (no hx of VTE, negative D-Dimer, no PH noted on echo), and hypersensitivity pneumonitis (usually more profound symptoms and hypoxemia acutely and usually upper lobe predominant on CT scan).   -Chest X-Ray reviewed, it was under penetrated but not grossly changed from admission, no focal infiltrates or effusions, difficult to comment on reticular changes given underventilation.   -COVID swab negative  -Hypoxemia improved with incentive spirometry  -Echo done and wnl.    Recommend:  -Continue symbicort.  Symbicort, Advair or Breo (whichever is covered by insurance) upon discharge.  -Would also recommend the patient have home albuterol nebulizers available at home for PRN use.   -Would also continue pts home ventolin for PRN use.   -Continue with 40mg prednisone to complete 5 days.   -ambulatory O2 sat done prior to discharge to eval for need for home O2.   -Pt will need to follow closely with pulmonology, please have pt follow up in the fellows clinic with Dr. Brown. She will need full PFTs at that time and more workup for her asthma including an asthma allergy panel and IgE levels once off steroids.  -Pt may also need repeat imaging as an outpatient to re-eval the mosaic attenuation.
Pt presenting with CP a/w SOB as well as wheezing upon admission into the ED. Wheezes cleared s/p duonebs and solumedrol. CXR unchanged from previous admission, CT chest showing diffuse GGO B/L. VBG showing mild resp alkalosis. No accessory muscle usage, requiring 3L NC for mid 90s O2 sat.   - most likely triggered by usage of Clorox in home and increased mold in bathroom as noted by patient  - Also ILD on differential but seen by pulm consult who said more likely to be air-trapping on CT chest based on patient's presentation  - previous concern for COVID given CT findings, but COVID reported neg today  - sent RVP to r/o other viral infectious etiologies, f/u  - much less likely to be pneumonia as no SIRS criteria besides elevated HR and acute flare of symptoms over past day with a likely trigger  - echo wnl today (pulm had some concern for PH or CHF and wanted to check)  - WBC elev to 17.35 today most likely margination 2/2 increased steroid dose  - continue prednisone 40mg (5 day course), symbicort, Duonebs q6h   - incentive spirometry  - O2 sat off NC today was 88-90% with ambulation, will check again tomorrow  - per pulm recs, will f/u in outpatient for PFTs and asthma panel (appointment has been scheduled for 8/19 @1:30pm)
Pt presenting with CP a/w SOB as well as wheezing upon admission into the ED. Wheezes cleared s/p duonebs and solumedrol. CXR unchanged from previous admission, CT chest showing diffuse GGO B/L. VBG showing mild resp alkalosis. No accessory muscle usage, requiring 3L NC for mid 90s O2 sat.   - most likely triggered by usage of Clorox in home and increased mold in bathroom as noted by patient  - Also ILD on differential but seen by pulm consult who said more likely to be air-trapping on CT chest based on patient's presentation  - previous concern for COVID given CT findings, but COVID reported neg today  - sent RVP to r/o other viral infectious etiologies, f/u  - much less likely to be pneumonia as no SIRS criteria besides elevated HR and acute flare of symptoms over past day with a likely trigger  - started on prednisone 40mg  - symbicort (instead of home flovent)  - Duonebs q6h   - per pulm recs, will f/u in outpatient for PFTs and asthma panel (appointment has been scheduled for 8/19 @1:30pm)

## 2020-07-22 NOTE — DISCHARGE NOTE NURSING/CASE MANAGEMENT/SOCIAL WORK - PATIENT PORTAL LINK FT
You can access the FollowMyHealth Patient Portal offered by [facility] by registering at the following website: [email or web address]. By joining [facility]’s FollowMyHealth portal, you will also be able to view your health information using other applications (apps) compatible with our system.

## 2020-07-22 NOTE — PROGRESS NOTE ADULT - SUBJECTIVE AND OBJECTIVE BOX
PULMONARY CONSULT SERVICE FOLLOW-UP NOTE    INTERVAL HPI:  Reviewed chart and overnight events; patient seen and examined at bedside. The patient notes that overall her breathing has improved since admission. She is not coughing any more and denies wheezing. She has been using the incentive spirometer.     MEDICATIONS:  Pulmonary:  ALBUTerol    90 MICROgram(s) HFA Inhaler 1 Puff(s) Inhalation every 4 hours  albuterol/ipratropium for Nebulization 3 milliLiter(s) Nebulizer every 6 hours  budesonide  80 MICROgram(s)/formoterol 4.5 MICROgram(s) Inhaler 2 Puff(s) Inhalation two times a day  tiotropium 18 MICROgram(s) Capsule 1 Capsule(s) Inhalation daily    Antimicrobials:    Anticoagulants:    Cardiac:      Allergies    allerigc to contrast dye (Short breath; Angioedema)  IV Contrast (Short breath; Angioedema)    Intolerances        Vital Signs Last 24 Hrs  T(C): 36.7 (22 Jul 2020 09:44), Max: 36.7 (22 Jul 2020 09:44)  T(F): 98 (22 Jul 2020 09:44), Max: 98 (22 Jul 2020 09:44)  HR: 93 (22 Jul 2020 09:44) (84 - 102)  BP: 116/80 (22 Jul 2020 09:44) (116/80 - 149/96)  BP(mean): --  RR: 16 (22 Jul 2020 09:44) (16 - 19)  SpO2: 95% (22 Jul 2020 09:44) (90% - 95%)        PHYSICAL EXAM:  Constitutional: WDWN  Head: NC/AT  EENT: PERRL, anicteric sclera; oropharynx clear, MMM  Neck: supple, no appreciable JVD  Respiratory: Clear to auscultation bilaterally, no wheezing,  no rhonchi, Good respiratory effort.   Cardiovascular: +S1/S2, RRR  Gastrointestinal: soft, NT/ND; +BSx4  Extremities: WWP; no edema, clubbing or cyanosis  Vascular: 2+ radial, DP/PT pulses B/L  Neurological: AAOx3; no focal deficits        LABS:        CBC Full  -  ( 22 Jul 2020 06:57 )  WBC Count : 13.33 K/uL  RBC Count : 4.83 M/uL  Hemoglobin : 14.2 g/dL  Hematocrit : 43.2 %  Platelet Count - Automated : 284 K/uL  Mean Cell Volume : 89.4 fl  Mean Cell Hemoglobin : 29.4 pg  Mean Cell Hemoglobin Concentration : 32.9 gm/dL  Auto Neutrophil # : x  Auto Lymphocyte # : x  Auto Monocyte # : x  Auto Eosinophil # : x  Auto Basophil # : x  Auto Neutrophil % : x  Auto Lymphocyte % : x  Auto Monocyte % : x  Auto Eosinophil % : x  Auto Basophil % : x    07-22    138  |  104  |  17  ----------------------------<  104<H>  4.0   |  24  |  0.51    Ca    9.0      22 Jul 2020 06:57  Mg     2.1     07-22    TPro  7.7  /  Alb  3.6  /  TBili  0.4  /  DBili  x   /  AST  21  /  ALT  12  /  AlkPhos  95  07-21                      RADIOLOGY & ADDITIONAL STUDIES:  Chest X-Ray

## 2020-07-22 NOTE — PROGRESS NOTE ADULT - PROBLEM SELECTOR PLAN 2
-As noted above.
-As noted above.
Pt presenting with worsening shortness of breath and asthma related symptoms since March, symptoms everyday, daily albuterol use and decrease in normal activity. Over the past two months the pt has also had daily night time awakenings.  - pulm following, treatment of exacerbation as above.
Pt presenting with worsening shortness of breath and asthma related symptoms since March, symptoms everyday, daily albuterol use and decrease in normal activity. Over the past two months the pt has also had daily night time awakenings.  - Consult pulm for asthma control as above   - Treatment of exacerbation as above.

## 2020-07-22 NOTE — PROGRESS NOTE ADULT - ATTENDING COMMENTS
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  will consult pulmonology for further recommendations, appreciate assistance    also, patient only  on GAMA and iso of recurrent exacerbations - and recent prednisone taper. will likely need better baseline treatment for asthma control.
was discharged before I was seen
patient with leukocytosis - iso of starting steroids yesterday  will f/u echocardiogram  weaning of supplemental O2      possible d/c tomorrow

## 2020-07-22 NOTE — DISCHARGE NOTE NURSING/CASE MANAGEMENT/SOCIAL WORK - NSDCFUADDAPPT_GEN_ALL_CORE_FT
Please contact your PCP and make a follow-up appointment with your primary care provider.     Please also follow-up with pulmonology (lung doctor) Dr. Ferdinand Teixeira scheduled 8/19/20 at 1:30 pm, address listed above.

## 2020-07-22 NOTE — PROGRESS NOTE ADULT - ASSESSMENT
61yo female w/ PMH poorly controlled asthma with multiple exacerbations (only requiring ED visits) presents with CP and increased SOB that began yesterday while cleaning home with Clorox. Likely cleaning agent-triggered asthma exacerbation in setting of severe persistent poorly-controlled asthma.
This is a 59 y/o woman with a hx of uncontrolled asthma with frequent exacerbations in the past several months who presents with cough, SOB, and chest tightness after cleaning with Chlorox.
This is a 61 y/o woman with a hx of uncontrolled asthma with frequent exacerbations in the past several months who presents with cough, SOB, and chest tightness after cleaning with Chlorox.
61yo female w/ PMH poorly controlled asthma with multiple exacerbations (only requiring ED visits) presents with CP and increased SOB that began yesterday while cleaning home with Clorox. Likely cleaning agent-triggered asthma exacerbation in setting of severe persistent poorly-controlled asthma.

## 2020-07-22 NOTE — PROGRESS NOTE ADULT - PROBLEM SELECTOR PLAN 3
-As described above.
-As described above.
Pt presenting with CP of about one day a/w SOB. Pain is reproducible with movement and worsened on inspiration. PE Wells score 1.5 (tachycardia) and D-dimer <150. Troponin negative and EKG showing no ischemic changes.   - ruled out cardiac etiology, also no history of heart conditions  - Pain most likely 2/2 asthma exacerbation with cough and also costochondritis as CP is reportedly much improved after breathing treatments  - chest pain now resolved  - Tx for asthma exacerbation as above
Pt presenting with CP of about one day a/w SOB. Pain is reproducible with movement and worsened on inspiration. PE Wells score 1.5 (tachycardia) and D-dimer <150. Troponin negative and EKG showing no ischemic changes.   - ruled out cardiac etiology, also no history of heart conditions  - Pain most likely 2/2 asthma exacerbation with cough and also costochondritis as CP is reportedly much improved after breathing treatments  - pain on palpation midsternally noted on exam, consistent with costochondritis  - ibuprofen 200mg q6h prn pain  - Tx for asthma exacerbation as above

## 2020-07-25 LAB
CULTURE RESULTS: SIGNIFICANT CHANGE UP
CULTURE RESULTS: SIGNIFICANT CHANGE UP
SPECIMEN SOURCE: SIGNIFICANT CHANGE UP
SPECIMEN SOURCE: SIGNIFICANT CHANGE UP

## 2020-07-27 DIAGNOSIS — J45.51 SEVERE PERSISTENT ASTHMA WITH (ACUTE) EXACERBATION: ICD-10-CM

## 2020-07-27 DIAGNOSIS — Z91.041 RADIOGRAPHIC DYE ALLERGY STATUS: ICD-10-CM

## 2020-07-27 DIAGNOSIS — R21 RASH AND OTHER NONSPECIFIC SKIN ERUPTION: ICD-10-CM

## 2020-07-27 DIAGNOSIS — E87.3 ALKALOSIS: ICD-10-CM

## 2020-07-27 DIAGNOSIS — R63.8 OTHER SYMPTOMS AND SIGNS CONCERNING FOOD AND FLUID INTAKE: ICD-10-CM

## 2020-07-27 DIAGNOSIS — M94.0 CHONDROCOSTAL JUNCTION SYNDROME [TIETZE]: ICD-10-CM

## 2020-07-27 DIAGNOSIS — R07.9 CHEST PAIN, UNSPECIFIED: ICD-10-CM

## 2020-07-27 DIAGNOSIS — J96.01 ACUTE RESPIRATORY FAILURE WITH HYPOXIA: ICD-10-CM

## 2020-07-27 DIAGNOSIS — D21.9 BENIGN NEOPLASM OF CONNECTIVE AND OTHER SOFT TISSUE, UNSPECIFIED: ICD-10-CM

## 2020-08-17 ENCOUNTER — LABORATORY RESULT (OUTPATIENT)
Age: 60
End: 2020-08-17

## 2020-08-19 ENCOUNTER — APPOINTMENT (OUTPATIENT)
Dept: PULMONOLOGY | Facility: CLINIC | Age: 60
End: 2020-08-19
Payer: COMMERCIAL

## 2020-08-19 ENCOUNTER — OUTPATIENT (OUTPATIENT)
Dept: OUTPATIENT SERVICES | Facility: HOSPITAL | Age: 60
LOS: 1 days | End: 2020-08-19
Payer: COMMERCIAL

## 2020-08-19 VITALS
DIASTOLIC BLOOD PRESSURE: 78 MMHG | SYSTOLIC BLOOD PRESSURE: 108 MMHG | TEMPERATURE: 97.5 F | WEIGHT: 165 LBS | HEIGHT: 65 IN | BODY MASS INDEX: 27.49 KG/M2 | HEART RATE: 114 BPM | OXYGEN SATURATION: 93 %

## 2020-08-19 DIAGNOSIS — Z87.891 PERSONAL HISTORY OF NICOTINE DEPENDENCE: ICD-10-CM

## 2020-08-19 DIAGNOSIS — R06.00 DYSPNEA, UNSPECIFIED: ICD-10-CM

## 2020-08-19 DIAGNOSIS — Z90.49 ACQUIRED ABSENCE OF OTHER SPECIFIED PARTS OF DIGESTIVE TRACT: Chronic | ICD-10-CM

## 2020-08-19 DIAGNOSIS — Z98.89 OTHER SPECIFIED POSTPROCEDURAL STATES: Chronic | ICD-10-CM

## 2020-08-19 PROCEDURE — 99214 OFFICE O/P EST MOD 30 MIN: CPT | Mod: 25,GC

## 2020-08-19 PROCEDURE — 94060 EVALUATION OF WHEEZING: CPT

## 2020-08-19 PROCEDURE — 95012 NITRIC OXIDE EXP GAS DETER: CPT

## 2020-08-19 PROCEDURE — 94729 DIFFUSING CAPACITY: CPT

## 2020-08-19 PROCEDURE — 94760 N-INVAS EAR/PLS OXIMETRY 1: CPT

## 2020-08-19 PROCEDURE — 94726 PLETHYSMOGRAPHY LUNG VOLUMES: CPT

## 2020-08-19 PROCEDURE — 94726 PLETHYSMOGRAPHY LUNG VOLUMES: CPT | Mod: 26

## 2020-08-19 PROCEDURE — 94010 BREATHING CAPACITY TEST: CPT | Mod: 26

## 2020-08-19 PROCEDURE — 94729 DIFFUSING CAPACITY: CPT | Mod: 26

## 2020-08-19 NOTE — END OF VISIT
[] : Fellow conducted a detailed discussion... I had a detailed discussion with the patient and/or guardian regarding the historical points, exam findings, and any diagnostic results supporting the discharge/admit diagnosis.

## 2020-08-21 NOTE — ED PROVIDER NOTE - NSTIMEPROVIDERCAREINITIATE_GEN_ER
Patients wife informed of results and recommendations, she will have patient call us back.    11-Mar-2019 15:33

## 2020-08-21 NOTE — ASSESSMENT
[FreeTextEntry1] : Data reviewed: \par \par CT Chest [facility] 7/2020 personally reviewed : diffuse mosaic attenuation\par \par [place] 8/19/20: severe restriction\par \par Impression: dyspnea, uncontrolled concern for HP versus bronchial tree disease \par \par Plan:\par - severe restriction and diffuse mosaic attenuation not consistent with reported hx of asthma. no formal diagnosis of asthma in the past. concern for possible exposure hx of mold and HP versus bronchial disease. will send to the hospital for evaluation w/ full PFT for further w/u. Has some minimal improvement with steroids but worsens off steroids. \par \par --\par Attending Addendum\par \par Agree w above. Remote minimal smoker with repeated exacerbations of lung disease, just hospitalized at [facility] and given steroids and bronchodilators with some improvement, but remains very dyspneic today. Exam notable for fine rales throughout. [place] is restricted. CT w mosaic attenuation. Asthma? Small airways disease? HP? Will send to hospital lab now for full lung function testing. Of note she says she has never been treated with asthma controllers, but I do see Symbicort on her discharge summary. Inpatient eos were normal.\par --\par PFT 8/19/2 [facility]: complex restrictive abnormality, severe, FEV1 48%, TLC 69%, DLCO 48%, no sig BD response\par \par Fellow Addendum 8/20/20: Reviewed PFT. PFTs non specific with multiple potential etiologies with asthma being the most common. Discussed results of PFT with patient. We will plan to initiate treatment for severe uncontrolled asthma with LABA/ICS (Breo) and will assess its impact on her symptoms in 1 month. If her symptoms improve will obtain repeat PFT on therapy and tx as asthmatic. If no symptomatic improvement will need bronchoscopy for possible HP and potential tissue biopsy for constrictive bronchiolitis.

## 2020-08-21 NOTE — HISTORY OF PRESENT ILLNESS
No acute exacerbation  RT protocols   [TextBox_4] : 8/19/2020 Wilder: 61 y/o female ?asthma who presents for f/u. She has no formal diagnosis of asthma. She was never asthmatic as a child and no family members with asthma. Both of her adult children suffer from asthma. Hospitalized in July for hypoxic respiratory failure. Has visited the emergency room approximately 10 times in the last year for respiratory failure. Improved each time in the ED with nebulizers and steroids. No other medical problems. No hx of radiation or chemotherapy. No medications. 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 \par

## 2020-08-21 NOTE — REVIEW OF SYSTEMS
[Recent Wt Loss (___ Lbs)] : ~T recent [unfilled] lb weight loss [Rash] : rash [Negative] : Musculoskeletal [Fever] : no fever [Chills] : no chills [TextBox_30] : per hpi

## 2020-08-21 NOTE — PHYSICAL EXAM
[No Acute Distress] : no acute distress [Normal Oropharynx] : normal oropharynx [Normal Appearance] : normal appearance [No Neck Mass] : no neck mass [Normal Rate/Rhythm] : normal rate/rhythm [Normal S1, S2] : normal s1, s2 [No Murmurs] : no murmurs [Benign] : benign [No Abnormalities] : no abnormalities [No Focal Deficits] : no focal deficits [No Edema] : no edema [Oriented x3] : oriented x3 [Normal Affect] : normal affect [TextBox_68] : diffuse inspiratory crackles

## 2020-09-23 ENCOUNTER — INPATIENT (INPATIENT)
Facility: HOSPITAL | Age: 60
LOS: 4 days | Discharge: HOME | DRG: 166 | End: 2020-09-28
Attending: HOSPITALIST | Admitting: HOSPITALIST
Payer: COMMERCIAL

## 2020-09-23 VITALS
DIASTOLIC BLOOD PRESSURE: 90 MMHG | HEIGHT: 64 IN | SYSTOLIC BLOOD PRESSURE: 151 MMHG | OXYGEN SATURATION: 84 % | RESPIRATION RATE: 20 BRPM | WEIGHT: 164.91 LBS | TEMPERATURE: 99 F | HEART RATE: 115 BPM

## 2020-09-23 DIAGNOSIS — R65.10 SYSTEMIC INFLAMMATORY RESPONSE SYNDROME (SIRS) OF NON-INFECTIOUS ORIGIN WITHOUT ACUTE ORGAN DYSFUNCTION: ICD-10-CM

## 2020-09-23 DIAGNOSIS — Z90.49 ACQUIRED ABSENCE OF OTHER SPECIFIED PARTS OF DIGESTIVE TRACT: Chronic | ICD-10-CM

## 2020-09-23 DIAGNOSIS — J45.909 UNSPECIFIED ASTHMA, UNCOMPLICATED: ICD-10-CM

## 2020-09-23 DIAGNOSIS — R63.8 OTHER SYMPTOMS AND SIGNS CONCERNING FOOD AND FLUID INTAKE: ICD-10-CM

## 2020-09-23 DIAGNOSIS — Z98.89 OTHER SPECIFIED POSTPROCEDURAL STATES: Chronic | ICD-10-CM

## 2020-09-23 DIAGNOSIS — R06.02 SHORTNESS OF BREATH: ICD-10-CM

## 2020-09-23 DIAGNOSIS — N63.0 UNSPECIFIED LUMP IN UNSPECIFIED BREAST: ICD-10-CM

## 2020-09-23 LAB
ALBUMIN SERPL ELPH-MCNC: 3.8 G/DL — SIGNIFICANT CHANGE UP (ref 3.3–5)
ALP SERPL-CCNC: 105 U/L — SIGNIFICANT CHANGE UP (ref 40–120)
ALT FLD-CCNC: 9 U/L — LOW (ref 10–45)
ANION GAP SERPL CALC-SCNC: 13 MMOL/L — SIGNIFICANT CHANGE UP (ref 5–17)
APPEARANCE UR: CLEAR — SIGNIFICANT CHANGE UP
APTT BLD: 34 SEC — SIGNIFICANT CHANGE UP (ref 27.5–35.5)
AST SERPL-CCNC: 12 U/L — SIGNIFICANT CHANGE UP (ref 10–40)
BASOPHILS # BLD AUTO: 0.08 K/UL — SIGNIFICANT CHANGE UP (ref 0–0.2)
BASOPHILS NFR BLD AUTO: 0.6 % — SIGNIFICANT CHANGE UP (ref 0–2)
BILIRUB SERPL-MCNC: 1 MG/DL — SIGNIFICANT CHANGE UP (ref 0.2–1.2)
BILIRUB UR-MCNC: NEGATIVE — SIGNIFICANT CHANGE UP
BUN SERPL-MCNC: 10 MG/DL — SIGNIFICANT CHANGE UP (ref 7–23)
CALCIUM SERPL-MCNC: 9.4 MG/DL — SIGNIFICANT CHANGE UP (ref 8.4–10.5)
CHLORIDE SERPL-SCNC: 102 MMOL/L — SIGNIFICANT CHANGE UP (ref 96–108)
CO2 SERPL-SCNC: 24 MMOL/L — SIGNIFICANT CHANGE UP (ref 22–31)
COLOR SPEC: YELLOW — SIGNIFICANT CHANGE UP
CREAT SERPL-MCNC: 0.61 MG/DL — SIGNIFICANT CHANGE UP (ref 0.5–1.3)
D DIMER BLD IA.RAPID-MCNC: 225 NG/ML DDU — SIGNIFICANT CHANGE UP
DIFF PNL FLD: NEGATIVE — SIGNIFICANT CHANGE UP
EOSINOPHIL # BLD AUTO: 0.07 K/UL — SIGNIFICANT CHANGE UP (ref 0–0.5)
EOSINOPHIL NFR BLD AUTO: 0.5 % — SIGNIFICANT CHANGE UP (ref 0–6)
GLUCOSE SERPL-MCNC: 114 MG/DL — HIGH (ref 70–99)
GLUCOSE UR QL: NEGATIVE — SIGNIFICANT CHANGE UP
HCT VFR BLD CALC: 46.6 % — HIGH (ref 34.5–45)
HGB BLD-MCNC: 15 G/DL — SIGNIFICANT CHANGE UP (ref 11.5–15.5)
IMM GRANULOCYTES NFR BLD AUTO: 0.2 % — SIGNIFICANT CHANGE UP (ref 0–1.5)
INR BLD: 1.12 — SIGNIFICANT CHANGE UP (ref 0.88–1.16)
KETONES UR-MCNC: NEGATIVE — SIGNIFICANT CHANGE UP
LACTATE SERPL-SCNC: 1.9 MMOL/L — SIGNIFICANT CHANGE UP (ref 0.5–2)
LEUKOCYTE ESTERASE UR-ACNC: NEGATIVE — SIGNIFICANT CHANGE UP
LYMPHOCYTES # BLD AUTO: 14.5 % — SIGNIFICANT CHANGE UP (ref 13–44)
LYMPHOCYTES # BLD AUTO: 2.08 K/UL — SIGNIFICANT CHANGE UP (ref 1–3.3)
MCHC RBC-ENTMCNC: 28.6 PG — SIGNIFICANT CHANGE UP (ref 27–34)
MCHC RBC-ENTMCNC: 32.2 GM/DL — SIGNIFICANT CHANGE UP (ref 32–36)
MCV RBC AUTO: 88.9 FL — SIGNIFICANT CHANGE UP (ref 80–100)
MONOCYTES # BLD AUTO: 0.79 K/UL — SIGNIFICANT CHANGE UP (ref 0–0.9)
MONOCYTES NFR BLD AUTO: 5.5 % — SIGNIFICANT CHANGE UP (ref 2–14)
NEUTROPHILS # BLD AUTO: 11.26 K/UL — HIGH (ref 1.8–7.4)
NEUTROPHILS NFR BLD AUTO: 78.7 % — HIGH (ref 43–77)
NITRITE UR-MCNC: NEGATIVE — SIGNIFICANT CHANGE UP
NRBC # BLD: 0 /100 WBCS — SIGNIFICANT CHANGE UP (ref 0–0)
NT-PROBNP SERPL-SCNC: 27 PG/ML — SIGNIFICANT CHANGE UP (ref 0–300)
PH UR: 6 — SIGNIFICANT CHANGE UP (ref 5–8)
PLATELET # BLD AUTO: 316 K/UL — SIGNIFICANT CHANGE UP (ref 150–400)
POTASSIUM SERPL-MCNC: 3.9 MMOL/L — SIGNIFICANT CHANGE UP (ref 3.5–5.3)
POTASSIUM SERPL-SCNC: 3.9 MMOL/L — SIGNIFICANT CHANGE UP (ref 3.5–5.3)
PROT SERPL-MCNC: 8.7 G/DL — HIGH (ref 6–8.3)
PROT UR-MCNC: NEGATIVE MG/DL — SIGNIFICANT CHANGE UP
PROTHROM AB SERPL-ACNC: 13.4 SEC — SIGNIFICANT CHANGE UP (ref 10.6–13.6)
RAPID RVP RESULT: SIGNIFICANT CHANGE UP
RBC # BLD: 5.24 M/UL — HIGH (ref 3.8–5.2)
RBC # FLD: 13.6 % — SIGNIFICANT CHANGE UP (ref 10.3–14.5)
SARS-COV-2 RNA SPEC QL NAA+PROBE: SIGNIFICANT CHANGE UP
SODIUM SERPL-SCNC: 139 MMOL/L — SIGNIFICANT CHANGE UP (ref 135–145)
SP GR SPEC: >=1.03 — SIGNIFICANT CHANGE UP (ref 1–1.03)
TROPONIN T SERPL-MCNC: <0.01 NG/ML — SIGNIFICANT CHANGE UP (ref 0–0.01)
UROBILINOGEN FLD QL: 0.2 E.U./DL — SIGNIFICANT CHANGE UP
WBC # BLD: 14.31 K/UL — HIGH (ref 3.8–10.5)
WBC # FLD AUTO: 14.31 K/UL — HIGH (ref 3.8–10.5)

## 2020-09-23 PROCEDURE — 93010 ELECTROCARDIOGRAM REPORT: CPT

## 2020-09-23 PROCEDURE — 71250 CT THORAX DX C-: CPT | Mod: 26

## 2020-09-23 PROCEDURE — 99285 EMERGENCY DEPT VISIT HI MDM: CPT

## 2020-09-23 PROCEDURE — 71045 X-RAY EXAM CHEST 1 VIEW: CPT | Mod: 26

## 2020-09-23 RX ORDER — IPRATROPIUM/ALBUTEROL SULFATE 18-103MCG
3 AEROSOL WITH ADAPTER (GRAM) INHALATION ONCE
Refills: 0 | Status: COMPLETED | OUTPATIENT
Start: 2020-09-23 | End: 2020-09-23

## 2020-09-23 RX ORDER — SODIUM CHLORIDE 9 MG/ML
1000 INJECTION INTRAMUSCULAR; INTRAVENOUS; SUBCUTANEOUS ONCE
Refills: 0 | Status: COMPLETED | OUTPATIENT
Start: 2020-09-23 | End: 2020-09-23

## 2020-09-23 RX ORDER — METOCLOPRAMIDE HCL 10 MG
10 TABLET ORAL ONCE
Refills: 0 | Status: COMPLETED | OUTPATIENT
Start: 2020-09-23 | End: 2020-09-23

## 2020-09-23 RX ORDER — ACETAMINOPHEN 500 MG
650 TABLET ORAL ONCE
Refills: 0 | Status: COMPLETED | OUTPATIENT
Start: 2020-09-23 | End: 2020-09-23

## 2020-09-23 RX ORDER — ALBUTEROL 90 UG/1
1 AEROSOL, METERED ORAL EVERY 4 HOURS
Refills: 0 | Status: DISCONTINUED | OUTPATIENT
Start: 2020-09-23 | End: 2020-09-28

## 2020-09-23 RX ORDER — TIOTROPIUM BROMIDE 18 UG/1
1 CAPSULE ORAL; RESPIRATORY (INHALATION) DAILY
Refills: 0 | Status: DISCONTINUED | OUTPATIENT
Start: 2020-09-23 | End: 2020-09-24

## 2020-09-23 RX ORDER — IPRATROPIUM/ALBUTEROL SULFATE 18-103MCG
3 AEROSOL WITH ADAPTER (GRAM) INHALATION EVERY 6 HOURS
Refills: 0 | Status: DISCONTINUED | OUTPATIENT
Start: 2020-09-23 | End: 2020-09-28

## 2020-09-23 RX ORDER — ALBUTEROL 90 UG/1
2.5 AEROSOL, METERED ORAL ONCE
Refills: 0 | Status: COMPLETED | OUTPATIENT
Start: 2020-09-23 | End: 2020-09-23

## 2020-09-23 RX ORDER — INFLUENZA VIRUS VACCINE 15; 15; 15; 15 UG/.5ML; UG/.5ML; UG/.5ML; UG/.5ML
0.5 SUSPENSION INTRAMUSCULAR ONCE
Refills: 0 | Status: COMPLETED | OUTPATIENT
Start: 2020-09-23 | End: 2020-09-23

## 2020-09-23 RX ADMIN — Medication 3 MILLILITER(S): at 12:47

## 2020-09-23 RX ADMIN — Medication 3 MILLILITER(S): at 12:58

## 2020-09-23 RX ADMIN — Medication 125 MILLIGRAM(S): at 12:35

## 2020-09-23 RX ADMIN — ALBUTEROL 2.5 MILLIGRAM(S): 90 AEROSOL, METERED ORAL at 17:20

## 2020-09-23 RX ADMIN — Medication 10 MILLIGRAM(S): at 12:50

## 2020-09-23 RX ADMIN — Medication 650 MILLIGRAM(S): at 12:50

## 2020-09-23 RX ADMIN — SODIUM CHLORIDE 1000 MILLILITER(S): 9 INJECTION INTRAMUSCULAR; INTRAVENOUS; SUBCUTANEOUS at 13:46

## 2020-09-23 RX ADMIN — Medication 650 MILLIGRAM(S): at 13:20

## 2020-09-23 RX ADMIN — Medication 3 MILLILITER(S): at 22:32

## 2020-09-23 RX ADMIN — SODIUM CHLORIDE 1000 MILLILITER(S): 9 INJECTION INTRAMUSCULAR; INTRAVENOUS; SUBCUTANEOUS at 12:46

## 2020-09-23 NOTE — ED PROVIDER NOTE - OBJECTIVE STATEMENT
60F PMH asthma (frequent exacerbations, no intubations, last admitted [facility] ~2mos ago), p/w SOB/CP. Pt w/ 2w of slowly worsening SOB. Also dry cough. Also b/l CP, worse w/ coughing. Finally came to ED 2/2 slowly worsening symptoms. Also subjective fever today, occasional nausea. Also HA, gradual onset, intermittent x2w. Has not had COVID yet, no known sick contacts. Denies associated VD, lightheaded, diaphoresis, palpitations, rhinorrhea, black/bloody stool, LE pain/swelling, focal weakness/numbness, recent travel/immobilization, abd pain, urinary complaints, f/c.  Unknown name of pulmonologist.   [facility] admitted in July for likely RAD/penumonitis. Initial sats high 80s/90 at that time. CT performed. Echo grossly wnl.

## 2020-09-23 NOTE — ED ADULT NURSE NOTE - OBJECTIVE STATEMENT
59 yo F pmhx asthma, never been intubated but admitted at [facility] 2 months ago, presents to ED co worsening SOB, headache, dry cough x2weeks. Pt is AOx3, speaking in clear and full sentences, chest rise equal and symmetrical with no tripod positioning noted. Pt placed on 3L Nc upon arrival and sating at 94%. Pt reports feeling warm this AM, no known fevers recently. Denies chills, N/V/D, blurred vision.

## 2020-09-23 NOTE — H&P ADULT - NUTRITIONAL ASSESSMENT
61 yo F w/ a PMHx of asthma (no hx of intubations, multiple hospitalizations, last admitted in July 2020) p/w SOB, chest tightness, and dry cough x2wks. Pt states symptoms occur at home while at rest and does not endorse any triggers. Cough is nonproductive. States symptoms are worse than her previous asthma exacerbations and her albuterol provides relief for only a short period of time. Denies recent travel. Of note, pt endorses 2wks diarrhea. Pt also mentioned her home bathroom contains black mold along the walls and ceiling. No known sick contacts. Follows w/ Dr. Brown outpatient w/ next appointment on 9/30. Patient [facility] for further management of care.

## 2020-09-23 NOTE — ED ADULT NURSE NOTE - NSIMPLEMENTINTERV_GEN_ALL_ED
Implemented All Universal Safety Interventions:  [place] to call system. Call bell, personal items and telephone within reach. Instruct patient to call for assistance. Room bathroom lighting operational. Non-slip footwear when patient is off stretcher. Physically safe environment: no spills, clutter or unnecessary equipment. Stretcher in lowest position, wheels locked, appropriate side rails in place.

## 2020-09-23 NOTE — H&P ADULT - PROBLEM SELECTOR PLAN 3
Pt has hx of asthma w/ multiple hospitalizations (last admitted July 2020). No hx of intubations. On home albuterol and breo  -s/p duonebs, solumedrol in ED  -c/w albuterol  -c/w breo

## 2020-09-23 NOTE — H&P ADULT - HISTORY OF PRESENT ILLNESS
61 yo F w/ a PMHx of asthma (no hx of intubations, multiple hospitalizations, last admitted in July 2020) p/w SOB, chest tightness, and dry cough x2wks. Pt states symptoms occur at home while at rest and does not endorse any triggers. Cough is nonproductive. States symptoms are worse than her previous asthma exacerbations and her albuterol provides relief for only a short period of time. Denies recent travel. Of note, pt mentioned her home bathroom has black mold along the walls and ceiling. States she has had diarrhea for the past 2 weeks and her  has also been coughing. No known sick contacts. Follows w/ Dr. Brown outpatient w/ next appointment on 9/30. Patient admitted to [facility] for further management of care.    ED Course:  T 98, H 115, /90, R 20, SpO2 84% on RA  WBC 14.3, Protein Gap 4.9  UA negative, COVID PCR negative  CT Chest: diffuse mosaic attenuation bilaterally with slight upper lung predominance  CXR: b/l interstitial opacities 61 yo F w/ a PMHx of asthma (no hx of intubations, multiple hospitalizations, last admitted in July 2020) p/w SOB, chest tightness, and nonproductive cough x2wks. Pt states symptoms occur at home while at rest and does not endorse any triggers. Cough is nonproductive. States symptoms are worse than her previous asthma exacerbations and her albuterol provides relief for only a short period of time. Denies recent travel. Of note, pt endorses 2wks diarrhea. Pt also mentioned her home bathroom contains black mold along the walls and ceiling. No known sick contacts. Follows w/ Dr. Brown outpatient w/ next appointment on 9/30. Patient admitted to [facility] for further management of care.    ED Course:  T 98, H 115, /90, R 20, SpO2 84% on RA (improved to 94% w/ 3L O2)  WBC 14.3, D-Dimer 225, Protein Gap 4.9, Trop <0.01  (-)UA, (-)COVID-PCR  CT Chest: diffuse mosaic attenuation bilaterally with slight upper lung predominance  CXR: b/l interstitial opacities  Duoneb 3ml neb x2, solumedrol 125mg IV x1, 1L NS, Acetaminophen 650mg x1

## 2020-09-23 NOTE — H&P ADULT - PROBLEM SELECTOR PLAN 1
Pt met 2/4 SIRS criterial on admission (H 115, WBC 14.3). UA negative. CT chest demonstrates diffuse mosaic attenuation bilaterally with slight upper lung predominance. Possibly 2/2  atypical PNA vs asthma exacerbation (tachy from hypoxia, leukocytosis from steroids)  -see plan for SOB and asthma  -f/u blood cx Pt met 2/4 SIRS criterial on admission (H 115, WBC 14.3). UA negative. CT chest demonstrates diffuse mosaic attenuation bilaterally with slight upper lung predominance. Possibly 2/2  atypical PNA vs asthma exacerbation (tachy from hypoxia, leukocytosis from steroids)  -see plan for SOB and asthma  -Procal neg. RVP (-)  -f/u blood cx

## 2020-09-23 NOTE — ED ADULT TRIAGE NOTE - NSTRIAGECARE_GEN_A_ER
Pt c/o persistent decreased fetal movement - present for last several weeks. Causing anxiety and worry  In light of above, in setting of 38w6d, recommend delivery  Pt agreeable  Desires repeat CD and BTL    Sarwat Farr MD     Face Mask

## 2020-09-23 NOTE — H&P ADULT - NSHPLABSRESULTS_GEN_ALL_CORE
LABS:                         15.0   14.31 )-----------( 316      ( 23 Sep 2020 12:50 )             46.6     09-23    139  |  102  |  10  ----------------------------<  114<H>  3.9   |  24  |  0.61    Ca    9.4      23 Sep 2020 12:50    TPro  8.7<H>  /  Alb  3.8  /  TBili  1.0  /  DBili  x   /  AST  12  /  ALT  9<L>  /  AlkPhos  105  09-23    PT/INR - ( 23 Sep 2020 12:50 )   PT: 13.4 sec;   INR: 1.12          PTT - ( 23 Sep 2020 12:50 )  PTT:34.0 sec  Urinalysis Basic - ( 23 Sep 2020 14:39 )    Color: Yellow / Appearance: Clear / SG: >=1.030 / pH: x  Gluc: x / Ketone: NEGATIVE  / Bili: Negative / Urobili: 0.2 E.U./dL   Blood: x / Protein: NEGATIVE mg/dL / Nitrite: NEGATIVE   Leuk Esterase: NEGATIVE / RBC: x / WBC x   Sq Epi: x / Non Sq Epi: x / Bacteria: x      CARDIAC MARKERS ( 23 Sep 2020 12:50 )  x     / <0.01 ng/mL / x     / x     / x

## 2020-09-23 NOTE — ED PROVIDER NOTE - PROGRESS NOTE DETAILS
[place]: wbc 14, other labs grossly wnl. XR initially read as opacities. CT eprformed appearing grossly unchanged from prior. HR improved. Satting well on NC. Pt feeling much better, air movement improved, still minimal wheeze. Will admit for further care. covid pending. updated pt.

## 2020-09-23 NOTE — H&P ADULT - ASSESSMENT
61 yo F w/ a PMHx of asthma (no hx of intubations, multiple hospitalizations, last admitted in July 2020) p/w SOB, chest tightness, and dry cough x2wks. Patient admitted to [facility] for further management of care.

## 2020-09-23 NOTE — H&P ADULT - PROBLEM SELECTOR PLAN 5
F: tolerating PO, no IVF  E: replete K<4, Mg<2  N: Regular Diet    VTE Prophylaxis: SCD  GI: not needed  C: Full Code  D: RMF

## 2020-09-23 NOTE — ED PROVIDER NOTE - CLINICAL SUMMARY MEDICAL DECISION MAKING FREE TEXT BOX
60F PMH asthma (frequent exacerbations, no intubations, last admitted [facility] ~2mos ago), p/w SOB/CP. Pt w/ 2w of slowly worsening SOB. Also dry cough. Also b/l CP, worse w/ coughing. Finally came to ED 2/2 slowly worsening symptoms. Also subjective fever today, occasional nausea. Also HA, gradual onset, intermittent x2w. No other systemic symptoms. Tachycardic/hypoxic, other vitals wnl. Exam as above. Well appearing and satting low to mid 90s on NC.   ddx: Likely RAD, possible infectious process, less likely ACS. Clinically benign HA.   LAbs, CXR, IVF/symptom control, reassess.

## 2020-09-23 NOTE — H&P ADULT - NSHPREVIEWOFSYSTEMS_GEN_ALL_CORE
Constitutional: No fevers or chills  HEENT: No visual changes;  No vertigo or throat pain. No neck pain or stiffness  Cardio: No palpitations  Resp: No wheezing, hemoptysis  GI: No abdominal or epigastric pain. No nausea, vomiting, or hematemesis; No constipation. No melena or hematochezia.  : No dysuria, frequency or hematuria  Neuro: No numbness or weakness  Skin: No itching, rashes

## 2020-09-23 NOTE — H&P ADULT - PROBLEM SELECTOR PLAN 2
-RVP Pt presents w/ 2 weeks SOB, chest tightness and nonproductive cough. Pt also endorses diarrhea x2 wks. Mentioned home bathroom has black mold along ceiling and walls. COVID negative. CXR shows b/l interstitial opacities. CT chest shows diffuse mosaic attenuation bilaterally with slight upper lung predominance  -f/u legionella ag  -f/u RVP  -see plan for asthma Pt presents w/ 2 weeks SOB, chest tightness and nonproductive cough. Pt also endorses diarrhea x2 wks. Mentioned home bathroom has black mold along ceiling and walls. COVID negative. CXR shows b/l interstitial opacities. CT chest shows diffuse mosaic attenuation bilaterally with slight upper lung predominance. DDx includes hypersensitivity pneumonitis vs PNA vs asthma exacerbation  -f/u PAULA, RF, hypersensitivity panel,   -f/u legionella ag  -f/u RVP  -see plan for asthma

## 2020-09-23 NOTE — H&P ADULT - NSHPPHYSICALEXAM_GEN_ALL_CORE
Gen: NAD, laying in bed, well appearing, alert, interactive  HEENT: PERRL, anicteric sclera, no JVD, no thyromegaly  Cardio: +S1/S2, RRR, no murmurs  Resp: CTA b/l, minimal wheezing  GI: +BS x4, NT/ND  Ext: no peripheral edema, NROM x4  Vasc: 3+ peripheral pulses  Skin: warm, dry, and intact. no rashes, wounds or scars  Neuro: AAOx3, CN II-XII intact, no focal deficits

## 2020-09-23 NOTE — PATIENT PROFILE ADULT - NSPRONUTRITIONRISK_GEN_A_NUR
MARLIN received referral from PsAdelina Jiménez to facilitate EMS transport for pt to [facility] ED for cardiac assessment and assessment of other symptoms. MARLIN contacted EMS and arranged ambulance transportation. MARLIN contacted Patient Relations to facilitate transportation for pt back to [facility] to retrieve his car. MARLIN alerted security to secure pt's car while pt is assessed. MALRIN intends to remain available to assist pt with any identified needs.
No indicators present

## 2020-09-23 NOTE — ED PROVIDER NOTE - PHYSICAL EXAMINATION
Pt on NC. No tachypnea. decreased air entry b/l, minimal diffuse expiratory wheeze, no resp distress or accessory muscle use.   no LE edema, normal equal distal pulses

## 2020-09-23 NOTE — ED ADULT NURSE REASSESSMENT NOTE - NS ED NURSE REASSESS COMMENT FT1
Pt resting in stretcher pending CT scan. Pt states, "I feel much better I can breathe again." Continuous cardiac/pulse oximetry monitoring remains in place.

## 2020-09-24 ENCOUNTER — TRANSCRIPTION ENCOUNTER (OUTPATIENT)
Age: 60
End: 2020-09-24

## 2020-09-24 DIAGNOSIS — J96.01 ACUTE RESPIRATORY FAILURE WITH HYPOXIA: ICD-10-CM

## 2020-09-24 DIAGNOSIS — J45.909 UNSPECIFIED ASTHMA, UNCOMPLICATED: ICD-10-CM

## 2020-09-24 DIAGNOSIS — R91.8 OTHER NONSPECIFIC ABNORMAL FINDING OF LUNG FIELD: ICD-10-CM

## 2020-09-24 LAB
ANION GAP SERPL CALC-SCNC: 13 MMOL/L — SIGNIFICANT CHANGE UP (ref 5–17)
BASOPHILS # BLD AUTO: 0.02 K/UL — SIGNIFICANT CHANGE UP (ref 0–0.2)
BASOPHILS NFR BLD AUTO: 0.2 % — SIGNIFICANT CHANGE UP (ref 0–2)
BUN SERPL-MCNC: 17 MG/DL — SIGNIFICANT CHANGE UP (ref 7–23)
CALCIUM SERPL-MCNC: 8.5 MG/DL — SIGNIFICANT CHANGE UP (ref 8.4–10.5)
CHLORIDE SERPL-SCNC: 103 MMOL/L — SIGNIFICANT CHANGE UP (ref 96–108)
CO2 SERPL-SCNC: 22 MMOL/L — SIGNIFICANT CHANGE UP (ref 22–31)
CREAT SERPL-MCNC: 0.57 MG/DL — SIGNIFICANT CHANGE UP (ref 0.5–1.3)
CULTURE RESULTS: SIGNIFICANT CHANGE UP
EOSINOPHIL # BLD AUTO: 0 K/UL — SIGNIFICANT CHANGE UP (ref 0–0.5)
EOSINOPHIL NFR BLD AUTO: 0 % — SIGNIFICANT CHANGE UP (ref 0–6)
GLUCOSE SERPL-MCNC: 146 MG/DL — HIGH (ref 70–99)
HCT VFR BLD CALC: 40.4 % — SIGNIFICANT CHANGE UP (ref 34.5–45)
HGB BLD-MCNC: 13 G/DL — SIGNIFICANT CHANGE UP (ref 11.5–15.5)
IGE SERPL-ACNC: 55 KU/L — SIGNIFICANT CHANGE UP
IMM GRANULOCYTES NFR BLD AUTO: 0.5 % — SIGNIFICANT CHANGE UP (ref 0–1.5)
LYMPHOCYTES # BLD AUTO: 1.35 K/UL — SIGNIFICANT CHANGE UP (ref 1–3.3)
LYMPHOCYTES # BLD AUTO: 12.5 % — LOW (ref 13–44)
MAGNESIUM SERPL-MCNC: 2.1 MG/DL — SIGNIFICANT CHANGE UP (ref 1.6–2.6)
MCHC RBC-ENTMCNC: 28.9 PG — SIGNIFICANT CHANGE UP (ref 27–34)
MCHC RBC-ENTMCNC: 32.2 GM/DL — SIGNIFICANT CHANGE UP (ref 32–36)
MCV RBC AUTO: 89.8 FL — SIGNIFICANT CHANGE UP (ref 80–100)
MONOCYTES # BLD AUTO: 1.02 K/UL — HIGH (ref 0–0.9)
MONOCYTES NFR BLD AUTO: 9.4 % — SIGNIFICANT CHANGE UP (ref 2–14)
NEUTROPHILS # BLD AUTO: 8.38 K/UL — HIGH (ref 1.8–7.4)
NEUTROPHILS NFR BLD AUTO: 77.4 % — HIGH (ref 43–77)
NRBC # BLD: 0 /100 WBCS — SIGNIFICANT CHANGE UP (ref 0–0)
PHOSPHATE SERPL-MCNC: 3.2 MG/DL — SIGNIFICANT CHANGE UP (ref 2.5–4.5)
PLATELET # BLD AUTO: 207 K/UL — SIGNIFICANT CHANGE UP (ref 150–400)
POTASSIUM SERPL-MCNC: 4.1 MMOL/L — SIGNIFICANT CHANGE UP (ref 3.5–5.3)
POTASSIUM SERPL-SCNC: 4.1 MMOL/L — SIGNIFICANT CHANGE UP (ref 3.5–5.3)
PROCALCITONIN SERPL-MCNC: 0.05 NG/ML — SIGNIFICANT CHANGE UP (ref 0.02–0.1)
RBC # BLD: 4.5 M/UL — SIGNIFICANT CHANGE UP (ref 3.8–5.2)
RBC # FLD: 13.5 % — SIGNIFICANT CHANGE UP (ref 10.3–14.5)
RHEUMATOID FACT SERPL-ACNC: 16 IU/ML — HIGH (ref 0–13)
SODIUM SERPL-SCNC: 138 MMOL/L — SIGNIFICANT CHANGE UP (ref 135–145)
SPECIMEN SOURCE: SIGNIFICANT CHANGE UP
WBC # BLD: 10.82 K/UL — HIGH (ref 3.8–10.5)
WBC # FLD AUTO: 10.82 K/UL — HIGH (ref 3.8–10.5)

## 2020-09-24 PROCEDURE — 99233 SBSQ HOSP IP/OBS HIGH 50: CPT | Mod: GC

## 2020-09-24 PROCEDURE — 99223 1ST HOSP IP/OBS HIGH 75: CPT | Mod: GC

## 2020-09-24 RX ORDER — BUDESONIDE AND FORMOTEROL FUMARATE DIHYDRATE 160; 4.5 UG/1; UG/1
2 AEROSOL RESPIRATORY (INHALATION)
Refills: 0 | Status: DISCONTINUED | OUTPATIENT
Start: 2020-09-24 | End: 2020-09-28

## 2020-09-24 RX ADMIN — Medication 3 MILLILITER(S): at 22:45

## 2020-09-24 RX ADMIN — Medication 3 MILLILITER(S): at 09:15

## 2020-09-24 RX ADMIN — Medication 3 MILLILITER(S): at 16:24

## 2020-09-24 RX ADMIN — Medication 40 MILLIGRAM(S): at 05:46

## 2020-09-24 RX ADMIN — BUDESONIDE AND FORMOTEROL FUMARATE DIHYDRATE 2 PUFF(S): 160; 4.5 AEROSOL RESPIRATORY (INHALATION) at 17:35

## 2020-09-24 RX ADMIN — Medication 3 MILLILITER(S): at 04:54

## 2020-09-24 NOTE — CONSULT NOTE ADULT - SUBJECTIVE AND OBJECTIVE BOX
PULMONARY SERVICE INITIAL CONSULT NOTE    HPI:    59 y/o female with a reported history of asthma who presents to the hospital for acute onset of dyspnea. She has been hospitalized multiple times over the preceding year for dyspnea, cough, wheezing, sputum production and told that she is having an asthma exacerbation. She has visited an ER approximately 10-12 times over the last         REVIEW OF SYSTEMS:  Constitutional: No fever, weight loss or fatigue  Eyes: No eye pain, visual disturbances, or discharge  ENMT:  No difficulty hearing, tinnitus, vertigo; No sinus or throat pain  Neck: No pain, stiffness or neck swelling  Respiratory: see HPI  Cardiovascular: No chest pain, palpitations, dizziness or leg swelling  Gastrointestinal: No abdominal or epigastric pain. No nausea, vomiting or hematemesis; No diarrhea or constipation. No melena or hematochezia.  Genitourinary: No dysuria, frequency, hematuria or incontinence  Neurological: No headaches, memory loss, loss of strength, numbness or tremors  Skin: No itching, burning, rashes or lesions   Lymph Nodes: No enlarged glands  Endocrine: No heat or cold intolerance; No hair loss  Musculoskeletal: No joint pain or swelling; No muscle, back or extremity pain  Psychiatric: No depression, anxiety, mood swings or difficulty sleeping  Heme/Lymph: No easy bruising or bleeding gums  Allergy and Immunologic: No hives or eczema    PAST MEDICAL & SURGICAL HISTORY:  Fibroid    Asthma    S/P  section    S/P cholecystectomy        FAMILY HISTORY:  Family history of renal disease    FH: breast cancer    FH: lymphoma        SOCIAL HISTORY:  Smoking Status: [ ] Current, [ ] Former, [ ] Never  Pack Years:    MEDICATIONS:  Pulmonary:  ALBUTerol    90 MICROgram(s) HFA Inhaler 1 Puff(s) Inhalation every 4 hours  albuterol/ipratropium for Nebulization 3 milliLiter(s) Nebulizer every 6 hours  tiotropium 18 MICROgram(s) Capsule 1 Capsule(s) Inhalation daily    Antimicrobials:    Anticoagulants:    Onc:    GI/:    Endocrine:  predniSONE   Tablet 40 milliGRAM(s) Oral daily    Cardiac:    Other Medications:  influenza   Vaccine 0.5 milliLiter(s) IntraMuscular once      Allergies    allerigc to contrast dye (Short breath; Angioedema)  IV Contrast (Short breath; Angioedema)    Intolerances        Vital Signs Last 24 Hrs  T(C): 36.3 (24 Sep 2020 05:54), Max: 37.6 (23 Sep 2020 13:00)  T(F): 97.3 (24 Sep 2020 05:54), Max: 99.7 (23 Sep 2020 13:00)  HR: 116 (24 Sep 2020 05:54) (105 - 116)  BP: 134/77 (24 Sep 2020 05:54) (129/71 - 151/90)  BP(mean): --  RR: 18 (24 Sep 2020 05:54) (18 - 20)  SpO2: 93% (24 Sep 2020 05:54) (84% - 96%)        PHYSICAL EXAM:  Constitutional: WDWN  Head: NC/AT  EENT: PERRL, anicteric sclera; oropharynx clear, MMM  Neck: supple, no appreciable JVD  Respiratory: CTA B/L; no W/R/R  Cardiovascular: +S1/S2, RRR  Gastrointestinal: soft, NT/ND  Extremities: WWP; no edema, clubbing or cyanosis  Vascular: 2+ radial pulses B/L  Neurological: awake and alert; GALLOWAY    LABS:      CBC Full  -  ( 23 Sep 2020 12:50 )  WBC Count : 14.31 K/uL  RBC Count : 5.24 M/uL  Hemoglobin : 15.0 g/dL  Hematocrit : 46.6 %  Platelet Count - Automated : 316 K/uL  Mean Cell Volume : 88.9 fl  Mean Cell Hemoglobin : 28.6 pg  Mean Cell Hemoglobin Concentration : 32.2 gm/dL  Auto Neutrophil # : 11.26 K/uL  Auto Lymphocyte # : 2.08 K/uL  Auto Monocyte # : 0.79 K/uL  Auto Eosinophil # : 0.07 K/uL  Auto Basophil # : 0.08 K/uL  Auto Neutrophil % : 78.7 %  Auto Lymphocyte % : 14.5 %  Auto Monocyte % : 5.5 %  Auto Eosinophil % : 0.5 %  Auto Basophil % : 0.6 %        139  |  102  |  10  ----------------------------<  114<H>  3.9   |  24  |  0.61    Ca    9.4      23 Sep 2020 12:50    TPro  8.7<H>  /  Alb  3.8  /  TBili  1.0  /  DBili  x   /  AST  12  /  ALT  9<L>  /  AlkPhos  105  23    PT/INR - ( 23 Sep 2020 12:50 )   PT: 13.4 sec;   INR: 1.12          PTT - ( 23 Sep 2020 12:50 )  PTT:34.0 sec      Urinalysis Basic - ( 23 Sep 2020 14:39 )    Color: Yellow / Appearance: Clear / SG: >=1.030 / pH: x  Gluc: x / Ketone: NEGATIVE  / Bili: Negative / Urobili: 0.2 E.U./dL   Blood: x / Protein: NEGATIVE mg/dL / Nitrite: NEGATIVE   Leuk Esterase: NEGATIVE / RBC: x / WBC x   Sq Epi: x / Non Sq Epi: x / Bacteria: x                RADIOLOGY & ADDITIONAL STUDIES: PULMONARY SERVICE INITIAL CONSULT NOTE    HPI:    59 y/o female with a reported history of asthma who presents to the hospital for acute onset of dyspnea. She has been hospitalized multiple times over the preceding year for dyspnea, cough, wheezing, sputum production and told that she is having an asthma exacerbation. She has visited an ER approximately 10-12 times over the last year and been hospitalized for asthma exacerbations. She takes prednisone and has mild to moderate improvement. When she returns home she begins to have declining lung function and ultimately has to return to the hospital for shortness of breath. She has no childhood history of asthma, no strong family history of asthma, and did not have spirometry initially to diagnose her asthma. She has consistently taken her inhalers and medications as prescribed. no new medications. She was seen outpatient and noted to have PFTs with severe restriction and reduced DLCO w/o evidence of obstruction. She presents this hospitalization with similar symptoms to her previous hospitalizations SOB, chest tightness, and nonproductive cough x2wks. Her symptoms always occur at home. No one else in the home is sick. She has known mold exposures in her home. Patient works for the [employer], has no chemical exposures that she is aware of, is a non smoker. She has pet dogs at home.     REVIEW OF SYSTEMS:  Constitutional: No fever, weight loss or fatigue  Eyes: No eye pain, visual disturbances, or discharge  ENMT:  No difficulty hearing, tinnitus, vertigo; No sinus or throat pain  Neck: No pain, stiffness or neck swelling  Respiratory: see HPI  Cardiovascular: No chest pain, palpitations, dizziness or leg swelling  Gastrointestinal: No abdominal or epigastric pain. No nausea, vomiting or hematemesis; No diarrhea or constipation. No melena or hematochezia.  Genitourinary: No dysuria, frequency, hematuria or incontinence  Neurological: No headaches, memory loss, loss of strength, numbness or tremors  Skin: No itching, burning, rashes or lesions   Lymph Nodes: No enlarged glands  Endocrine: No heat or cold intolerance; No hair loss  Musculoskeletal: No joint pain or swelling; No muscle, back or extremity pain  Psychiatric: No depression, anxiety, mood swings or difficulty sleeping  Heme/Lymph: No easy bruising or bleeding gums  Allergy and Immunologic: No hives or eczema    PAST MEDICAL & SURGICAL HISTORY:  Fibroid    Asthma    S/P  section    S/P cholecystectomy        FAMILY HISTORY:  Family history of renal disease    FH: breast cancer    FH: lymphoma        SOCIAL HISTORY:  Smoking Status: [ ] Current, [ ] Former, [x ] Never  Pack Years: n/a     MEDICATIONS:  Pulmonary:  ALBUTerol    90 MICROgram(s) HFA Inhaler 1 Puff(s) Inhalation every 4 hours  albuterol/ipratropium for Nebulization 3 milliLiter(s) Nebulizer every 6 hours  tiotropium 18 MICROgram(s) Capsule 1 Capsule(s) Inhalation daily    Antimicrobials:    Anticoagulants:    Onc:    GI/:    Endocrine:  predniSONE   Tablet 40 milliGRAM(s) Oral daily    Cardiac:    Other Medications:  influenza   Vaccine 0.5 milliLiter(s) IntraMuscular once      Allergies    allerigc to contrast dye (Short breath; Angioedema)  IV Contrast (Short breath; Angioedema)    Intolerances        Vital Signs Last 24 Hrs  T(C): 36.3 (24 Sep 2020 05:54), Max: 37.6 (23 Sep 2020 13:00)  T(F): 97.3 (24 Sep 2020 05:54), Max: 99.7 (23 Sep 2020 13:00)  HR: 116 (24 Sep 2020 05:54) (105 - 116)  BP: 134/77 (24 Sep 2020 05:54) (129/71 - 151/90)  BP(mean): --  RR: 18 (24 Sep 2020 05:54) (18 - 20)  SpO2: 93% (24 Sep 2020 05:54) (84% - 96%)        PHYSICAL EXAM:  Constitutional: WDWN  Head: NC/AT  EENT: PERRL, anicteric sclera; oropharynx clear, MMM  Neck: supple, no appreciable JVD  Respiratory: CTA B/L; no W/R/R  Cardiovascular: +S1/S2, RRR  Gastrointestinal: soft, NT/ND  Extremities: WWP; no edema, clubbing or cyanosis  Vascular: 2+ radial pulses B/L  Neurological: awake and alert; GALLOWAY    LABS:      CBC Full  -  ( 23 Sep 2020 12:50 )  WBC Count : 14.31 K/uL  RBC Count : 5.24 M/uL  Hemoglobin : 15.0 g/dL  Hematocrit : 46.6 %  Platelet Count - Automated : 316 K/uL  Mean Cell Volume : 88.9 fl  Mean Cell Hemoglobin : 28.6 pg  Mean Cell Hemoglobin Concentration : 32.2 gm/dL  Auto Neutrophil # : 11.26 K/uL  Auto Lymphocyte # : 2.08 K/uL  Auto Monocyte # : 0.79 K/uL  Auto Eosinophil # : 0.07 K/uL  Auto Basophil # : 0.08 K/uL  Auto Neutrophil % : 78.7 %  Auto Lymphocyte % : 14.5 %  Auto Monocyte % : 5.5 %  Auto Eosinophil % : 0.5 %  Auto Basophil % : 0.6 %        139  |  102  |  10  ----------------------------<  114<H>  3.9   |  24  |  0.61    Ca    9.4      23 Sep 2020 12:50    TPro  8.7<H>  /  Alb  3.8  /  TBili  1.0  /  DBili  x   /  AST  12  /  ALT  9<L>  /  AlkPhos  105      PT/INR - ( 23 Sep 2020 12:50 )   PT: 13.4 sec;   INR: 1.12          PTT - ( 23 Sep 2020 12:50 )  PTT:34.0 sec      Urinalysis Basic - ( 23 Sep 2020 14:39 )    Color: Yellow / Appearance: Clear / SG: >=1.030 / pH: x  Gluc: x / Ketone: NEGATIVE  / Bili: Negative / Urobili: 0.2 E.U./dL   Blood: x / Protein: NEGATIVE mg/dL / Nitrite: NEGATIVE   Leuk Esterase: NEGATIVE / RBC: x / WBC x   Sq Epi: x / Non Sq Epi: x / Bacteria: x    RADIOLOGY & ADDITIONAL STUDIES:    CT chest: diffuse ground glass opacities and mosaic attenuation with apical predominance. sub pleural sparing noted in basal components.

## 2020-09-24 NOTE — DISCHARGE NOTE PROVIDER - CARE PROVIDER_API CALL
Solo Caballero)  [facility] Medicine; Internal Medicine  [address] [place] [address]  Phone: [phone]  Fax: [phone]  Follow Up Time:    Solo Caballero)  [facility] Medicine; Internal Medicine  [address] [place] [address]  Phone: [phone]  Fax: [phone]  Follow Up Time: 2 weeks   Solo Caballero)  [facility] Medicine; Internal Medicine  [address] [place] [address]  Phone: [phone]  Fax: [phone]  Follow Up Time: 2 weeks    Magda Brown  CRITICAL CARE MEDICINE  [address], [place] [address]  Phone: [phone]  Fax: [phone]  Established Patient  Scheduled Appointment: 09/30/2020 01:00 PM

## 2020-09-24 NOTE — PROGRESS NOTE ADULT - PROBLEM SELECTOR PLAN 1
Pt met 2/4 SIRS criterial on admission (H 115, WBC 14.3). UA negative. CT chest demonstrates diffuse mosaic attenuation bilaterally with slight upper lung predominance. Possibly 2/2  atypical PNA vs asthma exacerbation (tachy from hypoxia, leukocytosis from steroids)  -see plan for SOB and asthma  -Procal neg. RVP (-)  - Blood cultures negative at 12 hours

## 2020-09-24 NOTE — DISCHARGE NOTE PROVIDER - NSDCCPCAREPLAN_GEN_ALL_CORE_FT
PRINCIPAL DISCHARGE DIAGNOSIS  Diagnosis: Shortness of breath  Assessment and Plan of Treatment:        PRINCIPAL DISCHARGE DIAGNOSIS  Diagnosis: Interstitial lung disease  Assessment and Plan of Treatment: You presented with shortness of breath due to a lung condition called interstitial lung disease. You improved with breathing treatments and steroids in the hospital.   You have been set up with oxygen at home. You require 6 liters per minute of oxygen to maintain adequate oxygenation. Also continue to use your albuterol and breo ellipta as prescribed.  You will continue taking steroids for 6 more days at home. Please  your prescription for prednisone at the pharmacy, and take 40 mg (two 20mg tablets) every day starting today and ending on Saturday October 3rd. Please attend your follow-up appointment with Dr. Brown in clinic on Wednesday Sept 30 at 1:00pm.       PRINCIPAL DISCHARGE DIAGNOSIS  Diagnosis: Interstitial lung disease  Assessment and Plan of Treatment: You presented with shortness of breath due to a lung condition called interstitial lung disease. You improved with breathing treatments and steroids in the hospital.   You have been set up with oxygen at home. You require 6 liters per minute of oxygen to maintain adequate oxygenation. Also continue to use your albuterol and breo ellipta as prescribed.  You will continue taking steroids at home. Please  your prescription for prednisone at the pharmacy, and take 40 mg (two 20mg tablets) every day until you meet with Dr. Brown and verify the length of time she would like you take. Please attend your follow-up appointment with Dr. Brown in clinic on Wednesday Sept 30 at 1:00pm.       PRINCIPAL DISCHARGE DIAGNOSIS  Diagnosis: Interstitial lung disease  Assessment and Plan of Treatment: You presented with shortness of breath due to a lung condition called interstitial lung disease. You improved with breathing treatments and steroids in the hospital.   You have been set up with oxygen at home. You require 6 liters per minute of oxygen to maintain adequate oxygenation. Also continue to use your albuterol and breo ellipta as prescribed.  You will continue taking steroids at home. Please  your prescription for prednisone at the pharmacy, and take 40 mg (two 20mg tablets) every day until you meet with Dr. Brown and verify the length of time she would like you take. Also, please  your prescription for the antibiotic Bactrim. Take this on Monday, Wednesday, and Friday for prophylaxis against opportunistic infections while on prednisone. Please attend your follow-up appointment with Dr. Brown in clinic on Wednesday Sept 30 at 1:00pm.

## 2020-09-24 NOTE — PROGRESS NOTE ADULT - PROBLEM SELECTOR PLAN 3
Pt has hx of asthma w/ multiple hospitalizations (last admitted July 2020). No hx of intubations. On home albuterol and breo  -s/p duonebs, solumedrol in ED  -c/w albuterol  -c/w breo Pt has hx of asthma w/ multiple hospitalizations (last admitted July 2020). No hx of intubations. On home albuterol and breo  -s/p duonebs, solumedrol in ED  -c/w duonebs and albuterol

## 2020-09-24 NOTE — DISCHARGE NOTE PROVIDER - PROVIDER TOKENS
PROVIDER:[TOKEN:[[id number]]] PROVIDER:[TOKEN:[[id number]],FOLLOWUP:[2 weeks]] PROVIDER:[TOKEN:[[id number]],FOLLOWUP:[2 weeks]],PROVIDER:[TOKEN:[[id number]],SCHEDULEDAPPT:[09/30/2020],SCHEDULEDAPPTTIME:[01:00 PM],ESTABLISHEDPATIENT:[T]]

## 2020-09-24 NOTE — PROGRESS NOTE ADULT - PROBLEM SELECTOR PLAN 2
Pt presents w/ 2 weeks SOB, chest tightness and nonproductive cough. Pt also endorses diarrhea x2 wks. Mentioned home bathroom has black mold along ceiling and walls. COVID negative. CXR shows b/l interstitial opacities. CT chest shows diffuse mosaic attenuation bilaterally with slight upper lung predominance. DDx includes hypersensitivity pneumonitis vs PNA vs asthma exacerbation  - PAULA negative  -f/u RF, hypersensitivity panel,   -f/u legionella ag  -f/u RVP  -see plan for asthma Pt presents w/ 2 weeks SOB, chest tightness and nonproductive cough. Pt also endorses diarrhea x2 wks. Mentioned home bathroom has black mold along ceiling and walls. COVID negative. CXR shows b/l interstitial opacities. CT chest shows diffuse mosaic attenuation bilaterally with slight upper lung predominance. DDx includes hypersensitivity pneumonitis vs PNA vs asthma exacerbation  - PAULA negative  -f/u RF, hypersensitivity panel,   -f/u legionella ag  -f/u RVP  -see plan for asthma  - Patient to undergo bronchoscopy tomorrow; will make NPO at midnight, obtain pre-op labs

## 2020-09-24 NOTE — DISCHARGE NOTE PROVIDER - HOSPITAL COURSE
#Discharge: do not delete    Patient is 61yo F with past medical history of asthma with multiple hospitalizations without intubations.  Presented with 2 weeks of SOB and nonproductive cough, found to have hypersensitivity pneumonitis.    Problem List/Main Diagnoses (system-based):   Problem/Plan - 1:  ·  Problem: Shortness of breath.  - Pt presents w/ 2 weeks SOB, chest tightness and nonproductive cough. Pt also endorses occasional diarrhea x2 wks. Mentioned home bathroom has black mold along ceiling and walls. COVID negative. CXR shows b/l interstitial opacities. CT chest shows diffuse mosaic attenuation bilaterally with slight upper lung predominance. History (Aug 2020 PFT showing restrictive pattern) and presentation suggestive more of hypersensitivity pneumonitis  - PAULA (-), RF (+)  - RVP (-)  - HYPERSENSITIVITY PANEL, CCP, and LEGIONELLA AG RESULTS  - Treated with albuterol, duonebs, and symbicort  - Patient breathing on 4-6L NC. Patient will need supplement O2 upon discharge.  - Bronchoscopy on 9/25 showed RESULTS OF BRONCHOSCOPY      Problem/Plan - 2:  ·  Problem: Asthma.  Plan: Pt has hx of asthma w/ multiple hospitalizations (last admitted July 2020). No hx of intubations. On home albuterol and breo. Past PFTs suggestive of restrictive rather than obstructive pattern. Consider HP in a patient with underlying asthma  - On duonebs, albuterol, and symbicort  - Patient will need supplemental O2 upon discharge    Problem/Plan - 3:  ·  Problem: SIRS (systemic inflammatory response syndrome).  Plan: Pt met 2/4 SIRS criterial on admission (H 115, WBC 14.3). UA negative. CT chest demonstrates diffuse mosaic attenuation bilaterally with slight upper lung predominance. Likely due to HP. Low suspicion for infectious process.  - See plan for SOB and asthma  - Procal neg. RVP (-),   - Blood cultures negative at 24 hours.   - RESULTS OF URINE CULTURE    Problem/Plan - 4:  ·  Problem: Breast nodule.  Plan: Soft tissue nodule in left breast lower outer quadrant identified on CT chest. Unchanged.  - Recommend follow-up outpatient; as per patient, she had ultrasound done in august.     Inpatient treatment course:   60y F with PMH of asthma (no hx of intubations, multiple hospitalizations, last admitted July 2020) who presented to the ED with SOB, chest tightness, and nonproductive cough for 2 weeks. In the ED, patient was afebrile but tachycardic () and breathing at 84% on RA (improved to 94% with 3L O2). CBC showed leukocytosis with a negative troponin. COVID negative and negative UA on admission. CT chest showed diffuse mosaic attenuation bilaterally with slight upper lung predominance. CXR showed bilateral interstitial opacities. Patient was started on duonebs 3ml neb x2, solumedrol 125ml IV x1, 1L NS, and acetaminophen 650mg x1. On the floor, patient was given another dose of prednisone and was managed on albuterol, duonebs, and symbicort. Patient was kept on 4-6L NC. On 9/25, the patient underwent a bronchoscopy, which showed BRONCHOSCOPY RESULTS HERE.    New medications:     Labs to be followed outpatient: none    Exam to be followed outpatient: none     #Discharge: do not delete    Patient is 59yo F with past medical history of asthma with multiple hospitalizations without intubations.  Presented with 2 weeks of SOB and nonproductive cough, found to have hypersensitivity pneumonitis.    Problem List/Main Diagnoses (system-based):   Problem/Plan - 1:  ·  Problem: Shortness of breath.  - Pt presents w/ 2 weeks SOB, chest tightness and nonproductive cough. Pt also endorses occasional diarrhea x2 wks. Mentioned home bathroom has black mold along ceiling and walls. COVID negative. CXR shows b/l interstitial opacities. CT chest shows diffuse mosaic attenuation bilaterally with slight upper lung predominance. History (Aug 2020 PFT showing restrictive pattern) and presentation suggestive more of hypersensitivity pneumonitis  - PAULA (-), RF (+)  - RVP (-)  - HYPERSENSITIVITY PANEL, CCP, and LEGIONELLA AG RESULTS  - Treated with albuterol, duonebs, and symbicort  - Patient breathing on 4-6L NC. Patient will need supplement O2 upon discharge.  - Bronchoscopy on 9/25 showed RESULTS OF BRONCHOSCOPY    Problem/Plan - 2:  ·  Problem: Asthma.  Plan: Pt has hx of asthma w/ multiple hospitalizations (last admitted July 2020). No hx of intubations. On home albuterol and breo. Past PFTs suggestive of restrictive rather than obstructive pattern. Consider HP in a patient with underlying asthma  - On duonebs, albuterol, and symbicort  - Patient will need supplemental O2 upon discharge    Problem/Plan - 3:  ·  Problem: SIRS (systemic inflammatory response syndrome).  Plan: Pt met 2/4 SIRS criterial on admission (H 115, WBC 14.3). UA negative. CT chest demonstrates diffuse mosaic attenuation bilaterally with slight upper lung predominance. Likely due to HP. Low suspicion for infectious process.  - See plan for SOB and asthma  - Procal neg. RVP (-),   - Blood cultures negative at 24 hours.   - RESULTS OF URINE CULTURE    Problem/Plan - 4:  ·  Problem: Breast nodule.  Plan: Soft tissue nodule in left breast lower outer quadrant identified on CT chest. Unchanged.  - Recommend follow-up outpatient; as per patient, she had ultrasound done in august.     Inpatient treatment course:   60y F with PMH of asthma (no hx of intubations, multiple hospitalizations, last admitted July 2020) who presented to the ED with SOB, chest tightness, and nonproductive cough for 2 weeks. In the ED, patient was afebrile but tachycardic () and breathing at 84% on RA (improved to 94% with 3L O2). CBC showed leukocytosis with a negative troponin. COVID negative and negative UA on admission. CT chest showed diffuse mosaic attenuation bilaterally with slight upper lung predominance. CXR showed bilateral interstitial opacities. Patient was started on duonebs 3ml neb x2, solumedrol 125ml IV x1, 1L NS, and acetaminophen 650mg x1. On the floor, patient was given another dose of prednisone and was managed on albuterol, duonebs, and symbicort. Patient was kept on 4-6L NC. On 9/25, the patient underwent a bronchoscopy, which showed BRONCHOSCOPY RESULTS HERE.    New medications:   - Prednisone oral 40mg daily for 10 days    Labs to be followed outpatient: none    Exam to be followed outpatient: none     #Discharge: do not delete    Patient is 61yo F with past medical history of asthma with multiple hospitalizations without intubations.  Presented with 2 weeks of SOB and nonproductive cough, found to have hypersensitivity pneumonitis.    Problem List/Main Diagnoses (system-based):   #Interstitial Lung Disease  - Pt presents w/ 2 weeks SOB, chest tightness and nonproductive cough. Pt also endorses occasional diarrhea x2 wks. Mentioned home bathroom has black mold along ceiling and walls. COVID negative. CXR shows b/l interstitial opacities. CT chest shows diffuse mosaic attenuation bilaterally with slight upper lung predominance. History (Aug 2020 PFT showing restrictive pattern) and presentation suggestive more of hypersensitivity pneumonitis  - PAULA (-), RF (+)  - RVP (-)  - IgE normal, CCP (-), and LEGIONELLA AG RESULTS  - Treated with albuterol, duonebs, and symbicort  - Patient breathing on 4-6L NC. Patient will need supplement O2 upon discharge.  - Bronchoscopy on 9/25 showed     #Asthma.  Plan: Pt has hx of asthma w/ multiple hospitalizations (last admitted July 2020). No hx of intubations. On home albuterol and breo. Past PFTs suggestive of restrictive rather than obstructive pattern. Consider HP in a patient with underlying asthma  - On duonebs, albuterol, and symbicort  - Patient will need supplemental O2 upon discharge    #SIRS (systemic inflammatory response syndrome).  Plan: Pt met 2/4 SIRS criterial on admission (H 115, WBC 14.3). UA negative. CT chest demonstrates diffuse mosaic attenuation bilaterally with slight upper lung predominance. Likely due to HP. Low suspicion for infectious process.  - See plan for SOB and asthma  - Procal neg. RVP (-),   - Blood cultures negative at 24 hours.   - RESULTS OF URINE CULTURE    #Breast nodule.  Plan: Soft tissue nodule in left breast lower outer quadrant identified on CT chest. Unchanged.  - Recommend follow-up outpatient; as per patient, she had ultrasound done in august.     Inpatient treatment course:   60y F with PMH of asthma (no hx of intubations, multiple hospitalizations, last admitted July 2020) who presented to the ED with SOB, chest tightness, and nonproductive cough for 2 weeks. In the ED, patient was afebrile but tachycardic () and breathing at 84% on RA (improved to 94% with 3L O2). CBC showed leukocytosis with a negative troponin. COVID negative and negative UA on admission. CT chest showed diffuse mosaic attenuation bilaterally with slight upper lung predominance. CXR showed bilateral interstitial opacities. Patient was started on duonebs 3ml neb x2, solumedrol 125ml IV x1, 1L NS, and acetaminophen 650mg x1. On the floor, patient was given another dose of prednisone and was managed on albuterol, duonebs, and symbicort. Patient was kept on 4-6L NC. On 9/25, the patient underwent a bronchoscopy, which showed interstitial lung disease.    New medications:   - Prednisone oral 40mg daily for 10 days    Labs to be followed outpatient: none    Exam to be followed outpatient: none     #Discharge: do not delete    Patient is 61yo F with past medical history of asthma with multiple hospitalizations without intubations. Presented with 2 weeks of SOB and nonproductive cough, found to have hypersensitivity pneumonitis and interstitial lung disease..    Problem List/Main Diagnoses (system-based):   #Interstitial Lung Disease. Pt presents w/ 2 weeks SOB, chest tightness and nonproductive cough. Pt also endorses occasional diarrhea x2 wks. Mentioned home bathroom has black mold along ceiling and walls. COVID negative. CXR shows b/l interstitial opacities. CT chest shows diffuse mosaic attenuation bilaterally with slight upper lung predominance. History (Aug 2020 PFT showing restrictive pattern) and presentation suggestive more of hypersensitivity pneumonitis. PAULA (-), RF (+), RVP (-), IgE normal, CCP (-), and urine legionella (-). Treated with albuterol, duonebs, and symbicort.  - d/c with Home O2 6 LPM.  - Bronchoscopy on 9/25 showed interstitial lung disease  - Follow-up outpatient with Dr. Magda Brown    #Asthma. Pt has hx of asthma w/ multiple hospitalizations (last admitted July 2020). No hx of intubations. On home albuterol and breo. Past PFTs suggestive of restrictive rather than obstructive pattern. Consider HP in a patient with underlying asthma.   - c/w albuterol, and symbicort  - Patient will need supplemental O2 upon discharge    #SIRS (systemic inflammatory response syndrome). Pt met 2/4 SIRS criterial on admission (H 115, WBC 14.3). UA negative. CT chest demonstrates diffuse mosaic attenuation bilaterally with slight upper lung predominance. Likely due to HP. Low suspicion for infectious process. See plan for SOB and asthma. Procal neg. RVP (-), Blood cultures negative at 24 hours.  - Resolved    #Breast nodule.  Plan: Soft tissue nodule in left breast lower outer quadrant identified on CT chest. Unchanged.  - Recommend follow-up outpatient; as per patient, she had ultrasound done in august.     Inpatient treatment course:   60y F with PMH of asthma (no hx of intubations, multiple hospitalizations, last admitted July 2020) who presented to the ED with SOB, chest tightness, and nonproductive cough for 2 weeks. In the ED, patient was afebrile but tachycardic () and breathing at 84% on RA (improved to 94% with 3L O2). CBC showed leukocytosis with a negative troponin. COVID negative and negative UA on admission. CT chest showed diffuse mosaic attenuation bilaterally with slight upper lung predominance. CXR showed bilateral interstitial opacities. Patient was started on duonebs 3ml neb x2, solumedrol 125ml IV x1, 1L NS, and acetaminophen 650mg x1. On the floor, patient was given another dose of prednisone and was managed on albuterol, duonebs, and symbicort. Patient was kept on 4-6L NC. On 9/25, the patient underwent a bronchoscopy, which showed interstitial lung disease.    New medications: Prednisone oral 40mg daily for 10 days    Labs to be followed outpatient: none    Exam to be followed outpatient: none     #Discharge: do not delete    Patient is 59yo F with past medical history of asthma with multiple hospitalizations without intubations. Presented with 2 weeks of SOB and nonproductive cough, found to have hypersensitivity pneumonitis and interstitial lung disease.    Problem List/Main Diagnoses (system-based):   #Interstitial Lung Disease. Pt presents w/ 2 weeks SOB, chest tightness and nonproductive cough. Pt also endorses occasional diarrhea x2 wks. Mentioned home bathroom has black mold along ceiling and walls. COVID negative. CXR shows b/l interstitial opacities. CT chest shows diffuse mosaic attenuation bilaterally with slight upper lung predominance. History (Aug 2020 PFT showing restrictive pattern) and presentation suggestive more of hypersensitivity pneumonitis. PAULA (-), RF (+), RVP (-), IgE normal, CCP (-), and urine legionella (-). Treated with albuterol, duonebs, and symbicort.  - d/c with Home O2 6 LPM.  - Bronchoscopy on 9/25 showed interstitial lung disease  - Follow-up outpatient with Dr. Magda Brown    #Asthma. Pt has hx of asthma w/ multiple hospitalizations (last admitted July 2020). No hx of intubations. On home albuterol and breo. Past PFTs suggestive of restrictive rather than obstructive pattern. Consider HP in a patient with underlying asthma.   - c/w albuterol, and symbicort  - Patient will need supplemental O2 upon discharge    #SIRS (systemic inflammatory response syndrome). Pt met 2/4 SIRS criterial on admission (H 115, WBC 14.3). UA negative. CT chest demonstrates diffuse mosaic attenuation bilaterally with slight upper lung predominance. Likely due to HP. Low suspicion for infectious process. See plan for SOB and asthma. Procal neg. RVP (-), Blood cultures negative at 24 hours.  - Resolved    #Breast nodule.  Plan: Soft tissue nodule in left breast lower outer quadrant identified on CT chest. Unchanged.  - Recommend follow-up outpatient; as per patient, she had ultrasound done in august.     Inpatient treatment course:   60y F with PMH of asthma (no hx of intubations, multiple hospitalizations, last admitted July 2020) who presented to the ED with SOB, chest tightness, and nonproductive cough for 2 weeks. In the ED, patient was afebrile but tachycardic () and breathing at 84% on RA (improved to 94% with 3L O2). CBC showed leukocytosis with a negative troponin. COVID negative and negative UA on admission. CT chest showed diffuse mosaic attenuation bilaterally with slight upper lung predominance. CXR showed bilateral interstitial opacities. Patient was started on duonebs 3ml neb x2, solumedrol 125ml IV x1, 1L NS, and acetaminophen 650mg x1. On the floor, patient was given another dose of prednisone and was managed on albuterol, duonebs, and symbicort. Patient was kept on 6L NC. On 9/25, the patient underwent a bronchoscopy, which showed interstitial lung disease.    New medications: Prednisone oral 40mg daily for 10 days    Labs to be followed outpatient: none    Exam to be followed outpatient: none     #Discharge: do not delete    Patient is 59yo F with past medical history of asthma with multiple hospitalizations without intubations. Presented with 2 weeks of SOB and nonproductive cough, found to have hypersensitivity pneumonitis and interstitial lung disease.    Problem List/Main Diagnoses (system-based):   #Interstitial Lung Disease. Pt presents w/ 2 weeks SOB, chest tightness and nonproductive cough. Pt also endorses occasional diarrhea x2 wks. Mentioned home bathroom has black mold along ceiling and walls. COVID negative. CXR shows b/l interstitial opacities. CT chest shows diffuse mosaic attenuation bilaterally with slight upper lung predominance. History (Aug 2020 PFT showing restrictive pattern) and presentation suggestive more of hypersensitivity pneumonitis. PAULA (-), RF (+), RVP (-), IgE normal, CCP (-), and urine legionella (-). Treated with albuterol, duonebs, and symbicort.  - d/c with Home O2 6 LPM.  - Bronchoscopy on 9/25 showed interstitial lung disease  - Follow-up outpatient with Dr. Magda Brown    #Asthma. Pt has hx of asthma w/ multiple hospitalizations (last admitted July 2020). No hx of intubations. On home albuterol and breo. Past PFTs suggestive of restrictive rather than obstructive pattern. Consider HP in a patient with underlying asthma.   - c/w albuterol, and symbicort  - Patient will need supplemental O2 upon discharge    #SIRS (systemic inflammatory response syndrome). Pt met 2/4 SIRS criterial on admission (H 115, WBC 14.3). UA negative. CT chest demonstrates diffuse mosaic attenuation bilaterally with slight upper lung predominance. Likely due to HP. Low suspicion for infectious process. See plan for SOB and asthma. Procal neg. RVP (-), Blood cultures negative at 24 hours.  - Resolved    #Breast nodule.  Plan: Soft tissue nodule in left breast lower outer quadrant identified on CT chest. Unchanged.  - Recommend follow-up outpatient; as per patient, she had ultrasound done in august.     Inpatient treatment course:   60y F with PMH of asthma (no hx of intubations, multiple hospitalizations, last admitted July 2020) who presented to the ED with SOB, chest tightness, and nonproductive cough for 2 weeks. In the ED, patient was afebrile but tachycardic () and breathing at 84% on RA (improved to 94% with 3L O2). CBC showed leukocytosis with a negative troponin. COVID negative and negative UA on admission. CT chest showed diffuse mosaic attenuation bilaterally with slight upper lung predominance. CXR showed bilateral interstitial opacities. Patient was started on duonebs 3ml neb x2, solumedrol 125ml IV x1, 1L NS, and acetaminophen 650mg x1. On the floor, patient was given another dose of prednisone and was managed on albuterol, duonebs, and symbicort. Patient was kept on 6L NC. On 9/25, the patient underwent a bronchoscopy, which showed interstitial lung disease.    New medications: Prednisone oral 40mg daily, Bactrim DS three times a week    Labs to be followed outpatient: none    Exam to be followed outpatient: none

## 2020-09-24 NOTE — PROGRESS NOTE ADULT - ATTENDING COMMENTS
patient admitted for shortness of breath and nonproductive cough in setting of nonadherence to asthma medications as they were causing headaches. patient known to me from prior admission and concern for hypersensitivity pneumonitis was present at that time as well given mold in household and work place and apparent different triggers to exacerbations.   patient was discharged on steroids last admission, which were stopped after short course.  no signs of infection at this point and believe this to be exacerbation of underlying lung disease.   RF factor pending - PFT's showing restrictive pattern outpatient (in congruence to asthma obstructive picture)  seen by pulmonology with plan for bronchoscopy tomorrow for further evaluation.

## 2020-09-24 NOTE — CONSULT NOTE ADULT - ATTENDING COMMENTS
60 year old female, never smoker with cute hypoxic respiratory failure with Mosaic attenuation on CT chest. Restriction of PFT. History suggestive of hypersensitivity pneumonitis as etiology for current symptoms and CT abnormalities. Patient has history of exposure to mold at home, which is most likely etiology. No occupational exposure. Patient has dog at home for last 8 years.   Plan:  - Continue oxygen  - Hold off on prednisone  - NPO after midnight , plan for bronchoscopy with BAL with TBLB tomorrow  - Connective tissue panel, hypersensitivity panel  - Patient told us that her building ,management is fixing mould in her apartment.  - Rest as above

## 2020-09-24 NOTE — DISCHARGE NOTE PROVIDER - NSDCMRMEDTOKEN_GEN_ALL_CORE_FT
albuterol 90 mcg/inh inhalation aerosol: 2 puff(s) inhaled every 6 hours, As Needed  Breo Ellipta 200 mcg-25 mcg/inh inhalation powder: 1 puff(s) inhaled once a day   albuterol 90 mcg/inh inhalation aerosol: 2 puff(s) inhaled every 6 hours, As Needed  Breo Ellipta 200 mcg-25 mcg/inh inhalation powder: 1 puff(s) inhaled once a day  predniSONE 20 mg oral tablet: 2 tab(s) orally every 24 hours   albuterol 90 mcg/inh inhalation aerosol: 2 puff(s) inhaled every 6 hours, As Needed  Bactrim  mg-160 mg oral tablet: 1 tab(s) orally 3 times a week, Monday, Wednesday, and Friday.  Breo Ellipta 200 mcg-25 mcg/inh inhalation powder: 1 puff(s) inhaled once a day  predniSONE 20 mg oral tablet: 2 tab(s) orally every 24 hours

## 2020-09-24 NOTE — CONSULT NOTE ADULT - PROBLEM SELECTOR RECOMMENDATION 9
- Patient presents with acute hypoxic respiratory failure and diffuse ground glass opacities with an apical predominance. The mosaic attenuation which may be caused by vascular or airways disease - subtypes of bronchiolitis.   - Her history and imaging are most suggestive of hypersensitivity pneumonitis. Supporting information include her cyclical worsening on reexposure to mold in her home, response to steroids when hospitalized, improvement in her symptoms when removed from her home (when hospitalized), her CT imaging as well as her PFTs which do not support and obstructive lung disease like asthma.  - Alternative diagnoses of her mosaic attenuation include small-airways disease, vascular lung diseases or infiltrative lung diseases.   - Unlikely to represent pHTN or CTEPH given no history of VTE given no other evidence of pHTN; normal pa diameter and RV:LV ratio. May also represent viral bronchiolitis, bronchiolitis obliterans, RB-ILD (non smoker) but these are less likely, constrictive bronchiolitis. Sarcoid also possible but no hilar lymphadenopathy makes less likely. Acute idiopathic eosinophilic pneumonia also can present with diffuse ground glass but her ct appears to be apical predominant which would be atypical; also no peripheral eosinophilia, fever, effusions or inflammatory symptoms to suggest eosinophilic disease.   - Auto immune serologies, IgE level, hypersensitivity panel   - Plan for bronchoscopy Friday with BAL and transbronchial biopsy to evaluate for cellular HP - Patient presents with acute hypoxic respiratory failure and diffuse ground glass opacities with an apical predominance. The mosaic attenuation which may be caused by vascular or airways disease - subtypes of bronchiolitis.   - Her history and imaging are most suggestive of hypersensitivity pneumonitis. Supporting information include her cyclical worsening on reexposure to mold in her home, response to steroids when hospitalized, improvement in her symptoms when removed from her home (when hospitalized), her CT imaging as well as her PFTs which do not support and obstructive lung disease like asthma.  - Alternative diagnoses of her mosaic attenuation include small-airways disease, vascular lung diseases or infiltrative lung diseases.   - Unlikely to represent pHTN or CTEPH given no history of VTE given no other evidence of pHTN; normal pa diameter and RV:LV ratio. May also represent viral bronchiolitis, bronchiolitis obliterans, RB-ILD (non smoker) but these are less likely, constrictive bronchiolitis. Sarcoid also possible but no hilar lymphadenopathy makes less likely. Acute idiopathic eosinophilic pneumonia also can present with diffuse ground glass but her ct appears to be apical predominant which would be atypical; also no peripheral eosinophilia, fever, effusions or inflammatory symptoms to suggest eosinophilic disease.   - Auto immune serologies, IgE level, hypersensitivity panel   - Plan for bronchoscopy Friday with BAL and transbronchial biopsy to evaluate for cellular HP  - ambulate without oxygen to determine need for home O2 given ILD

## 2020-09-24 NOTE — CONSULT NOTE ADULT - ASSESSMENT
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

## 2020-09-25 ENCOUNTER — RESULT REVIEW (OUTPATIENT)
Age: 60
End: 2020-09-25

## 2020-09-25 DIAGNOSIS — J84.9 INTERSTITIAL PULMONARY DISEASE, UNSPECIFIED: ICD-10-CM

## 2020-09-25 LAB
ANA PAT FLD IF-IMP: ABNORMAL
ANA TITR SER: ABNORMAL
ANION GAP SERPL CALC-SCNC: 9 MMOL/L — SIGNIFICANT CHANGE UP (ref 5–17)
B PERT IGG+IGM PNL SER: SIGNIFICANT CHANGE UP
B PERT IGG+IGM PNL SER: SIGNIFICANT CHANGE UP
BLD GP AB SCN SERPL QL: NEGATIVE — SIGNIFICANT CHANGE UP
BUN SERPL-MCNC: 18 MG/DL — SIGNIFICANT CHANGE UP (ref 7–23)
CALCIUM SERPL-MCNC: 8.7 MG/DL — SIGNIFICANT CHANGE UP (ref 8.4–10.5)
CCP IGG SERPL-ACNC: <8 UNITS — SIGNIFICANT CHANGE UP
CHLORIDE SERPL-SCNC: 106 MMOL/L — SIGNIFICANT CHANGE UP (ref 96–108)
CO2 SERPL-SCNC: 27 MMOL/L — SIGNIFICANT CHANGE UP (ref 22–31)
COLOR FLD: SIGNIFICANT CHANGE UP
COLOR FLD: SIGNIFICANT CHANGE UP
CREAT SERPL-MCNC: 0.61 MG/DL — SIGNIFICANT CHANGE UP (ref 0.5–1.3)
EOSINOPHIL # FLD: 2 % — SIGNIFICANT CHANGE UP
EOSINOPHIL # FLD: 2 % — SIGNIFICANT CHANGE UP
FLUID INTAKE SUBSTANCE CLASS: SIGNIFICANT CHANGE UP
FLUID INTAKE SUBSTANCE CLASS: SIGNIFICANT CHANGE UP
FLUID SEGMENTED GRANULOCYTES: 31 % — SIGNIFICANT CHANGE UP
FLUID SEGMENTED GRANULOCYTES: 45 % — SIGNIFICANT CHANGE UP
GLUCOSE SERPL-MCNC: 111 MG/DL — HIGH (ref 70–99)
GRAM STN FLD: SIGNIFICANT CHANGE UP
HCT VFR BLD CALC: 41.6 % — SIGNIFICANT CHANGE UP (ref 34.5–45)
HGB BLD-MCNC: 13.4 G/DL — SIGNIFICANT CHANGE UP (ref 11.5–15.5)
INR BLD: 0.98 — SIGNIFICANT CHANGE UP (ref 0.88–1.16)
LEGIONELLA AG UR QL: NEGATIVE — SIGNIFICANT CHANGE UP
LYMPHOCYTES # FLD: 23 % — SIGNIFICANT CHANGE UP
LYMPHOCYTES # FLD: 41 % — SIGNIFICANT CHANGE UP
MAGNESIUM SERPL-MCNC: 2 MG/DL — SIGNIFICANT CHANGE UP (ref 1.6–2.6)
MCHC RBC-ENTMCNC: 29.3 PG — SIGNIFICANT CHANGE UP (ref 27–34)
MCHC RBC-ENTMCNC: 32.2 GM/DL — SIGNIFICANT CHANGE UP (ref 32–36)
MCV RBC AUTO: 91 FL — SIGNIFICANT CHANGE UP (ref 80–100)
MONOS+MACROS # FLD: 26 % — SIGNIFICANT CHANGE UP
MONOS+MACROS # FLD: 30 % — SIGNIFICANT CHANGE UP
NRBC # BLD: 0 /100 WBCS — SIGNIFICANT CHANGE UP (ref 0–0)
PLATELET # BLD AUTO: 284 K/UL — SIGNIFICANT CHANGE UP (ref 150–400)
POTASSIUM SERPL-MCNC: 4.5 MMOL/L — SIGNIFICANT CHANGE UP (ref 3.5–5.3)
POTASSIUM SERPL-SCNC: 4.5 MMOL/L — SIGNIFICANT CHANGE UP (ref 3.5–5.3)
PROTHROM AB SERPL-ACNC: 11.8 SEC — SIGNIFICANT CHANGE UP (ref 10.6–13.6)
RBC # BLD: 4.57 M/UL — SIGNIFICANT CHANGE UP (ref 3.8–5.2)
RBC # FLD: 13.8 % — SIGNIFICANT CHANGE UP (ref 10.3–14.5)
RCV VOL RI: 127 /UL — HIGH (ref 0–0)
RCV VOL RI: 144 /UL — HIGH (ref 0–0)
RF+CCP IGG SER-IMP: NEGATIVE — SIGNIFICANT CHANGE UP
RH IG SCN BLD-IMP: POSITIVE — SIGNIFICANT CHANGE UP
SODIUM SERPL-SCNC: 142 MMOL/L — SIGNIFICANT CHANGE UP (ref 135–145)
SPECIMEN SOURCE FLD: SIGNIFICANT CHANGE UP
SPECIMEN SOURCE FLD: SIGNIFICANT CHANGE UP
SPECIMEN SOURCE: SIGNIFICANT CHANGE UP
TOTAL NUCLEATED CELL COUNT, BODY FLUID: 248 /UL — SIGNIFICANT CHANGE UP
TOTAL NUCLEATED CELL COUNT, BODY FLUID: 342 /UL — SIGNIFICANT CHANGE UP
TUBE TYPE: SIGNIFICANT CHANGE UP
TUBE TYPE: SIGNIFICANT CHANGE UP
WBC # BLD: 11.37 K/UL — HIGH (ref 3.8–10.5)
WBC # FLD AUTO: 11.37 K/UL — HIGH (ref 3.8–10.5)

## 2020-09-25 PROCEDURE — 31654 BRONCH EBUS IVNTJ PERPH LES: CPT

## 2020-09-25 PROCEDURE — 99233 SBSQ HOSP IP/OBS HIGH 50: CPT | Mod: 25,GC

## 2020-09-25 PROCEDURE — 31625 BRONCHOSCOPY W/BIOPSY(S): CPT

## 2020-09-25 PROCEDURE — 71045 X-RAY EXAM CHEST 1 VIEW: CPT | Mod: 26

## 2020-09-25 PROCEDURE — 31624 DX BRONCHOSCOPE/LAVAGE: CPT

## 2020-09-25 PROCEDURE — 88112 CYTOPATH CELL ENHANCE TECH: CPT | Mod: 26

## 2020-09-25 PROCEDURE — 88305 TISSUE EXAM BY PATHOLOGIST: CPT | Mod: 26

## 2020-09-25 RX ADMIN — Medication 3 MILLILITER(S): at 16:08

## 2020-09-25 RX ADMIN — Medication 3 MILLILITER(S): at 05:03

## 2020-09-25 RX ADMIN — Medication 3 MILLILITER(S): at 21:46

## 2020-09-25 RX ADMIN — BUDESONIDE AND FORMOTEROL FUMARATE DIHYDRATE 2 PUFF(S): 160; 4.5 AEROSOL RESPIRATORY (INHALATION) at 05:03

## 2020-09-25 RX ADMIN — BUDESONIDE AND FORMOTEROL FUMARATE DIHYDRATE 2 PUFF(S): 160; 4.5 AEROSOL RESPIRATORY (INHALATION) at 18:49

## 2020-09-25 RX ADMIN — Medication 40 MILLIGRAM(S): at 16:08

## 2020-09-25 NOTE — PROGRESS NOTE ADULT - ATTENDING COMMENTS
60 year old female, never smoker with cute hypoxic respiratory failure with Mosaic attenuation on CT chest. Restriction of PFT. History suggestive of hypersensitivity pneumonitis as etiology for current symptoms and CT abnormalities. Patient has history of exposure to mold at home, which is most likely etiology. No occupational exposure. Patient has dog at home for last 8 years.   Plan:  - BAL was done from RML and RUL and TBLB was done from RUL. Cell count from BAL showed 40% lymphocyte. Suggestive of hypersensitivity pneumonitis as etiology.  - Start prednisone 40 mg daily with PCP prophylaxis with Bactrim DS 3 times a week  - Patient told us that her building management is fixing mould in her apartment.  - Patient will follow up with Dr. Brown as outpatient.

## 2020-09-25 NOTE — PROGRESS NOTE ADULT - ATTENDING COMMENTS
patient s/p bronchoscopy today   have spoken to pulmonology team, findings c/w hypersensitivity pneumonitis  will be put on steroid course and tapered outpatient  patient to f/u with pulm outpatient    will need home o2 as continuing to require supplemental o2   wbc 2/2 steroids - no signs of infection    potential d/c this evening if patient tolerating PO well

## 2020-09-25 NOTE — PROGRESS NOTE ADULT - PROBLEM SELECTOR PLAN 1
- Patient presents with acute hypoxic respiratory failure and diffuse ground glass opacities with an apical predominance. The mosaic attenuation which may be caused by vascular or airways disease - subtypes of bronchiolitis.   - Her history and imaging are most suggestive of hypersensitivity pneumonitis. Supporting information include her cyclical worsening on reexposure to mold in her home, response to steroids when hospitalized, improvement in her symptoms when removed from her home (when hospitalized), her CT imaging as well as her PFTs which do not support and obstructive lung disease like asthma.  - Alternative diagnoses of her mosaic attenuation include small-airways disease, vascular lung diseases or infiltrative lung diseases.   - Unlikely to represent pHTN or CTEPH given no history of VTE given no other evidence of pHTN; normal pa diameter and RV:LV ratio. May also represent viral bronchiolitis, bronchiolitis obliterans, RB-ILD (non smoker) but these are less likely, constrictive bronchiolitis. Sarcoid also possible but no hilar lymphadenopathy makes less likely. Acute idiopathic eosinophilic pneumonia also can present with diffuse ground glass but her ct appears to be apical predominant which would be atypical; also no peripheral eosinophilia, fever, effusions or inflammatory symptoms to suggest eosinophilic disease.   - Pending serologies. RF mildly elevated consistent with hypersensitivity pneumonitis.   - Bronchoscopy today   - ambulate without oxygen to determine need for home O2 given ILD.

## 2020-09-25 NOTE — PROGRESS NOTE ADULT - PROBLEM SELECTOR PLAN 3
- can continue breo and bronchodilators.   - not currently in exacerbation, hold steroids for bronchoscopy

## 2020-09-25 NOTE — PROGRESS NOTE ADULT - PROBLEM SELECTOR PLAN 1
Pt presents w/ 2 weeks SOB, chest tightness and nonproductive cough. Pt also endorses diarrhea x2 wks. Mentioned home bathroom has black mold along ceiling and walls. COVID negative. CXR shows b/l interstitial opacities. CT chest shows diffuse mosaic attenuation bilaterally with slight upper lung predominance. History and presentation suggestive more of hypersensitivity pneumonitis v asthma exacerbation.  - PAULA negative, positive for RF  - RVP negative  - f/u hypersensitivity panel and legionella ag  - c/w duonebs and albuterol  - F/u bronchoscopy today Pt presents w/ 2 weeks SOB, chest tightness and nonproductive cough. Pt also endorses diarrhea x2 wks. Mentioned home bathroom has black mold along ceiling and walls. COVID negative. CXR shows b/l interstitial opacities. CT chest shows diffuse mosaic attenuation bilaterally with slight upper lung predominance. History and presentation suggestive more of hypersensitivity pneumonitis v asthma exacerbation.  - PAULA negative, positive for RF  - RVP negative  - f/u hypersensitivity panel, anti-CCP, and legionella ag  - c/w duonebs and albuterol  - F/u bronchoscopy today  - O2 test to be done today  - Currently on 4-6L NC; will most likely need supplement O2 upon discharge Pt presents w/ 2 weeks SOB, chest tightness and nonproductive cough. Pt also endorses diarrhea x2 wks. Mentioned home bathroom has black mold along ceiling and walls. COVID negative. CXR shows b/l interstitial opacities. CT chest shows diffuse mosaic attenuation bilaterally with slight upper lung predominance. History and presentation suggestive more of hypersensitivity pneumonitis v asthma exacerbation.  - 86% SpO2 on RA. Requires 6L of O2 to maintain O2 Sat above 95% and will require home O2.  - PAULA negative, positive for RF  - RVP negative  - f/u hypersensitivity panel, anti-CCP, and legionella ag  - c/w duonebs and albuterol  - F/u bronchoscopy today  - O2 test to be done today  - Currently on 4-6L NC; will most likely need supplement O2 upon discharge

## 2020-09-25 NOTE — PROGRESS NOTE ADULT - PROBLEM SELECTOR PLAN 2
Pt has hx of asthma w/ multiple hospitalizations (last admitted July 2020). No hx of intubations. On home albuterol and breo. Past PFTs suggestive of restrictive rather than obstructive pattern. Consider HP in a patient with underlying asthma  -c/w duonebs and albuterol

## 2020-09-25 NOTE — CHART NOTE - NSCHARTNOTEFT_GEN_A_CORE
Indication: hypoxic respiratory failure     Pre-op Dx: hypersensitivity pneumonitis     Preop medication: general anesthesia     Xray Findings: diffuse intersitial lung disease     Findings:      Specimens:  BAL RML   BAL RUL  TBBx RUL Indication: hypoxic respiratory failure     Pre-op Dx: hypersensitivity pneumonitis     Preop medication: general anesthesia     Xray Findings: diffuse intersitial lung disease     Procedure and findings:     After the risks, benefits, and alternatives to the procedure were explained,  informed consent was obtained.  The patient was anesthetized with general anesthesia and endotracheal intubation performed by attending anesthesiologist. The Olympus Q-190 bronchoscope was introduced through the endotracheal tube, where xylocaine was instilled on the good and bilateral mainstem bronchi. The tracheao-bronchial tree was then examined to the sub segmental level bilaterally. Medial segment of the right middle lobe was isolated and 100 cc of saline was instilled in 50 cc aliquots with return of approximately 60 cc. The anterior segment of the right upper lobe was isolated and 50 cc of saline were instilled with return of 30 cc. Following bronchoalveolar lavage the anterior subsegment of the posterior segment of the right upper lobe was isolated and transbronchial biopsy was performed under fluoroscopic guidance. Three biopsies were taken. Following the first biopsy cold saline was instilled with prompt hemostasis achieved. Bronchoscope was withdrawn and the patient was extubated to recover in the recovery room. Post operative chest was performed at bedside.     Sharp good noted upon insertion of the bronchoscope. Airways were noted to be hyperemic with edema diffusely. Left sided bronchial tree was more edematous relative to the right side. Mucinous secretions noted in bilateral airways which were aspirated for culture and gram stain. Bronchoalveolar lavage was performed with brisk return of cellular fluid with sputum. No blood was aspirated during bronchoalveolar lavage. Small mucosal irritation due to suction trauma but no overt bleeding. Biopsy was taken at the anterior subsegment of the posterior segment of the right upper lobe. Following initial biopsy small amount of bleeding noted with achievement of hemostasis following cold saline lavage and wedging of bronchoscope. Subsequent two biopsies at the same site without bleeding. No clinically significant bleeding was observed following biopsies. The instrument was then slowly withdrawn and removed. Post procedure chest xray whithout evidence of pneumothorax.     Specimens:  BAL RML   BAL RUL  TBBx RUL    Impression: Hypersensitivity pneumonitis     Recommendations:   - Prednisone 40 mg qd  - f/u hypersensitivity panel  - supplemental oxygen to maintain saturation above 92%   - Ambulate with pulse oximetry if patient is saturating well on room air at rest Indication: hypoxic respiratory failure     Pre-op Dx: hypersensitivity pneumonitis     Preop medication: general anesthesia     Xray Findings: diffuse intersitial lung disease     Procedure and findings:     After the risks, benefits, and alternatives to the procedure were explained,  informed consent was obtained.  The patient was anesthetized with general anesthesia and endotracheal intubation performed by attending anesthesiologist. The Olympus Q-190 bronchoscope was introduced through the endotracheal tube, where xylocaine was instilled on the good and bilateral mainstem bronchi. The tracheao-bronchial tree was then examined to the sub segmental level bilaterally. Medial segment of the right middle lobe was isolated and 100 cc of saline was instilled in 50 cc aliquots with return of approximately 60 cc. The anterior segment of the right upper lobe was isolated and 50 cc of saline were instilled with return of 30 cc. Following bronchoalveolar lavage the anterior subsegment of the posterior segment of the right upper lobe was isolated and transbronchial biopsy was performed under fluoroscopic guidance. Three biopsies were taken. Following the first biopsy cold saline was instilled with prompt hemostasis achieved. Bronchoscope was withdrawn and the patient was extubated to recover in the recovery room. Post operative chest was performed at bedside.     Sharp good noted upon insertion of the bronchoscope. Airways were noted to be hyperemic with edema diffusely. Left sided bronchial tree was more edematous relative to the right side. Mucinous secretions noted in bilateral airways which were aspirated for culture and gram stain. Bronchoalveolar lavage was performed with brisk return of cellular fluid with sputum. No blood was aspirated during bronchoalveolar lavage. Small mucosal irritation due to suction trauma but no overt bleeding. Biopsy was taken at the anterior subsegment of the posterior segment of the right upper lobe. Following initial biopsy small amount of bleeding noted with achievement of hemostasis following cold saline lavage and wedging of bronchoscope. Subsequent two biopsies at the same site without bleeding. No clinically significant bleeding was observed following biopsies. The instrument was then slowly withdrawn and removed. Post procedure chest x-ray without evidence of pneumothorax.     Specimens:  BAL RML   BAL RUL  TBBx RUL    Impression: Hypersensitivity pneumonitis     Recommendations:   - Prednisone 40 mg qd  - f/u hypersensitivity panel  - supplemental oxygen to maintain saturation above 92%   - Ambulate with pulse oximetry if patient is saturating well on room air at rest

## 2020-09-25 NOTE — PROGRESS NOTE ADULT - PROBLEM SELECTOR PLAN 3
Pt met 2/4 SIRS criterial on admission (H 115, WBC 14.3). UA negative. CT chest demonstrates diffuse mosaic attenuation bilaterally with slight upper lung predominance. Likely due to HP. Low suspicion for infectious process.  -see plan for SOB and asthma  - Procal neg. RVP (-)  - Blood cultures negative at 24 hours

## 2020-09-26 LAB
A FUMIGATUS IGG SER QL: 9.5 MCG/ML — SIGNIFICANT CHANGE UP
ALTERNARIA TENUIS/ALTERNATA IGG: 7.3 MCG/ML — SIGNIFICANT CHANGE UP
AUREOBASIDIUM PULLULANS IGG: 5.8 MCG/ML — SIGNIFICANT CHANGE UP
MICROPOLYSPORA FAENI IGG: 6.4 MCG/ML — HIGH
NIGHT BLUE STAIN TISS: SIGNIFICANT CHANGE UP
PENICILLIUM CHRYSOGENUM/NOTATUM IGG: 11.2 MCG/ML — SIGNIFICANT CHANGE UP
PHOMA BETAE IGG: 5.1 MCG/ML — SIGNIFICANT CHANGE UP
SPECIMEN SOURCE: SIGNIFICANT CHANGE UP
T VULGARIS IGG SER QL: 12 MCG/ML — SIGNIFICANT CHANGE UP
TRICHODERMA VIRIDE IGG: 6.1 MCG/ML — SIGNIFICANT CHANGE UP

## 2020-09-26 PROCEDURE — 99233 SBSQ HOSP IP/OBS HIGH 50: CPT | Mod: GC

## 2020-09-26 RX ADMIN — BUDESONIDE AND FORMOTEROL FUMARATE DIHYDRATE 2 PUFF(S): 160; 4.5 AEROSOL RESPIRATORY (INHALATION) at 18:55

## 2020-09-26 RX ADMIN — Medication 40 MILLIGRAM(S): at 16:15

## 2020-09-26 RX ADMIN — Medication 3 MILLILITER(S): at 05:40

## 2020-09-26 RX ADMIN — Medication 3 MILLILITER(S): at 21:54

## 2020-09-26 RX ADMIN — BUDESONIDE AND FORMOTEROL FUMARATE DIHYDRATE 2 PUFF(S): 160; 4.5 AEROSOL RESPIRATORY (INHALATION) at 05:40

## 2020-09-26 RX ADMIN — Medication 3 MILLILITER(S): at 16:15

## 2020-09-26 RX ADMIN — Medication 3 MILLILITER(S): at 10:28

## 2020-09-26 NOTE — PROGRESS NOTE ADULT - PROBLEM SELECTOR PLAN 1
Pt presents w/ 2 weeks SOB, chest tightness and nonproductive cough. Pt also endorses diarrhea x2 wks. Mentioned home bathroom has black mold along ceiling and walls. COVID negative. CXR shows b/l interstitial opacities. CT chest shows diffuse mosaic attenuation bilaterally with slight upper lung predominance. History and presentation suggestive more of hypersensitivity pneumonitis v asthma exacerbation.  - 86% SpO2 on RA. Requires 6L of O2 to maintain O2 Sat above 95% and will require home O2.  - PAULA negative, positive for RF  - RVP negative  - f/u hypersensitivity panel, anti-CCP, and legionella ag  - c/w duonebs and albuterol  - Will require 6L home O2, prescription sent, dc pending insurance coverage

## 2020-09-26 NOTE — PROGRESS NOTE ADULT - ATTENDING COMMENTS
60 year old female, never smoker with acute hypoxic respiratory failure (on 3l/min oxygen) with hypersensitivity pneumonitis.  Restriction of PFT. History suggestive of hypersensitivity pneumonitis as etiology for current symptoms and CT abnormalities. Patient has history of exposure to mold at home, which is most likely etiology. No occupational exposure. Patient has dog at home for last 8 years.   Plan:  - Biopsy from lung is still pending.   - Start prednisone 40 mg daily with PCP prophylaxis with Bactrim DS 3 times a week  - Patient told us that her building management is fixing mould in her apartment.  - Patient will follow up with Dr. Brown as outpatient.

## 2020-09-26 NOTE — PROGRESS NOTE ADULT - PROBLEM SELECTOR PLAN 1
- Patient presents with acute hypoxic respiratory failure and diffuse ground glass opacities with an apical predominance. The mosaic attenuation which may be caused by vascular or airways disease - subtypes of bronchiolitis.   - Her history and imaging are most suggestive of hypersensitivity pneumonitis. Supporting information include her cyclical worsening on reexposure to mold in her home, response to steroids when hospitalized, improvement in her symptoms when removed from her home (when hospitalized), her CT imaging as well as her PFTs which do not support and obstructive lung disease like asthma. Lastly, her Bronchoscopy BAL with >40% lymphocytes is also suggestive.    - Alternative diagnoses of her mosaic attenuation include small-airways disease, vascular lung diseases or infiltrative lung diseases.   - Unlikely to represent pHTN or CTEPH given no history of VTE given no other evidence of pHTN; normal pa diameter and RV:LV ratio. May also represent viral bronchiolitis, bronchiolitis obliterans, RB-ILD (non smoker) but these are less likely, constrictive bronchiolitis. Sarcoid also possible but no hilar lymphadenopathy makes less likely. Acute idiopathic eosinophilic pneumonia also can present with diffuse ground glass but her ct appears to be apical predominant which would be atypical; also no peripheral eosinophilia, fever, effusions or inflammatory symptoms to suggest eosinophilic disease.   - Pending serologies. RF mildly elevated consistent with hypersensitivity pneumonitis.   - Will require home O2 for ambulation upon D/C  - Pulmonary outpatient follow-up scheduled with Dr. Brown

## 2020-09-27 LAB
CULTURE RESULTS: NO GROWTH — SIGNIFICANT CHANGE UP
CULTURE RESULTS: NO GROWTH — SIGNIFICANT CHANGE UP
CULTURE RESULTS: SIGNIFICANT CHANGE UP
SPECIMEN SOURCE: SIGNIFICANT CHANGE UP

## 2020-09-27 PROCEDURE — 99232 SBSQ HOSP IP/OBS MODERATE 35: CPT | Mod: GC

## 2020-09-27 RX ORDER — ONDANSETRON 8 MG/1
4 TABLET, FILM COATED ORAL ONCE
Refills: 0 | Status: COMPLETED | OUTPATIENT
Start: 2020-09-27 | End: 2020-09-27

## 2020-09-27 RX ORDER — ACETAMINOPHEN 500 MG
650 TABLET ORAL EVERY 6 HOURS
Refills: 0 | Status: DISCONTINUED | OUTPATIENT
Start: 2020-09-27 | End: 2020-09-28

## 2020-09-27 RX ADMIN — Medication 3 MILLILITER(S): at 17:33

## 2020-09-27 RX ADMIN — BUDESONIDE AND FORMOTEROL FUMARATE DIHYDRATE 2 PUFF(S): 160; 4.5 AEROSOL RESPIRATORY (INHALATION) at 06:14

## 2020-09-27 RX ADMIN — Medication 3 MILLILITER(S): at 13:02

## 2020-09-27 RX ADMIN — Medication 40 MILLIGRAM(S): at 15:34

## 2020-09-27 RX ADMIN — BUDESONIDE AND FORMOTEROL FUMARATE DIHYDRATE 2 PUFF(S): 160; 4.5 AEROSOL RESPIRATORY (INHALATION) at 17:33

## 2020-09-27 RX ADMIN — ONDANSETRON 4 MILLIGRAM(S): 8 TABLET, FILM COATED ORAL at 12:58

## 2020-09-27 RX ADMIN — Medication 3 MILLILITER(S): at 06:14

## 2020-09-27 NOTE — PROGRESS NOTE ADULT - PROBLEM SELECTOR PLAN 1
Pulm following CP, and legionella ag  - c/w duonebs prednisone and symbicort  - Will require 6L home O2, prescription sent, dc pending insurance coverage

## 2020-09-28 ENCOUNTER — TRANSCRIPTION ENCOUNTER (OUTPATIENT)
Age: 60
End: 2020-09-28

## 2020-09-28 VITALS
OXYGEN SATURATION: 95 % | HEART RATE: 83 BPM | SYSTOLIC BLOOD PRESSURE: 141 MMHG | RESPIRATION RATE: 17 BRPM | DIASTOLIC BLOOD PRESSURE: 92 MMHG | TEMPERATURE: 98 F

## 2020-09-28 LAB
ANION GAP SERPL CALC-SCNC: 11 MMOL/L — SIGNIFICANT CHANGE UP (ref 5–17)
ANION GAP SERPL CALC-SCNC: 11 MMOL/L — SIGNIFICANT CHANGE UP (ref 5–17)
BASOPHILS # BLD AUTO: 0.02 K/UL — SIGNIFICANT CHANGE UP (ref 0–0.2)
BASOPHILS NFR BLD AUTO: 0.4 % — SIGNIFICANT CHANGE UP (ref 0–2)
BUN SERPL-MCNC: 23 MG/DL — SIGNIFICANT CHANGE UP (ref 7–23)
BUN SERPL-MCNC: 8 MG/DL — SIGNIFICANT CHANGE UP (ref 7–23)
CALCIUM SERPL-MCNC: 8.8 MG/DL — SIGNIFICANT CHANGE UP (ref 8.4–10.5)
CALCIUM SERPL-MCNC: 9.3 MG/DL — SIGNIFICANT CHANGE UP (ref 8.4–10.5)
CHLORIDE SERPL-SCNC: 105 MMOL/L — SIGNIFICANT CHANGE UP (ref 96–108)
CHLORIDE SERPL-SCNC: 99 MMOL/L — SIGNIFICANT CHANGE UP (ref 96–108)
CO2 SERPL-SCNC: 23 MMOL/L — SIGNIFICANT CHANGE UP (ref 22–31)
CO2 SERPL-SCNC: 29 MMOL/L — SIGNIFICANT CHANGE UP (ref 22–31)
CREAT SERPL-MCNC: 0.59 MG/DL — SIGNIFICANT CHANGE UP (ref 0.5–1.3)
CREAT SERPL-MCNC: 1.31 MG/DL — HIGH (ref 0.5–1.3)
CULTURE RESULTS: SIGNIFICANT CHANGE UP
CULTURE RESULTS: SIGNIFICANT CHANGE UP
EOSINOPHIL # BLD AUTO: 0.24 K/UL — SIGNIFICANT CHANGE UP (ref 0–0.5)
EOSINOPHIL NFR BLD AUTO: 4.8 % — SIGNIFICANT CHANGE UP (ref 0–6)
GLUCOSE SERPL-MCNC: 106 MG/DL — HIGH (ref 70–99)
GLUCOSE SERPL-MCNC: 330 MG/DL — HIGH (ref 70–99)
HCT VFR BLD CALC: 35.5 % — SIGNIFICANT CHANGE UP (ref 34.5–45)
HGB BLD-MCNC: 11.4 G/DL — LOW (ref 11.5–15.5)
IMM GRANULOCYTES NFR BLD AUTO: 1.2 % — SIGNIFICANT CHANGE UP (ref 0–1.5)
LYMPHOCYTES # BLD AUTO: 1.22 K/UL — SIGNIFICANT CHANGE UP (ref 1–3.3)
LYMPHOCYTES # BLD AUTO: 24.3 % — SIGNIFICANT CHANGE UP (ref 13–44)
MAGNESIUM SERPL-MCNC: 2.1 MG/DL — SIGNIFICANT CHANGE UP (ref 1.6–2.6)
MCHC RBC-ENTMCNC: 26.5 PG — LOW (ref 27–34)
MCHC RBC-ENTMCNC: 32.1 GM/DL — SIGNIFICANT CHANGE UP (ref 32–36)
MCV RBC AUTO: 82.6 FL — SIGNIFICANT CHANGE UP (ref 80–100)
MONOCYTES # BLD AUTO: 0.66 K/UL — SIGNIFICANT CHANGE UP (ref 0–0.9)
MONOCYTES NFR BLD AUTO: 13.1 % — SIGNIFICANT CHANGE UP (ref 2–14)
NEUTROPHILS # BLD AUTO: 2.83 K/UL — SIGNIFICANT CHANGE UP (ref 1.8–7.4)
NEUTROPHILS NFR BLD AUTO: 56.2 % — SIGNIFICANT CHANGE UP (ref 43–77)
NON-GYNECOLOGICAL CYTOLOGY STUDY: SIGNIFICANT CHANGE UP
NON-GYNECOLOGICAL CYTOLOGY STUDY: SIGNIFICANT CHANGE UP
NRBC # BLD: 0 /100 WBCS — SIGNIFICANT CHANGE UP (ref 0–0)
PHOSPHATE SERPL-MCNC: 3.9 MG/DL — SIGNIFICANT CHANGE UP (ref 2.5–4.5)
PLATELET # BLD AUTO: 198 K/UL — SIGNIFICANT CHANGE UP (ref 150–400)
POTASSIUM SERPL-MCNC: 4.1 MMOL/L — SIGNIFICANT CHANGE UP (ref 3.5–5.3)
POTASSIUM SERPL-MCNC: 4.1 MMOL/L — SIGNIFICANT CHANGE UP (ref 3.5–5.3)
POTASSIUM SERPL-SCNC: 4.1 MMOL/L — SIGNIFICANT CHANGE UP (ref 3.5–5.3)
POTASSIUM SERPL-SCNC: 4.1 MMOL/L — SIGNIFICANT CHANGE UP (ref 3.5–5.3)
RBC # BLD: 4.3 M/UL — SIGNIFICANT CHANGE UP (ref 3.8–5.2)
RBC # FLD: 14.3 % — SIGNIFICANT CHANGE UP (ref 10.3–14.5)
SODIUM SERPL-SCNC: 139 MMOL/L — SIGNIFICANT CHANGE UP (ref 135–145)
SODIUM SERPL-SCNC: 139 MMOL/L — SIGNIFICANT CHANGE UP (ref 135–145)
SPECIMEN SOURCE: SIGNIFICANT CHANGE UP
SPECIMEN SOURCE: SIGNIFICANT CHANGE UP
WBC # BLD: 5.03 K/UL — SIGNIFICANT CHANGE UP (ref 3.8–10.5)
WBC # FLD AUTO: 5.03 K/UL — SIGNIFICANT CHANGE UP (ref 3.8–10.5)

## 2020-09-28 PROCEDURE — 88112 CYTOPATH CELL ENHANCE TECH: CPT

## 2020-09-28 PROCEDURE — 71045 X-RAY EXAM CHEST 1 VIEW: CPT

## 2020-09-28 PROCEDURE — 80053 COMPREHEN METABOLIC PANEL: CPT

## 2020-09-28 PROCEDURE — 83880 ASSAY OF NATRIURETIC PEPTIDE: CPT

## 2020-09-28 PROCEDURE — 87449 NOS EACH ORGANISM AG IA: CPT

## 2020-09-28 PROCEDURE — 96375 TX/PRO/DX INJ NEW DRUG ADDON: CPT

## 2020-09-28 PROCEDURE — 89051 BODY FLUID CELL COUNT: CPT

## 2020-09-28 PROCEDURE — 86200 CCP ANTIBODY: CPT

## 2020-09-28 PROCEDURE — 83605 ASSAY OF LACTIC ACID: CPT

## 2020-09-28 PROCEDURE — 94640 AIRWAY INHALATION TREATMENT: CPT

## 2020-09-28 PROCEDURE — 87594 PNEUMCYSTS JIROVECII AMP PRB: CPT

## 2020-09-28 PROCEDURE — 99285 EMERGENCY DEPT VISIT HI MDM: CPT | Mod: 25

## 2020-09-28 PROCEDURE — 87206 SMEAR FLUORESCENT/ACID STAI: CPT

## 2020-09-28 PROCEDURE — 36415 COLL VENOUS BLD VENIPUNCTURE: CPT

## 2020-09-28 PROCEDURE — 85027 COMPLETE CBC AUTOMATED: CPT

## 2020-09-28 PROCEDURE — 87015 SPECIMEN INFECT AGNT CONCNTJ: CPT

## 2020-09-28 PROCEDURE — 87070 CULTURE OTHR SPECIMN AEROBIC: CPT

## 2020-09-28 PROCEDURE — 81003 URINALYSIS AUTO W/O SCOPE: CPT

## 2020-09-28 PROCEDURE — 83735 ASSAY OF MAGNESIUM: CPT

## 2020-09-28 PROCEDURE — 80048 BASIC METABOLIC PNL TOTAL CA: CPT

## 2020-09-28 PROCEDURE — 84145 PROCALCITONIN (PCT): CPT

## 2020-09-28 PROCEDURE — 86900 BLOOD TYPING SEROLOGIC ABO: CPT

## 2020-09-28 PROCEDURE — 0225U NFCT DS DNA&RNA 21 SARSCOV2: CPT

## 2020-09-28 PROCEDURE — 87116 MYCOBACTERIA CULTURE: CPT

## 2020-09-28 PROCEDURE — 87102 FUNGUS ISOLATION CULTURE: CPT

## 2020-09-28 PROCEDURE — 87040 BLOOD CULTURE FOR BACTERIA: CPT

## 2020-09-28 PROCEDURE — 84100 ASSAY OF PHOSPHORUS: CPT

## 2020-09-28 PROCEDURE — 85610 PROTHROMBIN TIME: CPT

## 2020-09-28 PROCEDURE — 96361 HYDRATE IV INFUSION ADD-ON: CPT

## 2020-09-28 PROCEDURE — 96374 THER/PROPH/DIAG INJ IV PUSH: CPT

## 2020-09-28 PROCEDURE — 86001 ALLERGEN SPECIFIC IGG: CPT

## 2020-09-28 PROCEDURE — 86901 BLOOD TYPING SEROLOGIC RH(D): CPT

## 2020-09-28 PROCEDURE — 99239 HOSP IP/OBS DSCHRG MGMT >30: CPT | Mod: GC

## 2020-09-28 PROCEDURE — 86431 RHEUMATOID FACTOR QUANT: CPT

## 2020-09-28 PROCEDURE — 93005 ELECTROCARDIOGRAM TRACING: CPT

## 2020-09-28 PROCEDURE — 71250 CT THORAX DX C-: CPT

## 2020-09-28 PROCEDURE — 84484 ASSAY OF TROPONIN QUANT: CPT

## 2020-09-28 PROCEDURE — 82785 ASSAY OF IGE: CPT

## 2020-09-28 PROCEDURE — 88305 TISSUE EXAM BY PATHOLOGIST: CPT

## 2020-09-28 PROCEDURE — 87086 URINE CULTURE/COLONY COUNT: CPT

## 2020-09-28 PROCEDURE — U0003: CPT

## 2020-09-28 PROCEDURE — 86850 RBC ANTIBODY SCREEN: CPT

## 2020-09-28 PROCEDURE — 86038 ANTINUCLEAR ANTIBODIES: CPT

## 2020-09-28 PROCEDURE — 85025 COMPLETE CBC W/AUTO DIFF WBC: CPT

## 2020-09-28 PROCEDURE — 85379 FIBRIN DEGRADATION QUANT: CPT

## 2020-09-28 PROCEDURE — 85730 THROMBOPLASTIN TIME PARTIAL: CPT

## 2020-09-28 RX ORDER — AZTREONAM 2 G
1 VIAL (EA) INJECTION
Qty: 26 | Refills: 0
Start: 2020-09-28 | End: 2020-11-26

## 2020-09-28 RX ORDER — POLYETHYLENE GLYCOL 3350 17 G/17G
17 POWDER, FOR SOLUTION ORAL ONCE
Refills: 0 | Status: COMPLETED | OUTPATIENT
Start: 2020-09-28 | End: 2020-09-28

## 2020-09-28 RX ORDER — SODIUM CHLORIDE 9 MG/ML
500 INJECTION, SOLUTION INTRAVENOUS ONCE
Refills: 0 | Status: COMPLETED | OUTPATIENT
Start: 2020-09-28 | End: 2020-09-28

## 2020-09-28 RX ADMIN — Medication 3 MILLILITER(S): at 17:15

## 2020-09-28 RX ADMIN — BUDESONIDE AND FORMOTEROL FUMARATE DIHYDRATE 2 PUFF(S): 160; 4.5 AEROSOL RESPIRATORY (INHALATION) at 06:43

## 2020-09-28 RX ADMIN — POLYETHYLENE GLYCOL 3350 17 GRAM(S): 17 POWDER, FOR SOLUTION ORAL at 17:15

## 2020-09-28 RX ADMIN — Medication 3 MILLILITER(S): at 00:40

## 2020-09-28 RX ADMIN — Medication 3 MILLILITER(S): at 11:27

## 2020-09-28 RX ADMIN — SODIUM CHLORIDE 1000 MILLILITER(S): 9 INJECTION, SOLUTION INTRAVENOUS at 10:06

## 2020-09-28 RX ADMIN — BUDESONIDE AND FORMOTEROL FUMARATE DIHYDRATE 2 PUFF(S): 160; 4.5 AEROSOL RESPIRATORY (INHALATION) at 17:15

## 2020-09-28 RX ADMIN — Medication 40 MILLIGRAM(S): at 16:27

## 2020-09-28 RX ADMIN — Medication 3 MILLILITER(S): at 06:43

## 2020-09-28 RX ADMIN — Medication 1 TABLET(S): at 10:06

## 2020-09-28 NOTE — PROGRESS NOTE ADULT - PROBLEM SELECTOR PROBLEM 2
Acute respiratory failure with hypoxia
Acute respiratory failure with hypoxia
Asthma
Shortness of breath
Asthma

## 2020-09-28 NOTE — PROGRESS NOTE ADULT - ASSESSMENT
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
61 yo F w/ a PMHx of asthma (no hx of intubations, multiple hospitalizations, last admitted in July 2020) p/w SOB, chest tightness, and dry cough x2wks. Patient admitted to [facility] for further management of care. History of chronic black mold exposure, recent PFTs (restrictive pattern), and physical exam findings suggestive of hypersensitivity pneumonitis.
59 yo F w/ a PMHx of asthma (no hx of intubations, multiple hospitalizations, last admitted in July 2020) p/w SOB, chest tightness, and dry cough x2wks. Patient admitted to [facility] for further management of care. History of chronic mold exposure, recent PFTs (restrictive pattern), and physical exam findings suggestive of hypersensitivity pneumonitis v asthma exacerbation.
59 yo F w/ a PMHx of asthma (no hx of intubations, multiple hospitalizations, last admitted in July 2020) p/w SOB, chest tightness, and dry cough x2wks. Patient admitted to [facility] for further management of care. History of chronic black mold exposure, recent PFTs (restrictive pattern), and physical exam findings suggestive of hypersensitivity pneumonitis.
61 yo F w/ a PMHx of asthma (no hx of intubations, multiple hospitalizations, last admitted in July 2020) p/w SOB, chest tightness, and dry cough x2wks. Patient admitted to [facility] for further management of care. History of chronic black mold exposure, recent PFTs (restrictive pattern), and physical exam findings suggestive of hypersensitivity pneumonitis.
61 yo F w/ a PMHx of asthma (no hx of intubations, multiple hospitalizations, last admitted in July 2020) p/w SOB, chest tightness, and dry cough x2wks. Patient admitted to [facility] for further management of care. History of chronic black mold exposure, recent PFTs (restrictive pattern), and physical exam findings suggestive of hypersensitivity pneumonitis.

## 2020-09-28 NOTE — PROGRESS NOTE ADULT - PROBLEM SELECTOR PROBLEM 3
Asthma, unspecified asthma severity, unspecified whether complicated, unspecified whether persistent
Asthma, unspecified asthma severity, unspecified whether complicated, unspecified whether persistent
SIRS (systemic inflammatory response syndrome)
Asthma
SIRS (systemic inflammatory response syndrome)

## 2020-09-28 NOTE — DISCHARGE NOTE NURSING/CASE MANAGEMENT/SOCIAL WORK - PATIENT PORTAL LINK FT
You can access the FollowMyHealth Patient Portal offered by [facility] by registering at the following website: [email or web address]. By joining [email or web address]’s FollowMyHealth portal, you will also be able to view your health information using other applications (apps) compatible with our system.

## 2020-09-28 NOTE — PROGRESS NOTE ADULT - PROBLEM SELECTOR PLAN 4
[facility]  [address]  [facility] [address]  Phone: [phone]    Name:  Ely Cronin  Current Date: May 29, 2017  : 1987  MRN: [id number]   IRMA: [id number]    Visit Date: 2017  Address: [address] [facility] [address]  Phone: [phone]    Primary Care Provider     Name: No Pcp    Phone: None    The staff of [facility] would like to thank you for allowing us to assist you with your healthcare needs. The following includes patient education materials and information on how best to care for your illness/injury at home and when to see a physician. If you need to locate a Doctor or clinic close to you, please call the Doctor Referral Service at [phone]. The Service is available Monday through Thursday from 8 AM to 8 PM and  from 8 AM to 4 PM.    Patients Please Note: If further time off is required, or a medical clearance to return to work is required, it must be obtained through your primary physician.  Return to work clearances and extensions of \"Time-Off\" will not be given by the Emergency Department.     We hope that you leave our Emergency Department believing that we provided you with very good care.   Your Opinion Matters To Us  If you receive a patient satisfaction survey in the mail, please complete and return it in the postage-paid envelope.  We truly value and appreciate your feedback.  Emergency Department Care Providers   Physician:Larry Jeffery, *    Advanced Practice Provider:  No providers found     RN_________________ ED Tech__________________ Clerical_________________        
Soft tissue nodule in left breast lower outer quadrant identified on CT chest. Unchanged.  -f/u outpatient mammography; as per patient, she had ultrasound done in august

## 2020-09-28 NOTE — PROGRESS NOTE ADULT - SUBJECTIVE AND OBJECTIVE BOX
MARIELA HAMZAH  60y  Female      Patient is a 60y old  Female who presents with a chief complaint of SOB (26 Sep 2020 15:06)  ON: SALOMÓN  Subjective: feels SOB when walking but improved from prior        PAST MEDICAL/SURGICAL HISTORY  PAST MEDICAL & SURGICAL HISTORY:  Fibroid    Asthma    S/P  section    S/P cholecystectomy        REVIEW OF SYSTEMS:  CONSTITUTIONAL: No fever, weight loss, or fatigue  EYES: No eye pain, visual disturbances, or discharge  ENMT:  No difficulty hearing, tinnitus, vertigo; No sinus or throat pain  NECK: No pain or stiffness  BREASTS: No pain, masses, or nipple discharge  RESPIRATORY: No cough, wheezing, chills or hemoptysis; +shortness of breath  CARDIOVASCULAR: No chest pain, palpitations, dizziness, or leg swelling  GASTROINTESTINAL: No abdominal or epigastric pain. No nausea, vomiting, or hematemesis; No diarrhea or constipation. No melena or hematochezia.  GENITOURINARY: No dysuria, frequency, hematuria, or incontinence  NEUROLOGICAL: No headaches, memory loss, loss of strength, numbness, or tremors  SKIN: No itching, burning, rashes, or lesions   LYMPH NODES: No enlarged glands  ENDOCRINE: No heat or cold intolerance; No hair loss  MUSCULOSKELETAL: No joint pain or swelling; No muscle, back, or extremity pain  PSYCHIATRIC: No depression, anxiety, mood swings, or difficulty sleeping  HEME/LYMPH: No easy bruising, or bleeding gums  ALLERY AND IMMUNOLOGIC: No hives or eczema    T(C): 36.6 (20 @ 09:37), Max: 36.7 (20 @ 22:02)  HR: 75 (20 @ 09:37) (75 - 100)  BP: 135/71 (20 @ 09:37) (134/80 - 143/88)  RR: 17 (20 @ 09:37) (16 - 18)  SpO2: 96% (20 @ 09:37) (94% - 98%)  Wt(kg): --Vital Signs Last 24 Hrs  T(C): 36.6 (27 Sep 2020 09:37), Max: 36.7 (26 Sep 2020 22:02)  T(F): 97.8 (27 Sep 2020 09:37), Max: 98 (26 Sep 2020 22:02)  HR: 75 (27 Sep 2020 09:37) (75 - 100)  BP: 135/71 (27 Sep 2020 09:37) (134/80 - 143/88)  BP(mean): --  RR: 17 (27 Sep 2020 09:37) (16 - 18)  SpO2: 96% (27 Sep 2020 09:37) (94% - 98%)    PHYSICAL EXAM:  GENERAL: NAD, well-groomed, well-developed  HEAD:  Atraumatic, Normocephalic  EYES: EOMI, PERRLA, conjunctiva and sclera clear  ENMT: No tonsillar erythema, exudates, or enlargement; Moist mucous membranes, Good dentition, No lesions  NECK: Supple, No JVD, Normal thyroid  NERVOUS SYSTEM:  Alert & Oriented X3, Good concentration; Motor Strength 5/5 B/L upper and lower extremities; DTRs 2+ intact and symmetric  CHEST/LUNG: +mild coarse crackles, no tachypnea   HEART: Regular rate and rhythm; No murmurs, rubs, or gallops  ABDOMEN: Soft, Nontender, Nondistended; Bowel sounds present  EXTREMITIES:  2+ Peripheral Pulses, No clubbing, cyanosis, or edema  LYMPH: No lymphadenopathy noted  SKIN: No rashes or lesions    Consultant(s) Notes Reviewed:  [x ] YES  [ ] NO  Care Discussed with Consultants/Other Providers [ x] YES  [ ] NO    LABS:  CBC       BMP  20 @ 07:13  Anion Gap. Serum 9  Blood Urea Nitrogen,Serm 18  Calcium, Total Serum 8.7  Carbon Dioxide, Serum 27  Chloride, Serum 106  Creatinine, Serum 0.61  eGFR in  115  eGFR in Non Afican American 99  Gloucose, serum 111  Potassium, Serum 4.5  Sodium, Serum 142                  CMP  20 @ 07:13  Mandy Aminotransferase(ALT/SGPT)--  Albumin, Serum --  Alkaline Phosphatase, Serum --  Anion Gap, Serum 9  Aspartate Aminotransferase (AST/SGOT)--  Bilirubin Total, Serum --  Blood Urea Nitrogen, Serum 18  Calcium,Total Serum 8.7  Carbon Dioxide, Serum 27  Chloride, Serum 106  Creatinine, Serum 0.61  eGFR if  115  eGFR if Non African American 99  Glucose, Serum 111  Potassium, Serum 4.5  Protein Total, Serum --  Sodium, Serum 142                          PT/INR  PT/INR  20 @ 07:13  INR 0.98  Prothrombin Time Comment --  Prothrobin Time, [id number]      Amylase/Lipase            RADIOLOGY & ADDITIONAL TESTS:    Imaging Personally Reviewed:  [ ] YES  [ ] NO
PULMONARY CONSULT SERVICE FOLLOW-UP NOTE    INTERVAL HPI:  Reviewed chart and overnight events; patient seen and examined at bedside. SALOMÓN o/n. No new complaints. Feels better compared to admission.     MEDICATIONS:  Pulmonary:  ALBUTerol    90 MICROgram(s) HFA Inhaler 1 Puff(s) Inhalation every 4 hours  albuterol/ipratropium for Nebulization 3 milliLiter(s) Nebulizer every 6 hours  budesonide 160 MICROgram(s)/formoterol 4.5 MICROgram(s) Inhaler 2 Puff(s) Inhalation two times a day    Antimicrobials:    Anticoagulants:    Cardiac:      Allergies    allerigc to contrast dye (Short breath; Angioedema)  IV Contrast (Short breath; Angioedema)    Intolerances        Vital Signs Last 24 Hrs  T(C): 36.5 (25 Sep 2020 05:48), Max: 36.6 (24 Sep 2020 08:53)  T(F): 97.7 (25 Sep 2020 05:48), Max: 97.9 (24 Sep 2020 16:25)  HR: 100 (25 Sep 2020 05:48) (100 - 110)  BP: 136/97 (25 Sep 2020 05:48) (136/87 - 153/90)  BP(mean): --  RR: 18 (25 Sep 2020 05:48) (18 - 19)  SpO2: 92% (25 Sep 2020 05:48) (92% - 95%)    PHYSICAL EXAM:  Constitutional: WDWN  Head: NC/AT  EENT: PERRL, anicteric sclera; oropharynx clear, MMM  Neck: supple, no appreciable JVD  Respiratory: coarse crackles anteriorly   Cardiovascular: +S1/S2, RRR  Gastrointestinal: soft, NT/ND  Extremities: WWP; no edema, clubbing or cyanosis  Vascular: 2+ radial pulses B/L  Neurological: awake and alert; GALLOWAY      LABS:      CBC Full  -  ( 25 Sep 2020 07:14 )  WBC Count : 11.37 K/uL  RBC Count : 4.57 M/uL  Hemoglobin : 13.4 g/dL  Hematocrit : 41.6 %  Platelet Count - Automated : 284 K/uL  Mean Cell Volume : 91.0 fl  Mean Cell Hemoglobin : 29.3 pg  Mean Cell Hemoglobin Concentration : 32.2 gm/dL  Auto Neutrophil # : x  Auto Lymphocyte # : x  Auto Monocyte # : x  Auto Eosinophil # : x  Auto Basophil # : x  Auto Neutrophil % : x  Auto Lymphocyte % : x  Auto Monocyte % : x  Auto Eosinophil % : x  Auto Basophil % : x    09-25    142  |  106  |  18  ----------------------------<  111<H>  4.5   |  27  |  0.61    Ca    8.7      25 Sep 2020 07:13  Phos  3.2     09-24  Mg     2.0     09-25    TPro  8.7<H>  /  Alb  3.8  /  TBili  1.0  /  DBili  x   /  AST  12  /  ALT  9<L>  /  AlkPhos  105  09-23    PT/INR - ( 25 Sep 2020 07:13 )   PT: 11.8 sec;   INR: 0.98          PTT - ( 23 Sep 2020 12:50 )  PTT:34.0 sec      Urinalysis Basic - ( 23 Sep 2020 14:39 )    Color: Yellow / Appearance: Clear / SG: >=1.030 / pH: x  Gluc: x / Ketone: NEGATIVE  / Bili: Negative / Urobili: 0.2 E.U./dL   Blood: x / Protein: NEGATIVE mg/dL / Nitrite: NEGATIVE   Leuk Esterase: NEGATIVE / RBC: x / WBC x   Sq Epi: x / Non Sq Epi: x / Bacteria: x                RADIOLOGY & ADDITIONAL STUDIES:
PULMONARY CONSULT SERVICE FOLLOW-UP NOTE  -------------------------------------------------------------------    INTERVAL HPI:    No acute overnight events.   Pt seen and examined at bedside this AM.     Reports feeling a bit better. Still on 6L NC saturating mid-90s, not tachypneic - occasional cough  Desaturation when ambulating off O2 to bathroom with 'coughing spell'    Continues on Prednisone 40mg and Nebs       -------------------------------------------------------------------  MEDICATIONS:    Pulmonary:  ALBUTerol    90 MICROgram(s) HFA Inhaler 1 Puff(s) Inhalation every 4 hours  albuterol/ipratropium for Nebulization 3 milliLiter(s) Nebulizer every 6 hours  budesonide 160 MICROgram(s)/formoterol 4.5 MICROgram(s) Inhaler 2 Puff(s) Inhalation two times a day    Antimicrobials:    Anticoagulants:    Cardiac:      Allergies    allerigc to contrast dye (Short breath; Angioedema)  IV Contrast (Short breath; Angioedema)    Intolerances        Vital Signs Last 24 Hrs  T(C): 37 (26 Sep 2020 08:42), Max: 37 (26 Sep 2020 08:42)  T(F): 98.6 (26 Sep 2020 08:42), Max: 98.6 (26 Sep 2020 08:42)  HR: 71 (26 Sep 2020 08:42) (71 - 103)  BP: 156/71 (26 Sep 2020 08:42) (104/69 - 156/94)  BP(mean): --  RR: 16 (26 Sep 2020 08:42) (16 - 18)  SpO2: 97% (26 Sep 2020 08:42) (94% - 97%)        -------------------------------------------------------------------  PHYSICAL EXAM:    Constitutional: WDWN  Head: NC/AT  EENT: PERRL, anicteric sclera; oropharynx clear, MMM  Neck: supple, no appreciable JVD  Respiratory: coarse crackles anteriorly   Cardiovascular: +S1/S2, RRR  Gastrointestinal: soft, NT/ND  Extremities: WWP; no edema, clubbing or cyanosis  Vascular: 2+ radial pulses B/L  Neurological: awake and alert; GALLOWAY    -------------------------------------------------------------------  LABS:        CBC Full  -  ( 25 Sep 2020 07:14 )  WBC Count : 11.37 K/uL  RBC Count : 4.57 M/uL  Hemoglobin : 13.4 g/dL  Hematocrit : 41.6 %  Platelet Count - Automated : 284 K/uL  Mean Cell Volume : 91.0 fl  Mean Cell Hemoglobin : 29.3 pg  Mean Cell Hemoglobin Concentration : 32.2 gm/dL  Auto Neutrophil # : x  Auto Lymphocyte # : x  Auto Monocyte # : x  Auto Eosinophil # : x  Auto Basophil # : x  Auto Neutrophil % : x  Auto Lymphocyte % : x  Auto Monocyte % : x  Auto Eosinophil % : x  Auto Basophil % : x    09-25    142  |  106  |  18  ----------------------------<  111<H>  4.5   |  27  |  0.61    Ca    8.7      25 Sep 2020 07:13  Mg     2.0     09-25      PT/INR - ( 25 Sep 2020 07:13 )   PT: 11.8 sec;   INR: 0.98                            -------------------------------------------------------------------  RADIOLOGY & ADDITIONAL STUDIES:  
OVERNIGHT EVENTS: none    SUBJECTIVE / INTERVAL HPI: Patient seen and examined at bedside. no complaints    VITAL SIGNS:  Vital Signs Last 24 Hrs  T(C): 36.6 (28 Sep 2020 16:21), Max: 36.7 (27 Sep 2020 21:03)  T(F): 97.8 (28 Sep 2020 16:21), Max: 98.1 (27 Sep 2020 21:03)  HR: 83 (28 Sep 2020 16:21) (83 - 105)  BP: 141/92 (28 Sep 2020 16:21) (114/76 - 141/92)  BP(mean): --  RR: 17 (28 Sep 2020 16:21) (16 - 18)  SpO2: 95% (28 Sep 2020 16:21) (94% - 96%)  I&O's Summary      PHYSICAL EXAM:    General: WDWN  HEENT: NC/AT; PERRL, anicteric sclera; MMM  Neck: supple  Cardiovascular: +S1/S2; RRR  Respiratory: CTA B/L; no W/R/R  Gastrointestinal: soft, NT/ND; +BSx4  Extremities: WWP; no edema, clubbing or cyanosis  Vascular: 2+ radial, DP/PT pulses B/L  Neurological: AAOx3; no focal deficits    MEDICATIONS:  MEDICATIONS  (STANDING):  ALBUTerol    90 MICROgram(s) HFA Inhaler 1 Puff(s) Inhalation every 4 hours  albuterol/ipratropium for Nebulization 3 milliLiter(s) Nebulizer every 6 hours  budesonide 160 MICROgram(s)/formoterol 4.5 MICROgram(s) Inhaler 2 Puff(s) Inhalation two times a day  polyethylene glycol 3350 17 Gram(s) Oral once  predniSONE   Tablet 40 milliGRAM(s) Oral every 24 hours  trimethoprim  160 mG/sulfamethoxazole 800 mG 1 Tablet(s) Oral <User Schedule>    MEDICATIONS  (PRN):  acetaminophen    Suspension .. 650 milliGRAM(s) Oral every 6 hours PRN Mild Pain (1 - 3)      ALLERGIES:  Allergies    allerigc to contrast dye (Short breath; Angioedema)  IV Contrast (Short breath; Angioedema)    Intolerances        LABS:                        11.4   5.03  )-----------( 198      ( 28 Sep 2020 06:53 )             35.5     09-28    139  |  99  |  23  ----------------------------<  106<H>  4.1   |  29  |  0.59    Ca    9.3      28 Sep 2020 12:16  Phos  3.9     09-28  Mg     2.1     09-28          CAPILLARY BLOOD GLUCOSE          RADIOLOGY & ADDITIONAL TESTS: Reviewed.  
Patient is a 60y old  Female who presents with a chief complaint of SOB (24 Sep 2020 07:56)      OVERNIGHT EVENTS: No overnight events. At bedside, patient breathing on 4L NC.    REVIEW OF SYSTEMS:  CONSTITUTIONAL: +weight loss over past month (~7-10lb), decreased appetite; No fever, or fatigue  EYES: No eye pain, visual disturbances, or discharge  HEENT:  No difficulty hearing, tinnitus, vertigo; No sinus or throat pain  NECK: No pain or stiffness  RESPIRATORY: +SOB and cough; no wheezing, chills or hemoptysis;  CARDIOVASCULAR: No chest pain, palpitations, dizziness, or leg swelling  GASTROINTESTINAL: No abdominal or epigastric pain. No nausea, vomiting, or hematemesis; No diarrhea or constipation. No melena or hematochezia.  GENITOURINARY: No dysuria, frequency, hematuria, or incontinence  NEUROLOGICAL: No headaches, memory loss, loss of strength, numbness, or tremors  SKIN: No itching, burning, rashes, or lesions   LYMPH NODES: No enlarged glands  ENDOCRINE: No heat or cold intolerance; No hair loss  MUSCULOSKELETAL: No joint pain or swelling; No muscle, back, or extremity pain  PSYCHIATRIC: No depression, anxiety, mood swings, or difficulty sleeping  HEME/LYMPH: No easy bruising, or bleeding gums  ALLERGY AND IMMUNOLOGIC: No hives or eczema    allerigc to contrast dye (Short breath; Angioedema)  IV Contrast (Short breath; Angioedema)    FAMILY HISTORY:  Family history of renal disease    FH: breast cancer    FH: lymphoma        VITALS:  T(C): 36.6 (09-24-20 @ 08:53), Max: 37.6 (09-23-20 @ 13:00)  HR: 104 (09-24-20 @ 08:53) (104 - 116)  BP: 153/90 (09-24-20 @ 08:53) (129/71 - 153/90)  RR: 18 (09-24-20 @ 08:53) (18 - 20)  SpO2: 95% (09-24-20 @ 08:53) (84% - 96%)  Wt(kg): --Vital Signs Last 24 Hrs  T(C): 36.6 (24 Sep 2020 08:53), Max: 37.6 (23 Sep 2020 13:00)  T(F): 97.8 (24 Sep 2020 08:53), Max: 99.7 (23 Sep 2020 13:00)  HR: 104 (24 Sep 2020 08:53) (104 - 116)  BP: 153/90 (24 Sep 2020 08:53) (129/71 - 153/90)  BP(mean): --  RR: 18 (24 Sep 2020 08:53) (18 - 20)  SpO2: 95% (24 Sep 2020 08:53) (84% - 96%)      PHYSICAL EXAM:  GENERAL: NAD, breathing with NC;  HEAD:  Atraumatic, Normocephalic  EYES: EOMI, PERRLA, conjunctiva and sclera clear  HEENT: No tonsillar erythema, exudates, or enlargement; Moist mucous membranes, Good dentition, No lesions  NECK: Supple, No JVD, Normal thyroid  NERVOUS SYSTEM:  Alert & Oriented X3, Good concentration; Motor Strength 5/5 B/L upper and lower extremities;   CHEST/LUNG: +Crackles bilaterally in middle and lower lobes  HEART: Regular rate and rhythm; +S1, S2 with S2 splitting; No murmurs, rubs, or gallops  ABDOMEN: Soft, Nontender, Nondistended; Bowel sounds present  EXTREMITIES:  2+ Peripheral Pulses, No clubbing, cyanosis, or edema  LYMPH: No lymphadenopathy noted  SKIN: No rashes or lesions    MEDICATIONS  (STANDING):  ALBUTerol    90 MICROgram(s) HFA Inhaler 1 Puff(s) Inhalation every 4 hours  albuterol/ipratropium for Nebulization 3 milliLiter(s) Nebulizer every 6 hours  influenza   Vaccine 0.5 milliLiter(s) IntraMuscular once  tiotropium 18 MICROgram(s) Capsule 1 Capsule(s) Inhalation daily    MEDICATIONS  (PRN):      LAB/STUDIES:  CAPILLARY BLOOD GLUCOSE                              13.0   10.82 )-----------( 207      ( 24 Sep 2020 07:45 )             40.4     09-24    138  |  103  |  17  ----------------------------<  146<H>  4.1   |  22  |  0.57    Ca    8.5      24 Sep 2020 07:45  Phos  3.2     09-24  Mg     2.1     09-24    TPro  8.7<H>  /  Alb  3.8  /  TBili  1.0  /  DBili  x   /  AST  12  /  ALT  9<L>  /  AlkPhos  105  09-23               8.7  | 1.0  | 12       ------------------[105     ( 23 Sep 2020 12:50 )  3.8  | x    | 9           Lipase:x      Amylase:x        PT/INR - ( 23 Sep 2020 12:50 )   PT: 13.4 sec;   INR: 1.12          PTT - ( 23 Sep 2020 12:50 )  PTT:34.0 sec  Urinalysis Basic - ( 23 Sep 2020 14:39 )    Color: Yellow / Appearance: Clear / SG: >=1.030 / pH: x  Gluc: x / Ketone: NEGATIVE  / Bili: Negative / Urobili: 0.2 E.U./dL   Blood: x / Protein: NEGATIVE mg/dL / Nitrite: NEGATIVE   Leuk Esterase: NEGATIVE / RBC: x / WBC x   Sq Epi: x / Non Sq Epi: x / Bacteria: x      CARDIAC MARKERS ( 23 Sep 2020 12:50 )  x     / <0.01 ng/mL / x     / x     / x              Culture - Urine (collected 23 Sep 2020 14:59)  Source: .Urine Clean Catch (Midstream)  Final Report (24 Sep 2020 08:33):    Insignificant amount of mixed growth.    Culture - Blood (collected 23 Sep 2020 13:35)  Source: .Blood Blood-Peripheral  Preliminary Report (24 Sep 2020 02:01):    No growth at 12 hours    Culture - Blood (collected 23 Sep 2020 13:35)  Source: .Blood Blood-Peripheral  Preliminary Report (24 Sep 2020 02:01):    No growth at 12 hours    
Patient is a 60y old  Female who presents with a chief complaint of SOB (25 Sep 2020 08:26)      OVERNIGHT EVENTS: No overnight events. At bedside this morning, patient endorses bilateral eye swelling. Denies discharge, pruritis, or burning pain.    REVIEW OF SYSTEMS:  CONSTITUTIONAL: no weight changes, fever, or fatigue  EYES: No eye pain, visual disturbances, or discharge  HEENT:  No difficulty hearing, tinnitus, vertigo; No sinus or throat pain  NECK: No pain or stiffness  RESPIRATORY: +SOB and cough; no wheezing, chills or hemoptysis;  CARDIOVASCULAR: No chest pain, palpitations, dizziness, or leg swelling  GASTROINTESTINAL: No abdominal or epigastric pain. No nausea, vomiting, or hematemesis; No diarrhea or constipation. No melena or hematochezia.  GENITOURINARY: No dysuria, frequency, hematuria, or incontinence  NEUROLOGICAL: No headaches, memory loss, loss of strength, numbness, or tremors  SKIN: No itching, burning, rashes, or lesions   LYMPH NODES: No enlarged glands  ENDOCRINE: No heat or cold intolerance; No hair loss  MUSCULOSKELETAL: No joint pain or swelling; No muscle, back, or extremity pain  PSYCHIATRIC: No depression, anxiety, mood swings, or difficulty sleeping  HEME/LYMPH: No easy bruising, or bleeding gums  ALLERGY AND IMMUNOLOGIC: No hives or eczema    allerigc to contrast dye (Short breath; Angioedema)  IV Contrast (Short breath; Angioedema)    FAMILY HISTORY:  Family history of renal disease    FH: breast cancer    FH: lymphoma        VITALS:  T(C): 36.4 (09-25-20 @ 08:53), Max: 36.6 (09-24-20 @ 16:25)  HR: 97 (09-25-20 @ 08:53) (97 - 110)  BP: 134/90 (09-25-20 @ 08:53) (134/90 - 142/89)  RR: 18 (09-25-20 @ 08:53) (18 - 19)  SpO2: 96% (09-25-20 @ 08:53) (92% - 96%)  Wt(kg): --Vital Signs Last 24 Hrs  T(C): 36.4 (25 Sep 2020 08:53), Max: 36.6 (24 Sep 2020 16:25)  T(F): 97.6 (25 Sep 2020 08:53), Max: 97.9 (24 Sep 2020 16:25)  HR: 97 (25 Sep 2020 08:53) (97 - 110)  BP: 134/90 (25 Sep 2020 08:53) (134/90 - 142/89)  BP(mean): --  RR: 18 (25 Sep 2020 08:53) (18 - 19)  SpO2: 96% (25 Sep 2020 08:53) (92% - 96%)      PHYSICAL EXAM:  GENERAL: NAD, breathing with NC;  HEAD:  Atraumatic, Normocephalic  EYES: EOMI, PERRLA, conjunctiva and sclera clear  HEENT: No tonsillar erythema, exudates, or enlargement; Moist mucous membranes, Good dentition, No lesions  NECK: Supple, No JVD, Normal thyroid  NERVOUS SYSTEM:  Alert & Oriented X3, Good concentration; Motor Strength 5/5 B/L upper and lower extremities;   CHEST/LUNG: +Crackles bilaterally in middle and lower lobes  HEART: Regular rate and rhythm; +S1, S2 with S2 splitting; No murmurs, rubs, or gallops  ABDOMEN: Soft, Nontender, Nondistended; Bowel sounds present  EXTREMITIES:  2+ Peripheral Pulses, No clubbing, cyanosis, or edema  LYMPH: No lymphadenopathy noted  SKIN: No rashes or lesions    MEDICATIONS  (STANDING):  ALBUTerol    90 MICROgram(s) HFA Inhaler 1 Puff(s) Inhalation every 4 hours  albuterol/ipratropium for Nebulization 3 milliLiter(s) Nebulizer every 6 hours  budesonide 160 MICROgram(s)/formoterol 4.5 MICROgram(s) Inhaler 2 Puff(s) Inhalation two times a day  influenza   Vaccine 0.5 milliLiter(s) IntraMuscular once    MEDICATIONS  (PRN):      LAB/STUDIES:  CAPILLARY BLOOD GLUCOSE                              13.4   11.37 )-----------( 284      ( 25 Sep 2020 07:14 )             41.6     09-25    142  |  106  |  18  ----------------------------<  111<H>  4.5   |  27  |  0.61    Ca    8.7      25 Sep 2020 07:13  Phos  3.2     09-24  Mg     2.0     09-25    TPro  8.7<H>  /  Alb  3.8  /  TBili  1.0  /  DBili  x   /  AST  12  /  ALT  9<L>  /  AlkPhos  105  09-23               8.7  | 1.0  | 12       ------------------[105     ( 23 Sep 2020 12:50 )  3.8  | x    | 9           Lipase:x      Amylase:x        PT/INR - ( 25 Sep 2020 07:13 )   PT: 11.8 sec;   INR: 0.98          PTT - ( 23 Sep 2020 12:50 )  PTT:34.0 sec  Urinalysis Basic - ( 23 Sep 2020 14:39 )    Color: Yellow / Appearance: Clear / SG: >=1.030 / pH: x  Gluc: x / Ketone: NEGATIVE  / Bili: Negative / Urobili: 0.2 E.U./dL   Blood: x / Protein: NEGATIVE mg/dL / Nitrite: NEGATIVE   Leuk Esterase: NEGATIVE / RBC: x / WBC x   Sq Epi: x / Non Sq Epi: x / Bacteria: x      CARDIAC MARKERS ( 23 Sep 2020 12:50 )  x     / <0.01 ng/mL / x     / x     / x              Culture - Urine (collected 23 Sep 2020 14:59)  Source: .Urine Clean Catch (Midstream)  Final Report (24 Sep 2020 08:33):    Insignificant amount of mixed growth.    Culture - Blood (collected 23 Sep 2020 13:35)  Source: .Blood Blood-Peripheral  Preliminary Report (24 Sep 2020 14:01):    No growth at 1 day.    Culture - Blood (collected 23 Sep 2020 13:35)  Source: .Blood Blood-Peripheral  Preliminary Report (24 Sep 2020 14:01):    No growth at 1 day.        IMAGING:  Bronchoscopy today - pending results  
OVERNIGHT EVENTS: none    SUBJECTIVE / INTERVAL HPI: Patient seen and examined at bedside. No complaints. No SOB, CP, headache, f/c    VITAL SIGNS:  Vital Signs Last 24 Hrs  T(C): 36.6 (26 Sep 2020 16:23), Max: 37 (26 Sep 2020 08:42)  T(F): 97.9 (26 Sep 2020 16:23), Max: 98.6 (26 Sep 2020 08:42)  HR: 96 (26 Sep 2020 16:23) (71 - 103)  BP: 134/90 (26 Sep 2020 16:23) (104/69 - 156/94)  BP(mean): --  RR: 16 (26 Sep 2020 16:23) (16 - 18)  SpO2: 94% (26 Sep 2020 16:23) (94% - 97%)  I&O's Summary      PHYSICAL EXAM:    General: WDWN  HEENT: NC/AT; PERRL, anicteric sclera; MMM  Neck: supple  Cardiovascular: +S1/S2; RRR  Respiratory: CTA B/L; no W/R/R  Gastrointestinal: soft, NT/ND; +BSx4  Extremities: WWP; no edema, clubbing or cyanosis  Vascular: 2+ radial, DP/PT pulses B/L  Neurological: AAOx3; no focal deficits    MEDICATIONS:  MEDICATIONS  (STANDING):  ALBUTerol    90 MICROgram(s) HFA Inhaler 1 Puff(s) Inhalation every 4 hours  albuterol/ipratropium for Nebulization 3 milliLiter(s) Nebulizer every 6 hours  budesonide 160 MICROgram(s)/formoterol 4.5 MICROgram(s) Inhaler 2 Puff(s) Inhalation two times a day  influenza   Vaccine 0.5 milliLiter(s) IntraMuscular once  predniSONE   Tablet 40 milliGRAM(s) Oral every 24 hours    MEDICATIONS  (PRN):      ALLERGIES:  Allergies    allerigc to contrast dye (Short breath; Angioedema)  IV Contrast (Short breath; Angioedema)    Intolerances        LABS:                        13.4   11.37 )-----------( 284      ( 25 Sep 2020 07:14 )             41.6     09-25    142  |  106  |  18  ----------------------------<  111<H>  4.5   |  27  |  0.61    Ca    8.7      25 Sep 2020 07:13  Mg     2.0     09-25      PT/INR - ( 25 Sep 2020 07:13 )   PT: 11.8 sec;   INR: 0.98              CAPILLARY BLOOD GLUCOSE          RADIOLOGY & ADDITIONAL TESTS: Reviewed.

## 2020-09-29 LAB
P JIROVECII DNA L RESP QL NAA+NON-PROBE: NEGATIVE — SIGNIFICANT CHANGE UP
SPECIMEN SOURCE: SIGNIFICANT CHANGE UP

## 2020-09-30 ENCOUNTER — APPOINTMENT (OUTPATIENT)
Dept: PULMONOLOGY | Facility: CLINIC | Age: 60
End: 2020-09-30
Payer: COMMERCIAL

## 2020-09-30 VITALS
HEART RATE: 90 BPM | OXYGEN SATURATION: 96 % | SYSTOLIC BLOOD PRESSURE: 106 MMHG | WEIGHT: 164 LBS | BODY MASS INDEX: 27.32 KG/M2 | TEMPERATURE: 96.5 F | HEIGHT: 65 IN | DIASTOLIC BLOOD PRESSURE: 74 MMHG

## 2020-09-30 DIAGNOSIS — J45.50 SEVERE PERSISTENT ASTHMA, UNCOMPLICATED: ICD-10-CM

## 2020-09-30 PROCEDURE — 99213 OFFICE O/P EST LOW 20 MIN: CPT | Mod: GC

## 2020-09-30 RX ORDER — FLUTICASONE FUROATE AND VILANTEROL TRIFENATATE 200; 25 UG/1; UG/1
200-25 POWDER RESPIRATORY (INHALATION) DAILY
Qty: 1 | Refills: 3 | Status: DISCONTINUED | COMMUNITY
Start: 2020-08-20 | End: 2020-09-30

## 2020-09-30 NOTE — HISTORY OF PRESENT ILLNESS
[TextBox_4] : 8/19/2020 Wilder: 59 y/o female ?asthma who presents for f/u. She has no formal diagnosis of asthma. She was never asthmatic as a child and no family members with asthma. Both of her adult children suffer from asthma. Hospitalized in July for hypoxic respiratory failure. Has visited the emergency room approximately 10 times in the last year for respiratory failure. Improved each time in the ED with nebulizers and steroids. No other medical problems. No hx of radiation or chemotherapy. No medications. 25 yr with [employer]. 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 \par \par 9/30/2020 Wilder: Since previous visit patient was hospitalized for acute hypoxic respiratory failure. Her symptoms began after returning home and worsened significantly resulting in her hospitalization. We performed bronchoscopy with transbronchial biopsy demonstrating a lymphocytic BAL. She was started on prednisone for strong suspicion of hypersensitivity pneumonitis and weaned from O2. Since her discharge she has been living with her daughter in a separate building while they remove the mold from her home. She is feeling better overall and feels her breathing continues to improve. She did not take her prednisone yesterday but will fill her prescription today.

## 2020-09-30 NOTE — PHYSICAL EXAM
[No Acute Distress] : no acute distress [Normal Oropharynx] : normal oropharynx [Normal Rate/Rhythm] : normal rate/rhythm [Normal S1, S2] : normal s1, s2 [No Murmurs] : no murmurs [No Abnormalities] : no abnormalities [Benign] : benign [No Clubbing] : no clubbing [No Edema] : no edema [TextBox_68] : fine inspiratory crackles diffusely in upper lung fields

## 2020-09-30 NOTE — ASSESSMENT
[FreeTextEntry1] : Data reviewed: \par \par CT Chest [facility] 7/2020 personally reviewed : diffuse mosaic attenuation\par CT chest [facility] 9/25/20: diffuse GGO with sub pleural sparing. Apical predominant. areas of mosaic attenuation. \par \par [place] 8/19/20: severe restriction\par PFT 8/19/2 [facility]: complex restrictive abnormality, severe, FEV1 48%, TLC 69%, DLCO 48%, no sig BD response\par \par Impression: Hypersensitivity pneumonitis \par \par Plan:\par - Her symptoms are improving and she has removed herself from her mold exposure. Her CT scan, clinical symptoms, likely exposure and lymphocytic BAL all support a diagnosis of hypersensitivity pneumonitis. We are pending TBBx results but given her clinical response will continue steroids with bactrim prophylaxis. She will take prednisone 40 mg qd for 2 weeks and return to clinic for symptom evaluation and possible taper. She ambulated in the clinic w/o oxygen and maintained a saturation of 95%, she will discontinue her oxygen and breo inhaler. \par \par --\par Attending Addendum\par \par Agree w above. Admitted after our previous appointment, bronched revealing lymphocytic BAL (biopsies pending) and started on 40mg prednisone; has remained out of her home. Presumptive dx is HP. Feeling better, oxygenation has improved (off oxygen), exam better than my previous exam. Will continue prednisone 40mg and see back in 2 weeks to consider taper at that time. On Bactrim ppx. Flu vaccine deferred by pt; review next visit.\par

## 2020-09-30 NOTE — REVIEW OF SYSTEMS
[Dyspnea] : dyspnea [Negative] : HEENT [Cough] : no cough [Hemoptysis] : no hemoptysis [Sputum] : no sputum

## 2020-10-06 DIAGNOSIS — Z77.120 CONTACT WITH AND (SUSPECTED) EXPOSURE TO MOLD (TOXIC): ICD-10-CM

## 2020-10-06 DIAGNOSIS — Z91.14 PATIENT'S OTHER NONCOMPLIANCE WITH MEDICATION REGIMEN: ICD-10-CM

## 2020-10-06 DIAGNOSIS — J84.9 INTERSTITIAL PULMONARY DISEASE, UNSPECIFIED: ICD-10-CM

## 2020-10-06 DIAGNOSIS — Z80.3 FAMILY HISTORY OF MALIGNANT NEOPLASM OF BREAST: ICD-10-CM

## 2020-10-06 DIAGNOSIS — R65.10 SYSTEMIC INFLAMMATORY RESPONSE SYNDROME (SIRS) OF NON-INFECTIOUS ORIGIN WITHOUT ACUTE ORGAN DYSFUNCTION: ICD-10-CM

## 2020-10-06 DIAGNOSIS — Z91.041 RADIOGRAPHIC DYE ALLERGY STATUS: ICD-10-CM

## 2020-10-06 DIAGNOSIS — N63.0 UNSPECIFIED LUMP IN UNSPECIFIED BREAST: ICD-10-CM

## 2020-10-06 DIAGNOSIS — J67.9 HYPERSENSITIVITY PNEUMONITIS DUE TO UNSPECIFIED ORGANIC DUST: ICD-10-CM

## 2020-10-06 DIAGNOSIS — Z84.1 FAMILY HISTORY OF DISORDERS OF KIDNEY AND URETER: ICD-10-CM

## 2020-10-06 DIAGNOSIS — R06.02 SHORTNESS OF BREATH: ICD-10-CM

## 2020-10-06 DIAGNOSIS — J45.909 UNSPECIFIED ASTHMA, UNCOMPLICATED: ICD-10-CM

## 2020-10-06 DIAGNOSIS — J96.01 ACUTE RESPIRATORY FAILURE WITH HYPOXIA: ICD-10-CM

## 2020-10-06 DIAGNOSIS — Z80.7 FAMILY HISTORY OF OTHER MALIGNANT NEOPLASMS OF LYMPHOID, HEMATOPOIETIC AND RELATED TISSUES: ICD-10-CM

## 2020-10-14 ENCOUNTER — APPOINTMENT (OUTPATIENT)
Dept: PULMONOLOGY | Facility: CLINIC | Age: 60
End: 2020-10-14

## 2020-10-24 LAB
CULTURE RESULTS: SIGNIFICANT CHANGE UP
SPECIMEN SOURCE: SIGNIFICANT CHANGE UP

## 2020-11-14 LAB
CULTURE RESULTS: SIGNIFICANT CHANGE UP
SPECIMEN SOURCE: SIGNIFICANT CHANGE UP

## 2020-11-19 ENCOUNTER — APPOINTMENT (OUTPATIENT)
Dept: PULMONOLOGY | Facility: CLINIC | Age: 60
End: 2020-11-19

## 2021-05-03 NOTE — CONSULT NOTE ADULT - PROBLEM SELECTOR RECOMMENDATION 2
HPI/Subjective:   Patient ID: Padilla Short is a 46year due to above. pending further evaluation and w/u.

## 2022-02-27 ENCOUNTER — EMERGENCY (EMERGENCY)
Facility: HOSPITAL | Age: 62
LOS: 1 days | Discharge: HOME | End: 2022-02-27
Attending: EMERGENCY MEDICINE | Admitting: EMERGENCY MEDICINE
Payer: SELF-PAY

## 2022-02-27 VITALS
HEART RATE: 120 BPM | DIASTOLIC BLOOD PRESSURE: 79 MMHG | WEIGHT: 175.05 LBS | SYSTOLIC BLOOD PRESSURE: 125 MMHG | HEIGHT: 64 IN | TEMPERATURE: 100 F | OXYGEN SATURATION: 96 %

## 2022-02-27 VITALS
HEART RATE: 91 BPM | DIASTOLIC BLOOD PRESSURE: 78 MMHG | TEMPERATURE: 99 F | OXYGEN SATURATION: 99 % | RESPIRATION RATE: 16 BRPM | SYSTOLIC BLOOD PRESSURE: 109 MMHG

## 2022-02-27 DIAGNOSIS — Z98.89 OTHER SPECIFIED POSTPROCEDURAL STATES: Chronic | ICD-10-CM

## 2022-02-27 DIAGNOSIS — Z90.49 ACQUIRED ABSENCE OF OTHER SPECIFIED PARTS OF DIGESTIVE TRACT: Chronic | ICD-10-CM

## 2022-02-27 LAB
ALBUMIN SERPL ELPH-MCNC: 4.3 G/DL — SIGNIFICANT CHANGE UP (ref 3.3–5)
ALP SERPL-CCNC: 72 U/L — SIGNIFICANT CHANGE UP (ref 40–120)
ALT FLD-CCNC: 26 U/L — SIGNIFICANT CHANGE UP (ref 10–45)
ANION GAP SERPL CALC-SCNC: 11 MMOL/L — SIGNIFICANT CHANGE UP (ref 5–17)
ANISOCYTOSIS BLD QL: SLIGHT — SIGNIFICANT CHANGE UP
APPEARANCE UR: CLEAR — SIGNIFICANT CHANGE UP
APTT BLD: 31.1 SEC — SIGNIFICANT CHANGE UP (ref 27.5–35.5)
AST SERPL-CCNC: 18 U/L — SIGNIFICANT CHANGE UP (ref 10–40)
BACTERIA # UR AUTO: PRESENT /HPF
BASOPHILS # BLD AUTO: 0.09 K/UL — SIGNIFICANT CHANGE UP (ref 0–0.2)
BASOPHILS NFR BLD AUTO: 0.9 % — SIGNIFICANT CHANGE UP (ref 0–2)
BILIRUB SERPL-MCNC: 0.6 MG/DL — SIGNIFICANT CHANGE UP (ref 0.2–1.2)
BILIRUB UR-MCNC: NEGATIVE — SIGNIFICANT CHANGE UP
BUN SERPL-MCNC: 18 MG/DL — SIGNIFICANT CHANGE UP (ref 7–23)
C DIFF GDH STL QL: NEGATIVE — SIGNIFICANT CHANGE UP
C DIFF GDH STL QL: SIGNIFICANT CHANGE UP
CALCIUM SERPL-MCNC: 8.6 MG/DL — SIGNIFICANT CHANGE UP (ref 8.4–10.5)
CHLORIDE SERPL-SCNC: 107 MMOL/L — SIGNIFICANT CHANGE UP (ref 96–108)
CO2 SERPL-SCNC: 23 MMOL/L — SIGNIFICANT CHANGE UP (ref 22–31)
COLOR SPEC: YELLOW — SIGNIFICANT CHANGE UP
CREAT SERPL-MCNC: 0.61 MG/DL — SIGNIFICANT CHANGE UP (ref 0.5–1.3)
CULTURE RESULTS: SIGNIFICANT CHANGE UP
DIFF PNL FLD: ABNORMAL
EOSINOPHIL # BLD AUTO: 0 K/UL — SIGNIFICANT CHANGE UP (ref 0–0.5)
EOSINOPHIL NFR BLD AUTO: 0 % — SIGNIFICANT CHANGE UP (ref 0–6)
EPI CELLS # UR: SIGNIFICANT CHANGE UP /HPF (ref 0–5)
GIANT PLATELETS BLD QL SMEAR: PRESENT — SIGNIFICANT CHANGE UP
GLUCOSE SERPL-MCNC: 157 MG/DL — HIGH (ref 70–99)
GLUCOSE UR QL: NEGATIVE — SIGNIFICANT CHANGE UP
HCT VFR BLD CALC: 39.3 % — SIGNIFICANT CHANGE UP (ref 34.5–45)
HGB BLD-MCNC: 13.2 G/DL — SIGNIFICANT CHANGE UP (ref 11.5–15.5)
HYPOCHROMIA BLD QL: SIGNIFICANT CHANGE UP
INR BLD: 1.15 — SIGNIFICANT CHANGE UP (ref 0.88–1.16)
KETONES UR-MCNC: NEGATIVE — SIGNIFICANT CHANGE UP
LACTATE SERPL-SCNC: 1.2 MMOL/L — SIGNIFICANT CHANGE UP (ref 0.5–2)
LACTATE SERPL-SCNC: 2.8 MMOL/L — HIGH (ref 0.5–2)
LEUKOCYTE ESTERASE UR-ACNC: NEGATIVE — SIGNIFICANT CHANGE UP
LIDOCAIN IGE QN: 15 U/L — SIGNIFICANT CHANGE UP (ref 7–60)
LYMPHOCYTES # BLD AUTO: 0.26 K/UL — LOW (ref 1–3.3)
LYMPHOCYTES # BLD AUTO: 2.6 % — LOW (ref 13–44)
MANUAL SMEAR VERIFICATION: SIGNIFICANT CHANGE UP
MCHC RBC-ENTMCNC: 30.6 PG — SIGNIFICANT CHANGE UP (ref 27–34)
MCHC RBC-ENTMCNC: 33.6 GM/DL — SIGNIFICANT CHANGE UP (ref 32–36)
MCV RBC AUTO: 91 FL — SIGNIFICANT CHANGE UP (ref 80–100)
MONOCYTES # BLD AUTO: 0.61 K/UL — SIGNIFICANT CHANGE UP (ref 0–0.9)
MONOCYTES NFR BLD AUTO: 6.1 % — SIGNIFICANT CHANGE UP (ref 2–14)
NEUTROPHILS # BLD AUTO: 8.81 K/UL — HIGH (ref 1.8–7.4)
NEUTROPHILS NFR BLD AUTO: 87 % — HIGH (ref 43–77)
NEUTS BAND # BLD: 1.7 % — SIGNIFICANT CHANGE UP (ref 0–8)
NITRITE UR-MCNC: NEGATIVE — SIGNIFICANT CHANGE UP
PH UR: 5.5 — SIGNIFICANT CHANGE UP (ref 5–8)
PLAT MORPH BLD: ABNORMAL
PLATELET # BLD AUTO: 222 K/UL — SIGNIFICANT CHANGE UP (ref 150–400)
POLYCHROMASIA BLD QL SMEAR: SLIGHT — SIGNIFICANT CHANGE UP
POTASSIUM SERPL-MCNC: 4.5 MMOL/L — SIGNIFICANT CHANGE UP (ref 3.5–5.3)
POTASSIUM SERPL-SCNC: 4.5 MMOL/L — SIGNIFICANT CHANGE UP (ref 3.5–5.3)
PROT SERPL-MCNC: 7.5 G/DL — SIGNIFICANT CHANGE UP (ref 6–8.3)
PROT UR-MCNC: NEGATIVE MG/DL — SIGNIFICANT CHANGE UP
PROTHROM AB SERPL-ACNC: 13.7 SEC — HIGH (ref 10.5–13.4)
RBC # BLD: 4.32 M/UL — SIGNIFICANT CHANGE UP (ref 3.8–5.2)
RBC # FLD: 13.1 % — SIGNIFICANT CHANGE UP (ref 10.3–14.5)
RBC BLD AUTO: ABNORMAL
RBC CASTS # UR COMP ASSIST: < 5 /HPF — SIGNIFICANT CHANGE UP
SARS-COV-2 RNA SPEC QL NAA+PROBE: NEGATIVE — SIGNIFICANT CHANGE UP
SODIUM SERPL-SCNC: 141 MMOL/L — SIGNIFICANT CHANGE UP (ref 135–145)
SP GR SPEC: >=1.03 — SIGNIFICANT CHANGE UP (ref 1–1.03)
SPECIMEN SOURCE: SIGNIFICANT CHANGE UP
STOMATOCYTES BLD QL SMEAR: SLIGHT — SIGNIFICANT CHANGE UP
UROBILINOGEN FLD QL: 0.2 E.U./DL — SIGNIFICANT CHANGE UP
VARIANT LYMPHS # BLD: 1.7 % — SIGNIFICANT CHANGE UP (ref 0–6)
WBC # BLD: 9.93 K/UL — SIGNIFICANT CHANGE UP (ref 3.8–10.5)
WBC # FLD AUTO: 9.93 K/UL — SIGNIFICANT CHANGE UP (ref 3.8–10.5)
WBC UR QL: < 5 /HPF — SIGNIFICANT CHANGE UP

## 2022-02-27 PROCEDURE — 85025 COMPLETE CBC W/AUTO DIFF WBC: CPT

## 2022-02-27 PROCEDURE — 83690 ASSAY OF LIPASE: CPT

## 2022-02-27 PROCEDURE — 87635 SARS-COV-2 COVID-19 AMP PRB: CPT

## 2022-02-27 PROCEDURE — 87449 NOS EACH ORGANISM AG IA: CPT

## 2022-02-27 PROCEDURE — 87324 CLOSTRIDIUM AG IA: CPT

## 2022-02-27 PROCEDURE — 99285 EMERGENCY DEPT VISIT HI MDM: CPT

## 2022-02-27 PROCEDURE — 74176 CT ABD & PELVIS W/O CONTRAST: CPT | Mod: 26,MA

## 2022-02-27 PROCEDURE — 81001 URINALYSIS AUTO W/SCOPE: CPT

## 2022-02-27 PROCEDURE — 87045 FECES CULTURE AEROBIC BACT: CPT

## 2022-02-27 PROCEDURE — 96375 TX/PRO/DX INJ NEW DRUG ADDON: CPT

## 2022-02-27 PROCEDURE — 74176 CT ABD & PELVIS W/O CONTRAST: CPT | Mod: MA

## 2022-02-27 PROCEDURE — 80053 COMPREHEN METABOLIC PANEL: CPT

## 2022-02-27 PROCEDURE — 99285 EMERGENCY DEPT VISIT HI MDM: CPT | Mod: 25

## 2022-02-27 PROCEDURE — 87507 IADNA-DNA/RNA PROBE TQ 12-25: CPT

## 2022-02-27 PROCEDURE — 87046 STOOL CULTR AEROBIC BACT EA: CPT

## 2022-02-27 PROCEDURE — 83605 ASSAY OF LACTIC ACID: CPT

## 2022-02-27 PROCEDURE — 93010 ELECTROCARDIOGRAM REPORT: CPT

## 2022-02-27 PROCEDURE — 93005 ELECTROCARDIOGRAM TRACING: CPT

## 2022-02-27 PROCEDURE — 96365 THER/PROPH/DIAG IV INF INIT: CPT

## 2022-02-27 PROCEDURE — 36415 COLL VENOUS BLD VENIPUNCTURE: CPT

## 2022-02-27 PROCEDURE — 85730 THROMBOPLASTIN TIME PARTIAL: CPT

## 2022-02-27 PROCEDURE — 85610 PROTHROMBIN TIME: CPT

## 2022-02-27 RX ORDER — IOHEXOL 300 MG/ML
30 INJECTION, SOLUTION INTRAVENOUS ONCE
Refills: 0 | Status: COMPLETED | OUTPATIENT
Start: 2022-02-27 | End: 2022-02-27

## 2022-02-27 RX ORDER — CEFTRIAXONE 500 MG/1
1000 INJECTION, POWDER, FOR SOLUTION INTRAMUSCULAR; INTRAVENOUS ONCE
Refills: 0 | Status: COMPLETED | OUTPATIENT
Start: 2022-02-27 | End: 2022-02-27

## 2022-02-27 RX ORDER — METRONIDAZOLE 500 MG
500 TABLET ORAL ONCE
Refills: 0 | Status: COMPLETED | OUTPATIENT
Start: 2022-02-27 | End: 2022-02-27

## 2022-02-27 RX ORDER — MORPHINE SULFATE 50 MG/1
4 CAPSULE, EXTENDED RELEASE ORAL ONCE
Refills: 0 | Status: DISCONTINUED | OUTPATIENT
Start: 2022-02-27 | End: 2022-02-27

## 2022-02-27 RX ORDER — ACETAMINOPHEN 500 MG
650 TABLET ORAL ONCE
Refills: 0 | Status: COMPLETED | OUTPATIENT
Start: 2022-02-27 | End: 2022-02-27

## 2022-02-27 RX ORDER — AZITHROMYCIN 500 MG/1
1000 TABLET, FILM COATED ORAL ONCE
Refills: 0 | Status: COMPLETED | OUTPATIENT
Start: 2022-02-27 | End: 2022-02-27

## 2022-02-27 RX ORDER — SODIUM CHLORIDE 9 MG/ML
500 INJECTION INTRAMUSCULAR; INTRAVENOUS; SUBCUTANEOUS ONCE
Refills: 0 | Status: COMPLETED | OUTPATIENT
Start: 2022-02-27 | End: 2022-02-27

## 2022-02-27 RX ORDER — SODIUM CHLORIDE 9 MG/ML
1000 INJECTION INTRAMUSCULAR; INTRAVENOUS; SUBCUTANEOUS ONCE
Refills: 0 | Status: COMPLETED | OUTPATIENT
Start: 2022-02-27 | End: 2022-02-27

## 2022-02-27 RX ORDER — ALBUTEROL 90 UG/1
2 AEROSOL, METERED ORAL
Qty: 1 | Refills: 0
Start: 2022-02-27 | End: 2022-03-28

## 2022-02-27 RX ORDER — ONDANSETRON 8 MG/1
1 TABLET, FILM COATED ORAL
Qty: 15 | Refills: 0
Start: 2022-02-27 | End: 2022-03-03

## 2022-02-27 RX ORDER — ONDANSETRON 8 MG/1
4 TABLET, FILM COATED ORAL ONCE
Refills: 0 | Status: COMPLETED | OUTPATIENT
Start: 2022-02-27 | End: 2022-02-27

## 2022-02-27 RX ADMIN — SODIUM CHLORIDE 500 MILLILITER(S): 9 INJECTION INTRAMUSCULAR; INTRAVENOUS; SUBCUTANEOUS at 08:32

## 2022-02-27 RX ADMIN — Medication 650 MILLIGRAM(S): at 06:24

## 2022-02-27 RX ADMIN — Medication 500 MILLIGRAM(S): at 08:37

## 2022-02-27 RX ADMIN — Medication 100 MILLIGRAM(S): at 07:33

## 2022-02-27 RX ADMIN — IOHEXOL 30 MILLILITER(S): 300 INJECTION, SOLUTION INTRAVENOUS at 06:15

## 2022-02-27 RX ADMIN — AZITHROMYCIN 1000 MILLIGRAM(S): 500 TABLET, FILM COATED ORAL at 10:58

## 2022-02-27 RX ADMIN — CEFTRIAXONE 100 MILLIGRAM(S): 500 INJECTION, POWDER, FOR SOLUTION INTRAMUSCULAR; INTRAVENOUS at 08:31

## 2022-02-27 RX ADMIN — Medication 650 MILLIGRAM(S): at 08:37

## 2022-02-27 RX ADMIN — SODIUM CHLORIDE 2000 MILLILITER(S): 9 INJECTION INTRAMUSCULAR; INTRAVENOUS; SUBCUTANEOUS at 06:15

## 2022-02-27 RX ADMIN — ONDANSETRON 4 MILLIGRAM(S): 8 TABLET, FILM COATED ORAL at 06:15

## 2022-02-27 NOTE — ED PROVIDER NOTE - PROGRESS NOTE DETAILS
Director - pt received at 7 am.  VS, labs reviewed.  Pt reports ongoing diarrhea but improved nausea, abd pain is less.  + ttp mid abd.  CT done and pending.  Plan repeat lactate after ivf/abx. rpt lac wnl, abd CT normal. Will treat with 1g azithro for ?food borne pathogen. Pt stable for dc

## 2022-02-27 NOTE — ED ADULT NURSE REASSESSMENT NOTE - NS ED NURSE REASSESS COMMENT FT1
Received from [facility] Receiving IV antibiotics and IV fluids. Currently on 1L, noted 600ml infused. Stool and labs sent. CT performed and pending results. Pending repeat lactate and additional fluids. Updated with plan of care. No distress noted.

## 2022-02-27 NOTE — ED ADULT NURSE NOTE - NSIMPLEMENTINTERV_GEN_ALL_ED
Implemented All Universal Safety Interventions:  [place] to call system. Call bell, personal items and telephone within reach. Instruct patient to call for assistance. Room bathroom lighting operational. Non-slip footwear when patient is off stretcher. Physically safe environment: no spills, clutter or unnecessary equipment. Stretcher in lowest position, wheels locked, appropriate side rails in place.

## 2022-02-27 NOTE — ED PROVIDER NOTE - OBJECTIVE STATEMENT
62 yo F with PMH of asthma, diverticulitis, PSH of cholecystectomy, lung biopsy (no cancer),  X 1 p/w nausea, nb vomiting and nb diarrhea X several hours after she ate a pork empanada at a restaurant. Pt states that she has had 5-6 episodes of nb bilious emesis and copious 15 plus episodes of watery diarrhea since the onset of her sxs with diffuse crampy abd pain. Pt went to an ED in [place] and had bloodwork where it was noted she had a lactate of 2.5 and a WBC of 13.2. Covid and Flu were negative. LFTs were mildly elevated. Potassium was WNL (Pt brings copy of labs with her). Pt was given Reglan 10 mg IV, Zofran 4 mg IV and 2 liters NS. The plan was to obtain a CT scan of Abd/pelvis after premedicating pt as pt has anaphylaxis to IV contrast, but pt left AMA as she did not want IV contrast and presents to [facility] ED for further evaluation. 62 yo F with PMH of asthma/ "lung disease", diverticulitis, PSH of cholecystectomy, lung biopsy (not malignant),  X 1 p/w nausea, nb vomiting and nb diarrhea X several hours after she ate a pork empanada at a restaurant. Pt states that she has had 5-6 episodes of nb bilious emesis and copious 15 plus episodes of watery diarrhea since the onset of her sxs with diffuse crampy abd pain. Pt went to an ED in [place] and had bloodwork where it was noted she had a lactate of 2.5 and a WBC of 13.2. Covid and Flu were negative. LFTs were mildly elevated. Potassium was WNL (Pt brings copy of labs with her). Pt was given Reglan 10 mg IV, Zofran 4 mg IV and 2 liters NS. The plan was to obtain a CT scan of Abd/pelvis after premedicating pt as pt has anaphylaxis to IV contrast, but pt left AMA as she did not want IV contrast and presents to [facility] ED for further evaluation. Pt is not vaccinated for Covid 19. States that her sxs are improved but still c/o nausea. Denies fevers, chills, cough, CP, SOB. No other complaints.

## 2022-02-27 NOTE — ED PROVIDER NOTE - NSFOLLOWUPINSTRUCTIONS_ED_ALL_ED_FT
Diarrhea    Diarrhea is frequent loose or watery bowel movements that has many causes. Diarrhea can make you feel weak and cause you to become dehydrated. Diarrhea typically lasts 2–3 days, but can last longer if it is a sign of something more serious. Drink clear fluids to prevent dehydration. Eat bland, easy-to-digest foods as tolerated- BRAT Diet. BANANA, RICE, APPLESAUCE, TOAST    SEEK IMMEDIATE MEDICAL CARE IF YOU HAVE ANY OF THE FOLLOWING SYMPTOMS: high fevers, lightheadedness/dizziness, chest pain, black or bloody stools, shortness of breath, severe abdominal or back pain, or any signs of dehydration.      Nausea / Vomiting    Nausea is the feeling that you have to vomit. As nausea gets worse, it can lead to vomiting. Vomiting puts you at an increased risk for dehydration. Older adults and people with other diseases or a weak immune system are at higher risk for dehydration. Drink clear fluids in small but frequent amounts as tolerated. Eat bland, easy-to-digest foods in small amounts as tolerated.    SEEK IMMEDIATE MEDICAL CARE IF YOU HAVE ANY OF THE FOLLOWING SYMPTOMS: fever, inability to keep sufficient fluids down, black or bloody vomitus, black or bloody stools, lightheadedness/dizziness, chest pain, severe headache, rash, shortness of breath, cold or clammy skin, confusion, pain with urination, or any signs of dehydration.

## 2022-02-27 NOTE — ED PROVIDER NOTE - CLINICAL SUMMARY MEDICAL DECISION MAKING FREE TEXT BOX
60 yo F with PMH of asthma/ "lung disease", diverticulitis, PSH of cholecystectomy, lung biopsy (not malignant),  X 1 p/w nausea, nb vomiting and nb diarrhea X several hours after she ate a pork empanada at a restaurant. Pt states that she has had 5-6 episodes of nb bilious emesis and copious 15 plus episodes of watery diarrhea since the onset of her sxs with diffuse crampy abd pain. Pt went to an ED in [place] and had bloodwork where it was noted she had a lactate of 2.5 and a WBC of 13.2. Covid and Flu were negative. LFTs were mildly elevated. Potassium was WNL (Pt brings copy of labs with her). Pt was given Reglan 10 mg IV, Zofran 4 mg IV and 2 liters NS. The plan was to obtain a CT scan of Abd/pelvis after premedicating pt as pt has anaphylaxis to IV contrast, but pt left AMA as she did not want IV contrast and presents to [facility] ED for further evaluation. Pt is not vaccinated for Covid 19. States that her sxs are improved but still c/o nausea. Denies fevers, chills, cough, CP, SOB. No other complaints.  ED course: VS noted. Pt tachycardic and febrile in ED. Abd: soft with mid abd TTP. Given Tylenol, Zofran and IVF. Pt declines Morphine. Labs noted. WBC WNL. UA with no infection. Lactate mildly elevated. Pt given IV abx for colitis. Pt with few episodes of nb diarrhea while in ED - Stool studies sent. CT abd/pelvis with po contrast ordered and pending. Case endorsed to day team pending CT A/P and reevaluation. Plan and findings discussed with pt and family.

## 2022-02-27 NOTE — ED ADULT NURSE NOTE - OBJECTIVE STATEMENT
pt with abdominal pain, N/V/D since yesterday, came from other Ed, where refused IV contrast/allergic to dye , wants second opinion, pt will have CT wwith oral contast

## 2022-02-27 NOTE — ED PROVIDER NOTE - NEUROLOGICAL, MLM
Have fun in [place] and keep up exercise and do cardio and strength as well. Exercise: Adding Intensity  You have been exercising for 30 minutes most days of the week. Now you can move on to the next stage: increasing the intensity.  This means doing © 0176-1914 [address], [address]. All rights reserved. This information is not intended as a substitute for professional medical care. Always follow your healthcare professional's instructions. Alert and oriented, no focal deficits

## 2022-02-27 NOTE — ED ADULT TRIAGE NOTE - CHIEF COMPLAINT QUOTE
pt states " I have abdominal pain, vomiting and diarrhea since I had empanadas at 7 pm." pt was at another ED, where she received Zofran and raglan , they wanted to do CT with IV contrast of abdomen, but pt is allergic to contrast, thus she left and came to different ED.

## 2022-02-27 NOTE — ED PROVIDER NOTE - PATIENT PORTAL LINK FT
You can access the FollowMyHealth Patient Portal offered by [facility] by registering at the following website: [email or web address]. By joining [facility]’s FollowMyHealth portal, you will also be able to view your health information using other applications (apps) compatible with our system.

## 2022-02-28 NOTE — ED POST DISCHARGE NOTE - DETAILS
NV resolved, still w/ loose nb watery stools but able to hydrate PO.  educated on continued supportive care and pmd f/ui

## 2022-03-01 DIAGNOSIS — R10.84 GENERALIZED ABDOMINAL PAIN: ICD-10-CM

## 2022-03-01 DIAGNOSIS — R19.7 DIARRHEA, UNSPECIFIED: ICD-10-CM

## 2022-03-01 DIAGNOSIS — J45.909 UNSPECIFIED ASTHMA, UNCOMPLICATED: ICD-10-CM

## 2022-03-01 DIAGNOSIS — Z91.041 RADIOGRAPHIC DYE ALLERGY STATUS: ICD-10-CM

## 2022-03-01 DIAGNOSIS — Z87.891 PERSONAL HISTORY OF NICOTINE DEPENDENCE: ICD-10-CM

## 2022-03-01 DIAGNOSIS — Z20.822 CONTACT WITH AND (SUSPECTED) EXPOSURE TO COVID-19: ICD-10-CM

## 2022-03-01 DIAGNOSIS — R74.01 ELEVATION OF LEVELS OF LIVER TRANSAMINASE LEVELS: ICD-10-CM

## 2022-03-01 DIAGNOSIS — R11.2 NAUSEA WITH VOMITING, UNSPECIFIED: ICD-10-CM

## 2022-03-02 LAB
CULTURE RESULTS: SIGNIFICANT CHANGE UP
SPECIMEN SOURCE: SIGNIFICANT CHANGE UP
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
## 2022-07-04 ENCOUNTER — INPATIENT (INPATIENT)
Facility: HOSPITAL | Age: 62
LOS: 0 days | Discharge: LEFT BEFORE DISCHARGE | DRG: 202 | End: 2022-07-05
Attending: HOSPITALIST | Admitting: HOSPITALIST
Payer: MEDICAID

## 2022-07-04 VITALS
OXYGEN SATURATION: 96 % | DIASTOLIC BLOOD PRESSURE: 82 MMHG | WEIGHT: 175.05 LBS | HEIGHT: 64 IN | SYSTOLIC BLOOD PRESSURE: 133 MMHG | HEART RATE: 114 BPM | TEMPERATURE: 98 F | RESPIRATION RATE: 20 BRPM

## 2022-07-04 DIAGNOSIS — Z29.8 ENCOUNTER FOR OTHER SPECIFIED PROPHYLACTIC MEASURES: ICD-10-CM

## 2022-07-04 DIAGNOSIS — Z29.9 ENCOUNTER FOR PROPHYLACTIC MEASURES, UNSPECIFIED: ICD-10-CM

## 2022-07-04 DIAGNOSIS — D21.9 BENIGN NEOPLASM OF CONNECTIVE AND OTHER SOFT TISSUE, UNSPECIFIED: ICD-10-CM

## 2022-07-04 DIAGNOSIS — J45.909 UNSPECIFIED ASTHMA, UNCOMPLICATED: ICD-10-CM

## 2022-07-04 DIAGNOSIS — J45.901 UNSPECIFIED ASTHMA WITH (ACUTE) EXACERBATION: ICD-10-CM

## 2022-07-04 DIAGNOSIS — Z90.49 ACQUIRED ABSENCE OF OTHER SPECIFIED PARTS OF DIGESTIVE TRACT: Chronic | ICD-10-CM

## 2022-07-04 DIAGNOSIS — N63.0 UNSPECIFIED LUMP IN UNSPECIFIED BREAST: ICD-10-CM

## 2022-07-04 DIAGNOSIS — K57.92 DIVERTICULITIS OF INTESTINE, PART UNSPECIFIED, WITHOUT PERFORATION OR ABSCESS WITHOUT BLEEDING: ICD-10-CM

## 2022-07-04 DIAGNOSIS — Z98.89 OTHER SPECIFIED POSTPROCEDURAL STATES: Chronic | ICD-10-CM

## 2022-07-04 LAB
ALBUMIN SERPL ELPH-MCNC: 4.3 G/DL — SIGNIFICANT CHANGE UP (ref 3.3–5)
ALP SERPL-CCNC: 99 U/L — SIGNIFICANT CHANGE UP (ref 40–120)
ALT FLD-CCNC: 28 U/L — SIGNIFICANT CHANGE UP (ref 10–45)
ANION GAP SERPL CALC-SCNC: 11 MMOL/L — SIGNIFICANT CHANGE UP (ref 5–17)
AST SERPL-CCNC: 25 U/L — SIGNIFICANT CHANGE UP (ref 10–40)
BASE EXCESS BLDV CALC-SCNC: 1.7 MMOL/L — SIGNIFICANT CHANGE UP (ref -2–3)
BASOPHILS # BLD AUTO: 0.05 K/UL — SIGNIFICANT CHANGE UP (ref 0–0.2)
BASOPHILS NFR BLD AUTO: 0.9 % — SIGNIFICANT CHANGE UP (ref 0–2)
BILIRUB SERPL-MCNC: 0.6 MG/DL — SIGNIFICANT CHANGE UP (ref 0.2–1.2)
BUN SERPL-MCNC: 13 MG/DL — SIGNIFICANT CHANGE UP (ref 7–23)
CA-I SERPL-SCNC: 1.14 MMOL/L — LOW (ref 1.15–1.33)
CALCIUM SERPL-MCNC: 9 MG/DL — SIGNIFICANT CHANGE UP (ref 8.4–10.5)
CHLORIDE SERPL-SCNC: 104 MMOL/L — SIGNIFICANT CHANGE UP (ref 96–108)
CO2 BLDV-SCNC: 29.2 MMOL/L — HIGH (ref 22–26)
CO2 SERPL-SCNC: 25 MMOL/L — SIGNIFICANT CHANGE UP (ref 22–31)
CREAT SERPL-MCNC: 0.65 MG/DL — SIGNIFICANT CHANGE UP (ref 0.5–1.3)
EGFR: 100 ML/MIN/1.73M2 — SIGNIFICANT CHANGE UP
EOSINOPHIL # BLD AUTO: 0.25 K/UL — SIGNIFICANT CHANGE UP (ref 0–0.5)
EOSINOPHIL NFR BLD AUTO: 4.4 % — SIGNIFICANT CHANGE UP (ref 0–6)
FLUAV AG NPH QL: SIGNIFICANT CHANGE UP
FLUBV AG NPH QL: SIGNIFICANT CHANGE UP
GAS PNL BLDV: 135 MMOL/L — LOW (ref 136–145)
GAS PNL BLDV: SIGNIFICANT CHANGE UP
GLUCOSE SERPL-MCNC: 125 MG/DL — HIGH (ref 70–99)
HCO3 BLDV-SCNC: 28 MMOL/L — SIGNIFICANT CHANGE UP (ref 22–29)
HCT VFR BLD CALC: 41.9 % — SIGNIFICANT CHANGE UP (ref 34.5–45)
HGB BLD-MCNC: 14.1 G/DL — SIGNIFICANT CHANGE UP (ref 11.5–15.5)
IMM GRANULOCYTES NFR BLD AUTO: 0.4 % — SIGNIFICANT CHANGE UP (ref 0–1.5)
LYMPHOCYTES # BLD AUTO: 1.38 K/UL — SIGNIFICANT CHANGE UP (ref 1–3.3)
LYMPHOCYTES # BLD AUTO: 24.5 % — SIGNIFICANT CHANGE UP (ref 13–44)
MCHC RBC-ENTMCNC: 29.9 PG — SIGNIFICANT CHANGE UP (ref 27–34)
MCHC RBC-ENTMCNC: 33.7 GM/DL — SIGNIFICANT CHANGE UP (ref 32–36)
MCV RBC AUTO: 88.8 FL — SIGNIFICANT CHANGE UP (ref 80–100)
MONOCYTES # BLD AUTO: 0.77 K/UL — SIGNIFICANT CHANGE UP (ref 0–0.9)
MONOCYTES NFR BLD AUTO: 13.7 % — SIGNIFICANT CHANGE UP (ref 2–14)
NEUTROPHILS # BLD AUTO: 3.17 K/UL — SIGNIFICANT CHANGE UP (ref 1.8–7.4)
NEUTROPHILS NFR BLD AUTO: 56.1 % — SIGNIFICANT CHANGE UP (ref 43–77)
NRBC # BLD: 0 /100 WBCS — SIGNIFICANT CHANGE UP (ref 0–0)
PCO2 BLDV: 48 MMHG — HIGH (ref 39–42)
PH BLDV: 7.37 — SIGNIFICANT CHANGE UP (ref 7.32–7.43)
PLATELET # BLD AUTO: 249 K/UL — SIGNIFICANT CHANGE UP (ref 150–400)
PO2 BLDV: <33 MMHG — SIGNIFICANT CHANGE UP (ref 25–45)
POTASSIUM BLDV-SCNC: 3.9 MMOL/L — SIGNIFICANT CHANGE UP (ref 3.5–5.1)
POTASSIUM SERPL-MCNC: 4.5 MMOL/L — SIGNIFICANT CHANGE UP (ref 3.5–5.3)
POTASSIUM SERPL-SCNC: 4.5 MMOL/L — SIGNIFICANT CHANGE UP (ref 3.5–5.3)
PROT SERPL-MCNC: 8.1 G/DL — SIGNIFICANT CHANGE UP (ref 6–8.3)
RBC # BLD: 4.72 M/UL — SIGNIFICANT CHANGE UP (ref 3.8–5.2)
RBC # FLD: 12.5 % — SIGNIFICANT CHANGE UP (ref 10.3–14.5)
RSV RNA NPH QL NAA+NON-PROBE: SIGNIFICANT CHANGE UP
SAO2 % BLDV: 51 % — LOW (ref 67–88)
SARS-COV-2 RNA SPEC QL NAA+PROBE: SIGNIFICANT CHANGE UP
SODIUM SERPL-SCNC: 140 MMOL/L — SIGNIFICANT CHANGE UP (ref 135–145)
TROPONIN T SERPL-MCNC: 0.01 NG/ML — SIGNIFICANT CHANGE UP (ref 0–0.01)
WBC # BLD: 5.64 K/UL — SIGNIFICANT CHANGE UP (ref 3.8–10.5)
WBC # FLD AUTO: 5.64 K/UL — SIGNIFICANT CHANGE UP (ref 3.8–10.5)

## 2022-07-04 PROCEDURE — 71045 X-RAY EXAM CHEST 1 VIEW: CPT | Mod: 26

## 2022-07-04 PROCEDURE — 99223 1ST HOSP IP/OBS HIGH 75: CPT | Mod: GC

## 2022-07-04 PROCEDURE — 99285 EMERGENCY DEPT VISIT HI MDM: CPT

## 2022-07-04 RX ORDER — SODIUM CHLORIDE 9 MG/ML
1000 INJECTION INTRAMUSCULAR; INTRAVENOUS; SUBCUTANEOUS ONCE
Refills: 0 | Status: COMPLETED | OUTPATIENT
Start: 2022-07-04 | End: 2022-07-04

## 2022-07-04 RX ORDER — LANOLIN ALCOHOL/MO/W.PET/CERES
3 CREAM (GRAM) TOPICAL AT BEDTIME
Refills: 0 | Status: DISCONTINUED | OUTPATIENT
Start: 2022-07-04 | End: 2022-07-04

## 2022-07-04 RX ORDER — ONDANSETRON 8 MG/1
4 TABLET, FILM COATED ORAL EVERY 8 HOURS
Refills: 0 | Status: DISCONTINUED | OUTPATIENT
Start: 2022-07-04 | End: 2022-07-04

## 2022-07-04 RX ORDER — IPRATROPIUM/ALBUTEROL SULFATE 18-103MCG
3 AEROSOL WITH ADAPTER (GRAM) INHALATION ONCE
Refills: 0 | Status: COMPLETED | OUTPATIENT
Start: 2022-07-04 | End: 2022-07-04

## 2022-07-04 RX ORDER — IPRATROPIUM/ALBUTEROL SULFATE 18-103MCG
3 AEROSOL WITH ADAPTER (GRAM) INHALATION EVERY 6 HOURS
Refills: 0 | Status: DISCONTINUED | OUTPATIENT
Start: 2022-07-04 | End: 2022-07-05

## 2022-07-04 RX ORDER — ACETAMINOPHEN 500 MG
650 TABLET ORAL EVERY 6 HOURS
Refills: 0 | Status: DISCONTINUED | OUTPATIENT
Start: 2022-07-04 | End: 2022-07-05

## 2022-07-04 RX ORDER — ACETAMINOPHEN 500 MG
650 TABLET ORAL ONCE
Refills: 0 | Status: COMPLETED | OUTPATIENT
Start: 2022-07-04 | End: 2022-07-04

## 2022-07-04 RX ORDER — MAGNESIUM SULFATE 500 MG/ML
2 VIAL (ML) INJECTION ONCE
Refills: 0 | Status: COMPLETED | OUTPATIENT
Start: 2022-07-04 | End: 2022-07-04

## 2022-07-04 RX ADMIN — Medication 650 MILLIGRAM(S): at 15:33

## 2022-07-04 RX ADMIN — Medication 150 GRAM(S): at 17:09

## 2022-07-04 RX ADMIN — SODIUM CHLORIDE 1000 MILLILITER(S): 9 INJECTION INTRAMUSCULAR; INTRAVENOUS; SUBCUTANEOUS at 16:00

## 2022-07-04 RX ADMIN — Medication 650 MILLIGRAM(S): at 15:03

## 2022-07-04 RX ADMIN — Medication 3 MILLILITER(S): at 15:03

## 2022-07-04 RX ADMIN — Medication 2 GRAM(S): at 17:40

## 2022-07-04 RX ADMIN — SODIUM CHLORIDE 1000 MILLILITER(S): 9 INJECTION INTRAMUSCULAR; INTRAVENOUS; SUBCUTANEOUS at 15:03

## 2022-07-04 RX ADMIN — Medication 3 MILLILITER(S): at 15:04

## 2022-07-04 RX ADMIN — SODIUM CHLORIDE 1000 MILLILITER(S): 9 INJECTION INTRAMUSCULAR; INTRAVENOUS; SUBCUTANEOUS at 16:51

## 2022-07-04 RX ADMIN — SODIUM CHLORIDE 1000 MILLILITER(S): 9 INJECTION INTRAMUSCULAR; INTRAVENOUS; SUBCUTANEOUS at 18:00

## 2022-07-04 RX ADMIN — Medication 125 MILLIGRAM(S): at 15:03

## 2022-07-04 RX ADMIN — Medication 3 MILLILITER(S): at 17:09

## 2022-07-04 NOTE — H&P ADULT - NSHPLABSRESULTS_GEN_ALL_CORE
LABS:                        14.1   5.64  )-----------( 249      ( 04 Jul 2022 15:14 )             41.9     07-04    140  |  104  |  13  ----------------------------<  125<H>  4.5   |  25  |  0.65    Ca    9.0      04 Jul 2022 15:14    TPro  8.1  /  Alb  4.3  /  TBili  0.6  /  DBili  x   /  AST  25  /  ALT  28  /  AlkPhos  99  07-04        RADIOLOGY & ADDITIONAL TESTS: Reviewed.

## 2022-07-04 NOTE — ED PROVIDER NOTE - OBJECTIVE STATEMENT
62F former smoker PMH asthma (never intubated - contrary to triage), ILD, diverticulitis, PSxH cholecystectomy, lung biopsy (not malignant),  X 1 p/w SOB. C/o cough x2d, SOB and intermittent vague CP since yesterday. Feels similar to prior episodes attributed to asthma. Mild rhinorrhea and sore throat. Does not follow w/ pulm. Not on any daily asthma meds. No other systemic symptoms.   Denies HA, nausea, vomiting, diarrhea, lightheaded, diaphoresis, palpitations, black stool, bloody stool, LE pain, LE swelling, focal weakness/numbness, recent travel/immobilization, abd pain, urinary complaints, f/c.

## 2022-07-04 NOTE — H&P ADULT - PROBLEM SELECTOR PLAN 2
Pt claims to have had reduced tolerance to physical activity for the last few months. She is unable to climb 3/4 flight of stairs/ street blocks, without having to stop to rest and catch her breath.   - CXR for pulmonary congestion  - TTE   - pro-BNP Soft tissue nodule in left breast lower outer quadrant identified on CT chest. Unchanged.  -f/u outpatient mammography.

## 2022-07-04 NOTE — H&P ADULT - NSICDXPASTMEDICALHX_GEN_ALL_CORE_FT
PAST MEDICAL HISTORY:  Asthma     Fibroid      PAST MEDICAL HISTORY:  Asthma Pt has been diagnosed with asthma since 2016. Her asthma has been fully controlled with Albuterol rescue inhaler as home medication. She has had a few hospitalizations for Asthma exacerbation with the last one in 2020. Per Pt she uses her inhaler 3-5 times a week and is woken up x2 a month, requiring inhaler use.    Diverticulitis Pt was previously hospitalized for diverticulities. She required no surgical interventions. Her conditions was improved with antibiotics    Fibroid

## 2022-07-04 NOTE — H&P ADULT - ATTENDING COMMENTS
#Hypoxic respiratory failure: likely 2/2 asthma exacerbation. Hx of hypersensitivity pneumonitis, Asthma, p/w URI symptoms, followed by SOB, +diffusely wheezing on exam. s/p dounebs, solumedrol in ED w/ improvement of symptoms. Currently comfortable on 2L NC, euvolemic. CXR w/o infiltrate. Ddimer neg. Likely 2/2 asthma exacerbation, less likely 2/2 hypersensitivity pneumonitis. c/w xopenox given tachycardia, prednisone. Monitor resp status, consider pulm    #asthma exacerbation: plan as above.    #CP: mid chest, non radiating. Worsened by cough/deep inspiration. EKG sinus tachycardia, non ischemic. Trops neg x2. Likely costochondritis. Continue to monitor.

## 2022-07-04 NOTE — ED PROVIDER NOTE - PHYSICAL EXAMINATION
resp: diffuse expiratory wheeze, scattered rhonchi  no LE edema, normal equal distal pulses, steady unassisted gait.

## 2022-07-04 NOTE — H&P ADULT - NSHPSOCIALHISTORY_GEN_ALL_CORE
Tobacco use: Quit 30 years ago, would smoke less than 1/2 pack a week  EtOH use: None  Illicit drug use: None  Living situation: Pt lives with her daughter to prevent exposure to mold at her own house.   Work: pt is retired  ADLs: Pt is able to carry out all her ADLs.

## 2022-07-04 NOTE — H&P ADULT - HISTORY OF PRESENT ILLNESS
HPI:   62F former smoker w/ PMHx of Asthma with multiple hx for exacerbations, no hx of intubations, last admitted July 2020), presents for SOB, chest tightness, and non-productive cough x 2weeks. Patient follows outpatient with Dr. Brown, however last seen in Sept 2020. Per patient, this episode is worse than previous hospitalizations. Albuterol has provided minimal relief for a short duration. Of note, patient endorsed having mold along the walls and ceiling of her bathroom Denies any allergies, new pets, recent travel, or sick contacts.      ED Course:  Vitals: 97.8 F, , /82, RR 20(96%) on Room air   Labs: No leukocytosis H/H stable, VBG 7.37/48/<33/28. COVID, RSV, influenza neg  Imaging: CXR - negative  EKG: Sinus tachycardia  Consults: None  Interventions: HCTZ 25mg PO x1, CTX 1g IVPB, Methylprednisolone 60mg IVP x1, Zofran 5mg IVP x1, 1L NS, Duoneb x1 HPI:   62F former smoker w/ PMHx of Asthma (2016) with multiple hx for exacerbations, no hx of intubations, last admitted July 2020), presents for SOB, chest tightness, and non-productive cough x 2weeks. Patient follows outpatient with Dr. Brown, however last seen in Sept 2020. Pt only uses a rescue albuterol inhaler with no other asthma medications. Per pt, on baseline she takes her inhaler x3-5 a week and wakes up about x2 a month for inhaler use.  Per patient, this episode is worse than previous hospitalizations. Albuterol has provided minimal relief for a short duration. Pt endorses a URI for the last 3 days that gradually worsened her SOB. Additionally, she endorsed a sharp chest pain with radiation left arm. Per pt, for the last few months, she is unable to climb more than 3/4 flights of stairs or walk 3 blocks without needing to rest. Of note, patient endorsed having mold along the walls and ceiling of her bathroom Pt is known to be allergic to contrast, has no new pets, recent travel, or sick contacts.      ED Course:  Vitals: 97.8 F, , /82, RR 20(96%) on Room air   Labs: No leukocytosis H/H stable, VBG 7.37/48/<33/28. COVID, RSV, influenza neg  Imaging: CXR - negative  EKG: Sinus tachycardia  Consults: None  Interventions: HCTZ 25mg PO x1, CTX 1g IVPB, Methylprednisolone 60mg IVP x1, Zofran 5mg IVP x1, 1L NS, Duoneb x1 HPI:   62F former smoker w/ PMHx of Asthma (2016) with multiple hx for exacerbations, no hx of intubations, last admitted July 2020), presents for SOB, chest tightness, and non-productive cough x 2weeks. Patient follows outpatient with Dr. Brown, however last seen in Sept 2020. Pt only uses a rescue albuterol inhaler with no other asthma medications. Per pt, on baseline she takes her inhaler x3-5 a week and wakes up about x2 a month for inhaler use.  Per patient, this episode is worse than previous hospitalizations. Albuterol has provided minimal relief for a short duration. Pt endorses a URI for the last 3 days that gradually worsened her SOB. Additionally, she endorsed a sharp chest pain with radiation left arm. Per pt, for the last few months, she is unable to climb more than 3/4 flights of stairs or walk 3 blocks without needing to rest. Of note, patient endorsed having mold along the walls and ceiling of her bathroom Pt is known to be allergic to contrast, has no new pets, recent travel, or sick contacts.      ED Course:  Vitals: 97.8 F, , /82, RR 20(96%) on Room air   Labs: No leukocytosis H/H stable, VBG 7.37/48/<33/28. COVID, RSV, influenza neg  Imaging: CXR - negative  EKG: Sinus tachycardia  Consults: None  Interventions: Solumedrol 125mg IVP x1, Duonebs x4, Tylenol 650mg x1, Mag 2mg IVP x1

## 2022-07-04 NOTE — ED PROVIDER NOTE - CLINICAL SUMMARY MEDICAL DECISION MAKING FREE TEXT BOX
62F PMH asthma (never intubated - contrary to triage), ILD, diverticulitis, PSxH cholecystectomy, lung biopsy (not malignant),  X 1 p/w SOB. C/o cough x2d, SOB and intermittent vague CP since yesterday. Feels similar to prior episodes attributed to asthma. Mild rhinorrhea and sore throat. Does not follow w/ pulm. Not on any daily asthma meds. No other systemic symptoms.   Mild tachycardia, other vitals wnl. Exam as above. No resp distress.   ddx: Likely RAD, possibly triggered by URI. Very low suspicion ACS.   Labs, CXR, IVf/symptom control, reassess.

## 2022-07-04 NOTE — H&P ADULT - NSHPPHYSICALEXAM_GEN_ALL_CORE
T(C): 36.7 (07-04-22 @ 18:08), Max: 36.8 (07-04-22 @ 16:18)  HR: 109 (07-04-22 @ 18:08) (105 - 114)  BP: 138/80 (07-04-22 @ 18:08) (120/76 - 138/80)  RR: 18 (07-04-22 @ 18:08) (18 - 20)  SpO2: 96% (07-04-22 @ 18:08) (88% - 96%)    General: NAD, laying in bed, speaking in full sentences  HEENT: head NC/AT, no conjunctival injection, EOMI, MMM  Neck: supple, no JVD  Cardio: RRR, +S1/S2, no M/R/G  Resp: lungs CTAB, no cough/wheezes/rales/rhonchi  Abdo: soft, NT, ND, +bowel sounds x4, no organomegaly or palpable mass    Extremities: WWP, no edema/cyanosis/clubbing   Vasc: 2+ radial and DP pulses b/l  Neuro: A&Ox3  Psych: speech non-pressured, thoughts goal-oriented  Skin: dry, intact, no visible jaundice   MSK: no joint swelling T(C): 36.7 (07-04-22 @ 18:08), Max: 36.8 (07-04-22 @ 16:18)  HR: 109 (07-04-22 @ 18:08) (105 - 114)  BP: 138/80 (07-04-22 @ 18:08) (120/76 - 138/80)  RR: 18 (07-04-22 @ 18:08) (18 - 20)  SpO2: 96% (07-04-22 @ 18:08) (88% - 96%)    General: NAD, laying in bed, speaking in full sentences  HEENT: head NC/AT, no conjunctival injection, EOMI, MMM  Neck: supple, no JVD  Cardio: RRR, +S1/S2, no M/R/G  Resp: Bilateral wheezes heard on upper and lower lung fields   Abdo: soft, NT, ND, +bowel sounds x4, no organomegaly or palpable mass    Extremities: +1 pitting edema on lower extremities  Vasc: 2+ radial and DP pulses b/l   Neuro: A&Ox3  Psych: speech non-pressured, thoughts goal-oriented  Skin: dry, intact, no visible jaundice   MSK: no joint swelling

## 2022-07-04 NOTE — H&P ADULT - ASSESSMENT
62y,o F w/ PMHx of Asthma, ILD, diverticulities presented to the ED for SOB and chest pain. She is currently admitted under diagnosis of hypoxia due to acute exacerbation of asthma.

## 2022-07-04 NOTE — H&P ADULT - PROBLEM SELECTOR PLAN 4
Plan:  F: None  E: replete K<4, Mg<2  N: regular  VTE Prophylaxis: None  GI: home pepcid  C: Full Code  D: [facility]

## 2022-07-04 NOTE — H&P ADULT - PROBLEM SELECTOR PLAN 1
Pt presented to the ED for a two day history of SOB and difficulty breathing. Pt has a history of asthma, first diagnosed in 2016, controlled with her albuterol rescue inhaler. She has hx of previous hospitalization due to acute asthma exacerbation with last one in 2020. Pt had a bout of URI which she believes to have precipitated her asthma exacerbation.    - Monitor SpO2 sat  - Start pt on prednisone and f/u w/ taper  - obtain a set of URI PCR panel Pt presented to the ED for a two day history of SOB and difficulty breathing. Pt has a history of asthma, first diagnosed in 2016, controlled with her albuterol rescue inhaler. She has hx of previous hospitalization due to acute asthma exacerbation with last one in 2020. Pt had a bout of URI which she believes to have precipitated her asthma exacerbation.    - Monitor SpO2 sat  - S/p Solumedrol 125mg IVP   - c/w Prednisone 40mg for 5 day total course (-7/8)  - c/w Duonebs q6h  - RSV panel negative

## 2022-07-04 NOTE — ED PROVIDER NOTE - PROGRESS NOTE DETAILS
[place]: labs grossly wnl. flu/COVId neg. CXR wnl. Pt states her breathing improved, however still has diffuse expiratory wheeze. Additionally, pt intermittently satting 88% RA at rest w/ good waveform. Improved w/ NC. Will admit for further care. Likely related to reactive airway disease/ILD. Updated pt.

## 2022-07-04 NOTE — H&P ADULT - PROBLEM SELECTOR PLAN 3
Patient with known history of diverticulitis. She was hospitalized which did not require surgical interventions. She was treated with Antibiotics.   - f/u outpatient

## 2022-07-04 NOTE — ED ADULT NURSE NOTE - OBJECTIVE STATEMENT
PT is a 61 y/o female A&Ox4 in NAD ambulatory with steady gait c/o asthma exacerbation. PT reports SOB, nasal congestion, sore throat. Pt denies hx of intubation despite triage note, N/V/D, fever/chills, h/a. Pt talking in clear, full sentences. EKG done and signed.

## 2022-07-05 VITALS — HEART RATE: 97 BPM | OXYGEN SATURATION: 93 % | RESPIRATION RATE: 18 BRPM

## 2022-07-05 DIAGNOSIS — R09.02 HYPOXEMIA: ICD-10-CM

## 2022-07-05 DIAGNOSIS — J67.9 HYPERSENSITIVITY PNEUMONITIS DUE TO UNSPECIFIED ORGANIC DUST: ICD-10-CM

## 2022-07-05 LAB
A1C WITH ESTIMATED AVERAGE GLUCOSE RESULT: 6.3 % — HIGH (ref 4–5.6)
ALBUMIN SERPL ELPH-MCNC: 4.1 G/DL — SIGNIFICANT CHANGE UP (ref 3.3–5)
ALP SERPL-CCNC: 91 U/L — SIGNIFICANT CHANGE UP (ref 40–120)
ALT FLD-CCNC: 27 U/L — SIGNIFICANT CHANGE UP (ref 10–45)
ANION GAP SERPL CALC-SCNC: 14 MMOL/L — SIGNIFICANT CHANGE UP (ref 5–17)
AST SERPL-CCNC: 19 U/L — SIGNIFICANT CHANGE UP (ref 10–40)
BASOPHILS # BLD AUTO: 0.01 K/UL — SIGNIFICANT CHANGE UP (ref 0–0.2)
BASOPHILS NFR BLD AUTO: 0.2 % — SIGNIFICANT CHANGE UP (ref 0–2)
BILIRUB SERPL-MCNC: 0.3 MG/DL — SIGNIFICANT CHANGE UP (ref 0.2–1.2)
BUN SERPL-MCNC: 12 MG/DL — SIGNIFICANT CHANGE UP (ref 7–23)
CALCIUM SERPL-MCNC: 8.7 MG/DL — SIGNIFICANT CHANGE UP (ref 8.4–10.5)
CHLORIDE SERPL-SCNC: 105 MMOL/L — SIGNIFICANT CHANGE UP (ref 96–108)
CK MB CFR SERPL CALC: 1.3 NG/ML — SIGNIFICANT CHANGE UP (ref 0–6.7)
CK SERPL-CCNC: 52 U/L — SIGNIFICANT CHANGE UP (ref 25–170)
CO2 SERPL-SCNC: 18 MMOL/L — LOW (ref 22–31)
CREAT SERPL-MCNC: 0.59 MG/DL — SIGNIFICANT CHANGE UP (ref 0.5–1.3)
D DIMER BLD IA.RAPID-MCNC: <150 NG/ML DDU — SIGNIFICANT CHANGE UP
EGFR: 102 ML/MIN/1.73M2 — SIGNIFICANT CHANGE UP
EOSINOPHIL # BLD AUTO: 0.01 K/UL — SIGNIFICANT CHANGE UP (ref 0–0.5)
EOSINOPHIL NFR BLD AUTO: 0.2 % — SIGNIFICANT CHANGE UP (ref 0–6)
ESTIMATED AVERAGE GLUCOSE: 134 MG/DL — HIGH (ref 68–114)
GLUCOSE SERPL-MCNC: 191 MG/DL — HIGH (ref 70–99)
HCT VFR BLD CALC: 38.8 % — SIGNIFICANT CHANGE UP (ref 34.5–45)
HGB BLD-MCNC: 13.1 G/DL — SIGNIFICANT CHANGE UP (ref 11.5–15.5)
IMM GRANULOCYTES NFR BLD AUTO: 1.2 % — SIGNIFICANT CHANGE UP (ref 0–1.5)
LYMPHOCYTES # BLD AUTO: 1.02 K/UL — SIGNIFICANT CHANGE UP (ref 1–3.3)
LYMPHOCYTES # BLD AUTO: 15.6 % — SIGNIFICANT CHANGE UP (ref 13–44)
MAGNESIUM SERPL-MCNC: 2.4 MG/DL — SIGNIFICANT CHANGE UP (ref 1.6–2.6)
MCHC RBC-ENTMCNC: 29.9 PG — SIGNIFICANT CHANGE UP (ref 27–34)
MCHC RBC-ENTMCNC: 33.8 GM/DL — SIGNIFICANT CHANGE UP (ref 32–36)
MCV RBC AUTO: 88.6 FL — SIGNIFICANT CHANGE UP (ref 80–100)
MONOCYTES # BLD AUTO: 0.35 K/UL — SIGNIFICANT CHANGE UP (ref 0–0.9)
MONOCYTES NFR BLD AUTO: 5.4 % — SIGNIFICANT CHANGE UP (ref 2–14)
NEUTROPHILS # BLD AUTO: 5.05 K/UL — SIGNIFICANT CHANGE UP (ref 1.8–7.4)
NEUTROPHILS NFR BLD AUTO: 77.4 % — HIGH (ref 43–77)
NRBC # BLD: 0 /100 WBCS — SIGNIFICANT CHANGE UP (ref 0–0)
PHOSPHATE SERPL-MCNC: 1.5 MG/DL — LOW (ref 2.5–4.5)
PLATELET # BLD AUTO: 270 K/UL — SIGNIFICANT CHANGE UP (ref 150–400)
POTASSIUM SERPL-MCNC: 4.3 MMOL/L — SIGNIFICANT CHANGE UP (ref 3.5–5.3)
POTASSIUM SERPL-SCNC: 4.3 MMOL/L — SIGNIFICANT CHANGE UP (ref 3.5–5.3)
PROT SERPL-MCNC: 7.8 G/DL — SIGNIFICANT CHANGE UP (ref 6–8.3)
RBC # BLD: 4.38 M/UL — SIGNIFICANT CHANGE UP (ref 3.8–5.2)
RBC # FLD: 12.2 % — SIGNIFICANT CHANGE UP (ref 10.3–14.5)
SODIUM SERPL-SCNC: 137 MMOL/L — SIGNIFICANT CHANGE UP (ref 135–145)
TROPONIN T SERPL-MCNC: 0.01 NG/ML — SIGNIFICANT CHANGE UP (ref 0–0.01)
WBC # BLD: 6.52 K/UL — SIGNIFICANT CHANGE UP (ref 3.8–10.5)
WBC # FLD AUTO: 6.52 K/UL — SIGNIFICANT CHANGE UP (ref 3.8–10.5)

## 2022-07-05 PROCEDURE — 82803 BLOOD GASES ANY COMBINATION: CPT

## 2022-07-05 PROCEDURE — 84295 ASSAY OF SERUM SODIUM: CPT

## 2022-07-05 PROCEDURE — 83036 HEMOGLOBIN GLYCOSYLATED A1C: CPT

## 2022-07-05 PROCEDURE — 93005 ELECTROCARDIOGRAM TRACING: CPT

## 2022-07-05 PROCEDURE — 80053 COMPREHEN METABOLIC PANEL: CPT

## 2022-07-05 PROCEDURE — 96375 TX/PRO/DX INJ NEW DRUG ADDON: CPT

## 2022-07-05 PROCEDURE — 84100 ASSAY OF PHOSPHORUS: CPT

## 2022-07-05 PROCEDURE — 83735 ASSAY OF MAGNESIUM: CPT

## 2022-07-05 PROCEDURE — 94640 AIRWAY INHALATION TREATMENT: CPT

## 2022-07-05 PROCEDURE — 71045 X-RAY EXAM CHEST 1 VIEW: CPT

## 2022-07-05 PROCEDURE — 85379 FIBRIN DEGRADATION QUANT: CPT

## 2022-07-05 PROCEDURE — 87637 SARSCOV2&INF A&B&RSV AMP PRB: CPT

## 2022-07-05 PROCEDURE — 82550 ASSAY OF CK (CPK): CPT

## 2022-07-05 PROCEDURE — 82553 CREATINE MB FRACTION: CPT

## 2022-07-05 PROCEDURE — 84132 ASSAY OF SERUM POTASSIUM: CPT

## 2022-07-05 PROCEDURE — 85025 COMPLETE CBC W/AUTO DIFF WBC: CPT

## 2022-07-05 PROCEDURE — 84484 ASSAY OF TROPONIN QUANT: CPT

## 2022-07-05 PROCEDURE — 99233 SBSQ HOSP IP/OBS HIGH 50: CPT | Mod: GC

## 2022-07-05 PROCEDURE — 99285 EMERGENCY DEPT VISIT HI MDM: CPT | Mod: 25

## 2022-07-05 PROCEDURE — 99254 IP/OBS CNSLTJ NEW/EST MOD 60: CPT | Mod: GC

## 2022-07-05 PROCEDURE — 96365 THER/PROPH/DIAG IV INF INIT: CPT

## 2022-07-05 PROCEDURE — 96361 HYDRATE IV INFUSION ADD-ON: CPT

## 2022-07-05 PROCEDURE — 82330 ASSAY OF CALCIUM: CPT

## 2022-07-05 PROCEDURE — 36415 COLL VENOUS BLD VENIPUNCTURE: CPT

## 2022-07-05 RX ORDER — IPRATROPIUM/ALBUTEROL SULFATE 18-103MCG
3 AEROSOL WITH ADAPTER (GRAM) INHALATION EVERY 6 HOURS
Refills: 0 | Status: DISCONTINUED | OUTPATIENT
Start: 2022-07-05 | End: 2022-07-05

## 2022-07-05 RX ORDER — BUDESONIDE AND FORMOTEROL FUMARATE DIHYDRATE 160; 4.5 UG/1; UG/1
2 AEROSOL RESPIRATORY (INHALATION)
Qty: 1 | Refills: 0
Start: 2022-07-05 | End: 2022-08-03

## 2022-07-05 RX ORDER — ALBUTEROL 90 UG/1
1 AEROSOL, METERED ORAL EVERY 6 HOURS
Refills: 0 | Status: DISCONTINUED | OUTPATIENT
Start: 2022-07-05 | End: 2022-07-05

## 2022-07-05 RX ORDER — LEVALBUTEROL 1.25 MG/.5ML
0.63 SOLUTION, CONCENTRATE RESPIRATORY (INHALATION) EVERY 6 HOURS
Refills: 0 | Status: DISCONTINUED | OUTPATIENT
Start: 2022-07-05 | End: 2022-07-05

## 2022-07-05 RX ORDER — SODIUM,POTASSIUM PHOSPHATES 278-250MG
1 POWDER IN PACKET (EA) ORAL ONCE
Refills: 0 | Status: COMPLETED | OUTPATIENT
Start: 2022-07-05 | End: 2022-07-05

## 2022-07-05 RX ADMIN — Medication 3 MILLILITER(S): at 01:25

## 2022-07-05 RX ADMIN — Medication 85 MILLIMOLE(S): at 11:48

## 2022-07-05 RX ADMIN — Medication 40 MILLIGRAM(S): at 06:48

## 2022-07-05 RX ADMIN — Medication 3 MILLILITER(S): at 17:05

## 2022-07-05 RX ADMIN — LEVALBUTEROL 0.63 MILLIGRAM(S): 1.25 SOLUTION, CONCENTRATE RESPIRATORY (INHALATION) at 05:52

## 2022-07-05 RX ADMIN — ALBUTEROL 1 PUFF(S): 90 AEROSOL, METERED ORAL at 16:06

## 2022-07-05 RX ADMIN — Medication 3 MILLILITER(S): at 11:47

## 2022-07-05 NOTE — PROGRESS NOTE ADULT - SUBJECTIVE AND OBJECTIVE BOX
HAMZAH SIERRA, 62y, Female  [id number]  Patient is a 62y old  Female who presents with a chief complaint of shortness of breath (05 Jul 2022 14:17)    OVERNIGHT EVENTS: The patient reported palpitations following DuoNebs treatment. She was tachycardic to the 110s, which resolved on its own.     SUBJECTIVE: The patient was seen and evaluated at bedside. She described persistent chest tightness and non-productive cough. She felt comfortable on room air without shortness of breath at rest. She also noted some upper respiratory congestion. She denied fevers, chills, abdominal pain, N/V/D.     12 Point ROS Negative unless noted otherwise above.  -------------------------------------------------------------------------------  VITAL SIGNS:  Vital Signs Last 24 Hrs  T(C): 36.3 (05 Jul 2022 15:15), Max: 37 (05 Jul 2022 05:40)  T(F): 97.4 (05 Jul 2022 15:15), Max: 98.6 (05 Jul 2022 05:40)  HR: 106 (05 Jul 2022 15:15) (89 - 114)  BP: 126/80 (05 Jul 2022 15:15) (114/66 - 144/86)  BP(mean): --  RR: 18 (05 Jul 2022 15:15) (18 - 20)  SpO2: 94% (05 Jul 2022 15:15) (88% - 96%)    PHYSICAL EXAM:  General: NAD, resting comfortably on RA  HEENT: NC/AT; EOMI, anicteric sclera; moist mucosal membranes.  Neck: supple, trachea midline  Cardiovascular: RRR, +S1/S2; NO M/R/G  Respiratory: wheezing in bilateral mid and lower lung fields; cough with deep inspiration  Gastrointestinal: soft, NT/ND; +BSx4  Extremities: WWP; no edema or cyanosis  Vascular: 2+ radial, DP/PT pulses B/L  Neurological: AAOx3; no focal deficits    ALLERGIES:  IV Contrast (Short breath; Angioedema)    MEDICATIONS:  MEDICATIONS  (STANDING):  albuterol/ipratropium for Nebulization 3 milliLiter(s) Nebulizer every 6 hours  potassium phosphate / sodium phosphate Powder (PHOS-NaK) 1 Packet(s) Oral once  predniSONE   Tablet 40 milliGRAM(s) Oral daily    MEDICATIONS  (PRN):  acetaminophen     Tablet .. 650 milliGRAM(s) Oral every 6 hours PRN Temp greater or equal to 38C (100.4F), Mild Pain (1 - 3)  ALBUTerol    90 MICROgram(s) HFA Inhaler 1 Puff(s) Inhalation every 6 hours PRN Shortness of Breath and/or Wheezing  -------------------------------------------------------------------------------  LABS:                        13.1   6.52  )-----------( 270      ( 05 Jul 2022 04:08 )             38.8     07-05    137  |  105  |  12  ----------------------------<  191<H>  4.3   |  18<L>  |  0.59    Ca    8.7      05 Jul 2022 04:08  Phos  1.5     07-05  Mg     2.4     07-05    TPro  7.8  /  Alb  4.1  /  TBili  0.3  /  DBili  x   /  AST  19  /  ALT  27  /  AlkPhos  91  07-05    LIVER FUNCTIONS - ( 05 Jul 2022 04:08 )  Alb: 4.1 g/dL / Pro: 7.8 g/dL / ALK PHOS: 91 U/L / ALT: 27 U/L / AST: 19 U/L / GGT: x           CAPILLARY BLOOD GLUCOSE    COVID-19 PCR: Negative (27 Feb 2022 06:35)    RADIOLOGY & ADDITIONAL TESTS: Reviewed.

## 2022-07-05 NOTE — CONSULT NOTE ADULT - ATTENDING COMMENTS
Consulted for dyspnea, wheezing. Interesting case of a woman remotely known to me in whom at that time we suspected HP based on exposure plus imaging and BAL, though we did not have biopsy confirmation of this diagnosis. PFT at that time showed restriction and low DLCO and not airflow obstruction. We initially treated her with steroids but she then lost insurance and was lost to follow up. She returns now with dyspnea and reported wheezing on exam and has already markedly improved on steroids and bronchodilators. Her CXR is clear now, as opposed to the infiltrates previously seen when we diagnosed her with HP. This seems more like an asthma exacerbation. Would cont w steroids and bronchodilators, hope for early discharge on Symbicort, and I will follow up in the office with lung functions.

## 2022-07-05 NOTE — PROGRESS NOTE ADULT - PROBLEM SELECTOR PLAN 4
Plan:  F: None  E: replete K<4, Mg<2  N: regular  VTE Prophylaxis: None  GI: home pepcid  C: Full Code  D: [facility]

## 2022-07-05 NOTE — PROGRESS NOTE ADULT - PROBLEM SELECTOR PLAN 2
Soft tissue nodule in left breast lower outer quadrant identified on CT chest. Unchanged.  -f/u outpatient mammography.

## 2022-07-05 NOTE — DISCHARGE NOTE PROVIDER - HOSPITAL COURSE
#Discharge: do not delete    Patient is a 63 yo F with past medical history of asthma who presented with shortness of breath, chest tightness, and non-productive cough  found to have asthma exacerbation 2/2 URI.     Hospital course (by problem):   #Asthma exacerbation   Patient presented with shortness of breath, chest tightness, and non-productive cough in the setting of known asthma and concurrent URI for the past 3 days. She was afebrile, tachycardic to 114, normotensive, and saturating 96% on RA. In the ED, she was given solumedrol 125mg IVP x1, DuoNebs x4, Tylenol 650mg x1, and Mag 2mg IVPx1 in the ED. Her chest x-ray was negative and her EKG showed sinus tachycardia. COVID, RSV, and influenza were negative. Upon admission to the floor she was breathing comfortably on RA, saturating 95%. She was tachycardic to the 110s following DuoNebs treatment, so she was transitioned to Xopenex, but she experienced discomfort in her mouth with Xopenex.      Patient was discharged to: home     New medications: none  Changes to old medications: none  Medications that were stopped: none    Items to follow up as outpatient:  -Asthma vs. Hypersensitivity pneumonitis (obtain PFTs)    Physical exam at the time of discharge:  General: NAD, laying in bed, speaking in full sentences  HEENT: head NC/AT, no conjunctival injection, EOMI, MMM  Neck: supple, no JVD  Cardio: RRR, +S1/S2, no M/R/G  Resp: Bilateral wheezes heard on mid and lower lung fields   Abdo: soft, NT, ND, +bowel sounds x4  Extremities: +1 pitting edema on lower extremities  Vasc: 2+ radial and DP pulses b/l   Neuro: A&Ox3  Psych: speech non-pressured, thoughts goal-oriented  Skin: dry, intact, no visible jaundice   MSK: no joint swelling #Discharge: do not delete    Patient is a 61 yo F with past medical history of hypersensitivity pneumonitis who presented with shortness of breath, chest tightness, and non-productive cough found to have URI and exacerbation of known hypersensitivity pneumonitis     Hospital course (by problem):   #Asthma exacerbation   Patient presented with shortness of breath, chest tightness, and non-productive cough in the setting of known asthma and concurrent URI for the past 3 days. She was afebrile, tachycardic to 114, normotensive, and saturating 96% on RA. In the ED, she was given solumedrol 125mg IVP x1, DuoNebs x4, Tylenol 650mg x1, and Mag 2mg IVPx1 in the ED. Her chest x-ray was negative and her EKG showed sinus tachycardia. COVID, RSV, and influenza were negative. Upon admission to the floor she was breathing comfortably on RA, saturating 95%. She was tachycardic to the 110s following DuoNebs treatment, so she was transitioned to Xopenex, but she experienced discomfort in her mouth with Xopenex.      Patient was discharged to: home     New medications: none  Changes to old medications: none  Medications that were stopped: none    Items to follow up as outpatient:  -Asthma vs. Hypersensitivity pneumonitis (obtain PFTs)    Physical exam at the time of discharge:  General: NAD, laying in bed, speaking in full sentences  HEENT: head NC/AT, no conjunctival injection, EOMI, MMM  Neck: supple, no JVD  Cardio: RRR, +S1/S2, no M/R/G  Resp: Bilateral wheezes heard on mid and lower lung fields   Abdo: soft, NT, ND, +bowel sounds x4  Extremities: +1 pitting edema on lower extremities  Vasc: 2+ radial and DP pulses b/l   Neuro: A&Ox3  Psych: speech non-pressured, thoughts goal-oriented  Skin: dry, intact, no visible jaundice   MSK: no joint swelling

## 2022-07-05 NOTE — PROGRESS NOTE ADULT - ATTENDING COMMENTS
62y,o F w/ PMHx of hypersensitivity pneumonitis 2/2 mold exposure, current URI, diverticulitis presented to the ED with acute hypoxic respiratory failure    #AHRF  - patient with reported dx of asthma but outpatient spirometry reviewed in [facility] and notable for restrictive disease without bronchodilator response, dx with hypersensitivity pneumonitis and was to complete long taper of prednisone but did not follow up due to insurance change  - current symptoms likely related to URI exacerbating underlying lung disease  - pulm consult given hx of hypersensitivity pneumonitis to determine if should treat with longer course of prednisone vs if more consistent with asthma exacerbation despite no spirometry confirming asthma. Appreciate recs.  Patient previously on ICS controller with Breo Ellipta which was stopped when diagnosis of hypersensitivity pneumonitis made  - Weaned to RA this afternoon with ambulatory sats 93-95%    Remainder of plan as above    Dispo: DC today vs tomorrow pending improvement, will give information to establish with [facility] given no current PCP for HCM including mammogram

## 2022-07-05 NOTE — DISCHARGE NOTE PROVIDER - NSDCCPCAREPLAN_GEN_ALL_CORE_FT
PRINCIPAL DISCHARGE DIAGNOSIS  Diagnosis: Exacerbation of reactive airway disease  Assessment and Plan of Treatment: You came to the emergency room with shortness of breath, chest tightness, and a cough. You have a known history of asthma and had a recent upper respiratory infection (sinus infection). We think that the sinus infection and the mold in your bathroom may have both contributed to your asthma exacerbation. We treated you with breathing treatments called DuoNebs and steroids (solumedrol). You will continue to take the steroids until July 8th. Before you were discharged, you were no longer requiring supplemental oxygen through the nasal cannula and your oxygen levels were good. You also mentioned you were having some chest tightness, so we did an EKG to look at your heart. The EKG showed that your heart beat was fast, but nothing else concerning.

## 2022-07-05 NOTE — CONSULT NOTE ADULT - ASSESSMENT
Patient is a 61yo F, former smoker with 5pk/yr smoking history, quit 30 years ago, w/ PMHx of Asthma and hypersensitivity pneumonitis who presents  with SOB, chest tightness, and non-productive cough, admitted for asthma exacerbation likely from viral URI, pulmonary consulted for concern for need for prolonged steroid course with history of HP.     #Acute hypoxic respiratory failure  #Asthma exacerbation  #URI    ***Incomplete Note*** Patient is a 63yo F, former smoker with 5pk/yr smoking history, quit 30 years ago, w/ PMHx of Asthma and hypersensitivity pneumonitis who presents  with SOB, chest tightness, and non-productive cough, admitted for asthma exacerbation likely from viral URI, pulmonary consulted for concern for need for prolonged steroid course with history of HP.     #Acute hypoxic respiratory failure  #Asthma exacerbation  #URI    Patient presenting with SOB, acute hypoxic respiratory failure, productive cough, rhinitis w/o fevers, chills or elevated WBC. Per documentation she was wheezing at presentation and now has significant improvement with nebs and prednisone with absence of wheezing and resolution of hypoxia. Her presentation is c/w asthma exacerbation 2/2 URI,  Patient is a 63yo F, former smoker with 5pk/yr smoking history, quit 30 years ago, w/ PMHx of Asthma and hypersensitivity pneumonitis who presents  with SOB, chest tightness, and non-productive cough, admitted for asthma exacerbation likely from viral URI, pulmonary consulted for concern for need for prolonged steroid course with history of HP.     #Acute hypoxic respiratory failure  #Asthma exacerbation  #URI    Patient presenting with SOB, acute hypoxic respiratory failure, productive cough, rhinitis w/o fevers, chills or elevated WBC. Per documentation she was wheezing at presentation and now has significant improvement with nebs and prednisone with absence of wheezing and resolution of hypoxia. Her presentation is c/w asthma exacerbation 2/2 URI, as is her quick response to treatment. She had an outpatient PFT that showed no obstruction or BD response, but she was on steroids at the time for HP, so it is unclear at this time if she does have asthma. She was also never officially diagnosed with HP as there was no lung parenchymal tissue on the TBBx, but the CT chest, BAL and response to steroids is consistent with this diagnosis.     Recommend the following:  -c/w prednisone 40mg daily for 5d for asthma exacerbation, no indication for prolonged course at this time  -c/w duonebs while inpatient  -recommend she is started on symbicort on discharge for asthma  -please have patient follow up with Dr. sapp following discharge at [address] [place] [address] and make appointment prior do discharge    Pulm will follow. s/e/d with Dr. Sapp.

## 2022-07-05 NOTE — DISCHARGE NOTE PROVIDER - PROVIDER TOKENS
PROVIDER:[TOKEN:[[id number]],SCHEDULEDAPPT:[08/03/2022],SCHEDULEDAPPTTIME:[03:00 PM],ESTABLISHEDPATIENT:[T]] PROVIDER:[TOKEN:[[id number]],SCHEDULEDAPPT:[08/03/2022],SCHEDULEDAPPTTIME:[03:00 PM],ESTABLISHEDPATIENT:[T]],PROVIDER:[TOKEN:[[id number]]]

## 2022-07-05 NOTE — CONSULT NOTE ADULT - SUBJECTIVE AND OBJECTIVE BOX
PULMONARY SERVICE INITIAL CONSULT NOTE    HPI:  Patient is a 61yo F, former smoker with 5pk/yr smoking history, quit 30 years ago, w/ PMHx of Asthma and hypersensitivity pneumonitis who presents  with SOB, chest tightness, and non-productive cough. She states that she has felt significantly more SOB over past few months with ET limited to about 3 blocks and 4 days prior to presentation, developed chest tightness with cough, productive of clear to yellow phlegm, nasal congestion and SOB on exertion and at rest. She used her albuterol inhaler multiple times without any significant improvement in symptoms. She states this felt different from when she usually has asthma symptoms which improves with albuterol. Her symptoms got progressively worse over the next few days so she presented to the ED. She denies any chest pain, fevers or chills, sick contacts or recent travel. She was diagnosed with asthma in  with poor symptom control and multiple exacerbations and subsequently was hospitalized in  for acute hypoxic respiratory failure with ground glass mosaicism and a lymphocytic predominant BAL and was diagnosed with HP.     ED Course:  Vitals: 97.8 F, , /82, RR 20(96%) on Room air   Labs: No leukocytosis H/H stable, VBG 7.37/48/<33/28. COVID, RSV, influenza neg  Imaging: CXR - negative  EKG: Sinus tachycardia  Consults: None  Interventions: Solumedrol 125mg IVP x1, Duonebs x4, Tylenol 650mg x1, Mag 2mg IVP x1 (2022 18:17)      REVIEW OF SYSTEMS:  All additional ROS negative.    PAST MEDICAL & SURGICAL HISTORY:  Asthma  Pt has been diagnosed with asthma since 2016. Her asthma has been fully controlled with Albuterol rescue inhaler as home medication. She has had a few hospitalizations for Asthma exacerbation with the last one in . Per Pt she uses her inhaler 3-5 times a week and is woken up x2 a month, requiring inhaler use.      Fibroid      Diverticulitis  Pt was previously hospitalized for diverticulities. She required no surgical interventions. Her conditions was improved with antibiotics      S/P cholecystectomy      S/P  section          FAMILY HISTORY:  FH: lymphoma    FH: breast cancer    Family history of renal disease        SOCIAL HISTORY:  Smoking Status: [ ] Current, [ ] Former, [ ] Never  Pack Years:    MEDICATIONS:  Pulmonary:  ALBUTerol    90 MICROgram(s) HFA Inhaler 1 Puff(s) Inhalation every 6 hours PRN  albuterol/ipratropium for Nebulization 3 milliLiter(s) Nebulizer every 6 hours    Antimicrobials:    Anticoagulants:    Onc:    GI/:    Endocrine:  predniSONE   Tablet 40 milliGRAM(s) Oral daily    Cardiac:    Other Medications:  acetaminophen     Tablet .. 650 milliGRAM(s) Oral every 6 hours PRN      Allergies    allerigc to contrast dye (Short breath; Angioedema)  IV Contrast (Short breath; Angioedema)    Intolerances        Vital Signs Last 24 Hrs  T(C): 36.7 (2022 11:13), Max: 37 (2022 05:40)  T(F): 98 (2022 11:13), Max: 98.6 (2022 05:40)  HR: 97 (2022 11:13) (89 - 114)  BP: 142/79 (2022 11:13) (114/66 - 144/86)  BP(mean): --  RR: 20 (2022 11:13) (18 - 20)  SpO2: 95% (2022 11:13) (88% - 96%)        PHYSICAL EXAM:  Constitutional: NAD  Head: NC/AT  EENT: PERRL, anicteric sclera; oropharynx clear, MMM  Neck: supple, no appreciable JVD  Respiratory: CTA B/L; no W/R/R  Cardiovascular: +S1/S2, RRR  Gastrointestinal: soft, NT/ND  Extremities: WWP; no edema, clubbing or cyanosis  Vascular: 2+ radial pulses B/L  Neurological: awake and alert; GALLOWAY    LABS:      CBC Full  -  ( 2022 04:08 )  WBC Count : 6.52 K/uL  RBC Count : 4.38 M/uL  Hemoglobin : 13.1 g/dL  Hematocrit : 38.8 %  Platelet Count - Automated : 270 K/uL  Mean Cell Volume : 88.6 fl  Mean Cell Hemoglobin : 29.9 pg  Mean Cell Hemoglobin Concentration : 33.8 gm/dL  Auto Neutrophil # : 5.05 K/uL  Auto Lymphocyte # : 1.02 K/uL  Auto Monocyte # : 0.35 K/uL  Auto Eosinophil # : 0.01 K/uL  Auto Basophil # : 0.01 K/uL  Auto Neutrophil % : 77.4 %  Auto Lymphocyte % : 15.6 %  Auto Monocyte % : 5.4 %  Auto Eosinophil % : 0.2 %  Auto Basophil % : 0.2 %        137  |  105  |  12  ----------------------------<  191<H>  4.3   |  18<L>  |  0.59    Ca    8.7      2022 04:08  Phos  1.5     07-  Mg     2.4     07-    TPro  7.8  /  Alb  4.1  /  TBili  0.3  /  DBili  x   /  AST  19  /  ALT  27  /  AlkPhos  91  07-                      RADIOLOGY & ADDITIONAL STUDIES: PULMONARY SERVICE INITIAL CONSULT NOTE    HPI:  Patient is a 61yo F, former smoker with 5pk/yr smoking history, quit 30 years ago, w/ PMHx of Asthma and hypersensitivity pneumonitis who presents  with SOB, chest tightness, and non-productive cough. She states that she has felt significantly more SOB over past few months with ET limited to about 3 blocks and 4 days prior to presentation, developed chest tightness with cough, productive of clear to yellow phlegm, nasal congestion and SOB on exertion and at rest. She used her albuterol inhaler multiple times without any significant improvement in symptoms. She states this felt different from when she usually has asthma symptoms which improves with albuterol. Her symptoms got progressively worse over the next few days so she presented to the ED. She denies any chest pain, fevers or chills, sick contacts or recent travel. She was diagnosed with asthma in 2016 with poor symptom control and multiple exacerbations and subsequently was hospitalized in  for acute hypoxic respiratory failure with ground glass mosaicism and a lymphocytic predominant BAL and was diagnosed with HP, possibly 2/2 mold in the home. She then underwent a prolonged steroid course with improvement in symptoms back to baseline, using albuterol 2-3x per month. She reports the mold was not properly treated and progressed to being in her living room which coincided with a worsening of her respiratory symptoms. She was admitted and started on prednisone and duonebs for asthma exacerbation with improvement in symptoms. Pulmonary consulted for concern for need of prolonged steroid course given history of HP.     REVIEW OF SYSTEMS:  All additional ROS negative.    PAST MEDICAL & SURGICAL HISTORY:  Asthma    Fibroid    Diverticulitis  Pt was previously hospitalized for diverticulities. She required no surgical interventions. Her conditions was improved with antibiotics      S/P cholecystectomy      S/P  section          FAMILY HISTORY:  FH: lymphoma    FH: breast cancer    Family history of renal disease        SOCIAL HISTORY:  Smoking Status: [ ] Current, [x ] Former, [ ] Never  Pack Years: 5    MEDICATIONS:  Pulmonary:  ALBUTerol    90 MICROgram(s) HFA Inhaler 1 Puff(s) Inhalation every 6 hours PRN  albuterol/ipratropium for Nebulization 3 milliLiter(s) Nebulizer every 6 hours    Antimicrobials:    Anticoagulants:    Onc:    GI/:    Endocrine:  predniSONE   Tablet 40 milliGRAM(s) Oral daily    Cardiac:    Other Medications:  acetaminophen     Tablet .. 650 milliGRAM(s) Oral every 6 hours PRN      Allergies    allerigc to contrast dye (Short breath; Angioedema)  IV Contrast (Short breath; Angioedema)    Intolerances        Vital Signs Last 24 Hrs  T(C): 36.7 (2022 11:13), Max: 37 (2022 05:40)  T(F): 98 (2022 11:13), Max: 98.6 (2022 05:40)  HR: 97 (2022 11:13) (89 - 114)  BP: 142/79 (2022 11:13) (114/66 - 144/86)  BP(mean): --  RR: 20 (2022 11:13) (18 - 20)  SpO2: 95% (2022 11:13) (88% - 96%)        PHYSICAL EXAM:  Constitutional: NAD  Head: NC/AT  EENT: PERRL, anicteric sclera; oropharynx clear, MMM  Neck: supple, no appreciable JVD  Respiratory: CTA B/L; no W/R/R  Cardiovascular: +S1/S2, RRR  Gastrointestinal: soft, NT/ND  Extremities: WWP; no edema, clubbing or cyanosis  Vascular: 2+ radial pulses B/L  Neurological: awake and alert; GALLOWAY    LABS:      CBC Full  -  ( 2022 04:08 )  WBC Count : 6.52 K/uL  RBC Count : 4.38 M/uL  Hemoglobin : 13.1 g/dL  Hematocrit : 38.8 %  Platelet Count - Automated : 270 K/uL  Mean Cell Volume : 88.6 fl  Mean Cell Hemoglobin : 29.9 pg  Mean Cell Hemoglobin Concentration : 33.8 gm/dL  Auto Neutrophil # : 5.05 K/uL  Auto Lymphocyte # : 1.02 K/uL  Auto Monocyte # : 0.35 K/uL  Auto Eosinophil # : 0.01 K/uL  Auto Basophil # : 0.01 K/uL  Auto Neutrophil % : 77.4 %  Auto Lymphocyte % : 15.6 %  Auto Monocyte % : 5.4 %  Auto Eosinophil % : 0.2 %  Auto Basophil % : 0.2 %        137  |  105  |  12  ----------------------------<  191<H>  4.3   |  18<L>  |  0.59    Ca    8.7      2022 04:08  Phos  1.5     07-05  Mg     2.4     07-05    TPro  7.8  /  Alb  4.1  /  TBili  0.3  /  DBili  x   /  AST  19  /  ALT  27  /  AlkPhos  91  07-05    RADIOLOGY & ADDITIONAL STUDIES:  CXR 2022: No infiltrate or effusion, significant improvement since prior CXR 2020

## 2022-07-05 NOTE — DISCHARGE NOTE PROVIDER - NSDCMRMEDTOKEN_GEN_ALL_CORE_FT
albuterol 90 mcg/inh inhalation aerosol: 2 puff(s) inhaled every 6 hours, As Needed  Bactrim  mg-160 mg oral tablet: 1 tab(s) orally 3 times a week, Monday, Wednesday, and Friday.  Breo Ellipta 200 mcg-25 mcg/inh inhalation powder: 1 puff(s) inhaled once a day  ondansetron 4 mg oral tablet, disintegratin tab(s) orally 3 times a day, As Needed -for nausea   predniSONE 20 mg oral tablet: 2 tab(s) orally every 24 hours  Ventolin HFA 90 mcg/inh inhalation aerosol: 2 puff(s) inhaled 4 times a day

## 2022-07-05 NOTE — PROGRESS NOTE ADULT - PROBLEM SELECTOR PLAN 1
Patient with known hx of hypersensitivity pneumonitis 2/2 mold exposure (confirmed with outpatient PFTs revealing restrictive lung disease pattern). Also notes concurrent sinus infection. Patient saturating between 93-95% on RA (7/5), including during ambulation. Peek flow 210 (7/5).   Plan:  -Incentive spirometer at bedside  -pulmonology following; appreciate recommendations  -patient treated in ED with solumedrol, duonebs, Mg. Developed tachycardia 2/2 duonebs and was transitioned to Xopenex but experienced subjective tongue swelling, so she was transitioned back to duonebs. Will continue to monitor HR  -Q6 DuoNebs  -Prednisone 40mg 5 day taper  -follow up with Dr. Brown outpatient

## 2022-07-05 NOTE — PROGRESS NOTE ADULT - ASSESSMENT
62y,o F w/ PMHx of hypersensitivity pneumonitis 2/2 mold exposure, current URI, diverticulitis presented to the ED for SOB, non-productive cough, and chest tightness. She is currently admitted under diagnosis of hypoxia due to acute exacerbation of hypersensitivity pneumonitis.

## 2022-07-05 NOTE — DISCHARGE NOTE PROVIDER - CARE PROVIDERS DIRECT ADDRESSES
,[email or web address]
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
## 2022-07-05 NOTE — DISCHARGE NOTE PROVIDER - NSDCFUSCHEDAPPT_GEN_ALL_CORE_FT
Magda Brown  [facility]  [address]  Scheduled Appointment: 08/03/2022

## 2022-07-05 NOTE — DISCHARGE NOTE PROVIDER - NSDCFUADDAPPT_GEN_ALL_CORE_FT
1. Pulmonology  You have a follow-up appointment with your pulmonologist Dr. Brown on Wednesday, August 3rd at 3:00 PM. Dr. Brown's office is located on [address] [place] [address]. Her office can be reached at [phone].

## 2022-07-06 PROBLEM — K57.92 DIVERTICULITIS OF INTESTINE, PART UNSPECIFIED, WITHOUT PERFORATION OR ABSCESS WITHOUT BLEEDING: Chronic | Status: ACTIVE | Noted: 2022-07-04
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
## 2022-07-12 DIAGNOSIS — J67.9 HYPERSENSITIVITY PNEUMONITIS DUE TO UNSPECIFIED ORGANIC DUST: ICD-10-CM

## 2022-07-12 DIAGNOSIS — Z79.52 LONG TERM (CURRENT) USE OF SYSTEMIC STEROIDS: ICD-10-CM

## 2022-07-12 DIAGNOSIS — Z91.041 RADIOGRAPHIC DYE ALLERGY STATUS: ICD-10-CM

## 2022-07-12 DIAGNOSIS — J96.01 ACUTE RESPIRATORY FAILURE WITH HYPOXIA: ICD-10-CM

## 2022-07-12 DIAGNOSIS — K57.92 DIVERTICULITIS OF INTESTINE, PART UNSPECIFIED, WITHOUT PERFORATION OR ABSCESS WITHOUT BLEEDING: ICD-10-CM

## 2022-07-12 DIAGNOSIS — J45.901 UNSPECIFIED ASTHMA WITH (ACUTE) EXACERBATION: ICD-10-CM

## 2022-07-12 DIAGNOSIS — N63.20 UNSPECIFIED LUMP IN THE LEFT BREAST, UNSPECIFIED QUADRANT: ICD-10-CM

## 2022-07-12 DIAGNOSIS — R07.9 CHEST PAIN, UNSPECIFIED: ICD-10-CM

## 2022-07-12 DIAGNOSIS — Z90.49 ACQUIRED ABSENCE OF OTHER SPECIFIED PARTS OF DIGESTIVE TRACT: ICD-10-CM

## 2022-07-12 DIAGNOSIS — J06.9 ACUTE UPPER RESPIRATORY INFECTION, UNSPECIFIED: ICD-10-CM

## 2022-07-12 DIAGNOSIS — Z87.891 PERSONAL HISTORY OF NICOTINE DEPENDENCE: ICD-10-CM

## 2022-07-12 DIAGNOSIS — R09.02 HYPOXEMIA: ICD-10-CM

## 2022-07-15 ENCOUNTER — EMERGENCY (EMERGENCY)
Facility: HOSPITAL | Age: 62
LOS: 1 days | Discharge: HOME | End: 2022-07-15
Admitting: EMERGENCY MEDICINE
Payer: SELF-PAY

## 2022-07-15 VITALS
SYSTOLIC BLOOD PRESSURE: 127 MMHG | TEMPERATURE: 98 F | DIASTOLIC BLOOD PRESSURE: 86 MMHG | OXYGEN SATURATION: 94 % | HEART RATE: 99 BPM | WEIGHT: 179.9 LBS | RESPIRATION RATE: 20 BRPM | HEIGHT: 64 IN

## 2022-07-15 DIAGNOSIS — E66.3 OVERWEIGHT: ICD-10-CM

## 2022-07-15 DIAGNOSIS — Z98.89 OTHER SPECIFIED POSTPROCEDURAL STATES: Chronic | ICD-10-CM
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
## 2022-07-15 DIAGNOSIS — R51.9 HEADACHE, UNSPECIFIED: ICD-10-CM

## 2022-07-15 DIAGNOSIS — S09.90XA UNSPECIFIED INJURY OF HEAD, INITIAL ENCOUNTER: ICD-10-CM

## 2022-07-15 DIAGNOSIS — Z91.041 RADIOGRAPHIC DYE ALLERGY STATUS: ICD-10-CM

## 2022-07-15 DIAGNOSIS — R07.89 OTHER CHEST PAIN: ICD-10-CM

## 2022-07-15 DIAGNOSIS — Z90.49 ACQUIRED ABSENCE OF OTHER SPECIFIED PARTS OF DIGESTIVE TRACT: Chronic | ICD-10-CM

## 2022-07-15 DIAGNOSIS — M54.2 CERVICALGIA: ICD-10-CM
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
## 2022-07-15 PROCEDURE — 72125 CT NECK SPINE W/O DYE: CPT | Mod: MG

## 2022-07-15 PROCEDURE — 99284 EMERGENCY DEPT VISIT MOD MDM: CPT | Mod: 25

## 2022-07-15 PROCEDURE — 70450 CT HEAD/BRAIN W/O DYE: CPT | Mod: 26,MG

## 2022-07-15 PROCEDURE — 72125 CT NECK SPINE W/O DYE: CPT | Mod: 26,MG

## 2022-07-15 PROCEDURE — G1004: CPT

## 2022-07-15 PROCEDURE — 71046 X-RAY EXAM CHEST 2 VIEWS: CPT | Mod: 26

## 2022-07-15 PROCEDURE — 71046 X-RAY EXAM CHEST 2 VIEWS: CPT

## 2022-07-15 PROCEDURE — 70450 CT HEAD/BRAIN W/O DYE: CPT | Mod: MG

## 2022-07-15 PROCEDURE — 99285 EMERGENCY DEPT VISIT HI MDM: CPT

## 2022-07-15 RX ORDER — KETOROLAC TROMETHAMINE 30 MG/ML
30 SYRINGE (ML) INJECTION ONCE
Refills: 0 | Status: DISCONTINUED | OUTPATIENT
Start: 2022-07-15 | End: 2022-07-15

## 2022-07-15 RX ORDER — CYCLOBENZAPRINE HYDROCHLORIDE 10 MG/1
1 TABLET, FILM COATED ORAL
Qty: 15 | Refills: 0
Start: 2022-07-15 | End: 2022-07-19

## 2022-07-15 RX ORDER — IBUPROFEN 200 MG
1 TABLET ORAL
Qty: 20 | Refills: 0
Start: 2022-07-15 | End: 2022-07-19

## 2022-07-15 RX ORDER — ALBUTEROL 90 UG/1
2 AEROSOL, METERED ORAL
Qty: 0 | Refills: 0 | DISCHARGE

## 2022-07-15 RX ORDER — FLUTICASONE FUROATE AND VILANTEROL TRIFENATATE 100; 25 UG/1; UG/1
1 POWDER RESPIRATORY (INHALATION)
Qty: 0 | Refills: 0 | DISCHARGE

## 2022-07-15 RX ORDER — CYCLOBENZAPRINE HYDROCHLORIDE 10 MG/1
10 TABLET, FILM COATED ORAL ONCE
Refills: 0 | Status: COMPLETED | OUTPATIENT
Start: 2022-07-15 | End: 2022-07-15

## 2022-07-15 RX ADMIN — CYCLOBENZAPRINE HYDROCHLORIDE 10 MILLIGRAM(S): 10 TABLET, FILM COATED ORAL at 18:04
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
## 2022-07-15 NOTE — ED PROVIDER NOTE - ENMT, MLM
Airway patent, Nasal mucosa clear. Mouth with normal mucosa. Throat has no vesicles, no oropharyngeal exudates and uvula is midline. Ears: no hemotympanum b/l. no hutchinson signs b/l, no raccoon eyes b/l, no step offs
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
## 2022-07-15 NOTE — ED ADULT NURSE NOTE - NSSEPSISSUSPECTED_ED_A_ED
JERMAINE (obstructive sleep apnea)   • JERMAINE to embolism of cerebral artery (CMS/HCC)   • Pulmonary embolism, other   • Near syncope   • Numbness and tingling of both feet   • Diarrhea       Clinical presentation: stable and/or uncomplicated characteristics     ASSESSMENT:   Pt is an 81 year old male admitted with L foot pain and swelling. PMH is significant for JERMAINE, congenital heart disease, pulmonary HTN, HTN, CAD s/p stenting, A fib. Pt lives with spouse in one level home with one step to enter. Pt reports being independent with all functional mobility skills at baseline. During today's PT evaluation, pt is performing functional mobility skills below baseline level due to heel weight bearing precautions for LLE. Significant amount of time spent educating patient on new weight bearing precautions per podiatrist Shreyat. Pt's current mobility status is supervision for transfers, and ambulating with 2ww. Ambulation trial attempted without 2ww, however pt unable to maintain heel weight bearing precautions without UE support. Pt would benefit from further skilled PT intervention to continue progressing functional mobility independence with new weight bearing restrictions. During session Dr. Gunn reported plan to order heel wedge shoe for patient to use in order to assist with maintaining new weight bearing precautions. Will assess patient's mobility with offloading device next session if shoe is available.     Other Therapeutic Intervention: Time taken for podiatrist Shreyat entering room to discuss POC. Dr. Gunn is to order heel wedge shoe to assist patient with maintaining heel weight bearing status of LLE. Time also taken for patient toileting this session. (10/12/17 6290)     EDUCATION:   On this date, the patient was educated on weight bearing precautions, safety awareness, PT POC.    The response to education was: Verbalizes understanding and Demonstrates understanding    PT Identified Barriers to Discharge: heel weight bearing precautions of LLE      No
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
## 2022-07-15 NOTE — ED PROVIDER NOTE - PATIENT PORTAL LINK FT
You can access the FollowMyHealth Patient Portal offered by [facility] by registering at the following website: [email or web address]. By joining [facility]’s FollowMyHealth portal, you will also be able to view your health information using other applications (apps) compatible with our system.
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
## 2022-07-22 ENCOUNTER — APPOINTMENT (OUTPATIENT)
Age: 62
End: 2022-07-22

## 2022-07-22 ENCOUNTER — APPOINTMENT (OUTPATIENT)
Dept: PULMONOLOGY | Facility: CLINIC | Age: 62
End: 2022-07-22

## 2022-07-22 VITALS
HEIGHT: 65 IN | HEART RATE: 81 BPM | OXYGEN SATURATION: 98 % | DIASTOLIC BLOOD PRESSURE: 88 MMHG | SYSTOLIC BLOOD PRESSURE: 104 MMHG | BODY MASS INDEX: 30.66 KG/M2 | TEMPERATURE: 97.6 F | WEIGHT: 184 LBS

## 2022-07-22 VITALS
HEIGHT: 65 IN | SYSTOLIC BLOOD PRESSURE: 116 MMHG | TEMPERATURE: 98 F | DIASTOLIC BLOOD PRESSURE: 78 MMHG | BODY MASS INDEX: 30.66 KG/M2 | OXYGEN SATURATION: 95 % | WEIGHT: 184 LBS | HEART RATE: 86 BPM

## 2022-07-22 DIAGNOSIS — H53.8 OTHER VISUAL DISTURBANCES: ICD-10-CM

## 2022-07-22 DIAGNOSIS — R51.9 HEADACHE, UNSPECIFIED: ICD-10-CM

## 2022-07-22 DIAGNOSIS — R79.89 OTHER SPECIFIED ABNORMAL FINDINGS OF BLOOD CHEMISTRY: ICD-10-CM

## 2022-07-22 DIAGNOSIS — M54.9 DORSALGIA, UNSPECIFIED: ICD-10-CM

## 2022-07-22 DIAGNOSIS — Z12.4 ENCOUNTER FOR SCREENING FOR MALIGNANT NEOPLASM OF CERVIX: ICD-10-CM

## 2022-07-22 DIAGNOSIS — Z12.11 ENCOUNTER FOR SCREENING FOR MALIGNANT NEOPLASM OF COLON: ICD-10-CM

## 2022-07-22 PROCEDURE — 99204 OFFICE O/P NEW MOD 45 MIN: CPT

## 2022-07-22 PROCEDURE — 99214 OFFICE O/P EST MOD 30 MIN: CPT

## 2022-07-22 RX ORDER — ALBUTEROL SULFATE 90 UG/1
108 (90 BASE) INHALANT RESPIRATORY (INHALATION)
Qty: 7 | Refills: 0 | Status: ACTIVE | COMMUNITY
Start: 2022-02-27

## 2022-07-22 RX ORDER — PREDNISONE 20 MG/1
20 TABLET ORAL
Qty: 28 | Refills: 0 | Status: DISCONTINUED | COMMUNITY
Start: 2020-09-30 | End: 2022-07-22

## 2022-07-22 RX ORDER — ALBUTEROL SULFATE 90 UG/1
108 (90 BASE) POWDER, METERED RESPIRATORY (INHALATION) EVERY 6 HOURS
Qty: 1 | Refills: 3 | Status: DISCONTINUED | COMMUNITY
Start: 2020-08-20 | End: 2022-07-22

## 2022-07-22 RX ORDER — SULFAMETHOXAZOLE AND TRIMETHOPRIM 800; 160 MG/1; MG/1
800-160 TABLET ORAL DAILY
Qty: 28 | Refills: 0 | Status: DISCONTINUED | COMMUNITY
Start: 2020-09-30 | End: 2022-07-22

## 2022-07-22 NOTE — ASSESSMENT
[FreeTextEntry1] : Data reviewed: \par \par CT Chest [facility] 7/2020 : diffuse mosaic attenuation\par CXR [facility] 7/2022 personally reviewed : clear lungs\par \par [place] 8/19/20: severe restriction\par PFT 8/19/20 [facility]: complex restrictive abnormality, severe, FEV1 48%, TLC 69%, DLCO 48%, no sig BD response\par \par BAL 9/2020: lymphs 41% and 23% in separate samples; TBBx nondiagnostic\par \par Impression:\par Asthma exacerbation - clinical dx\par HP, diagnosis on basis of imaging and BAL lymphocytosis - resolved\par \par Plan:\par Start Symbicort bid and return in 1-2 mos w PFT/FENO. Told her about the need for Covid PCR.\par No evidence of HP as active diagnosis at this time.\par Unvaxxed for Covid.
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
## 2022-08-03 ENCOUNTER — APPOINTMENT (OUTPATIENT)
Dept: PULMONOLOGY | Facility: CLINIC | Age: 62
End: 2022-08-03

## 2022-08-18 ENCOUNTER — RESULT REVIEW (OUTPATIENT)
Age: 62
End: 2022-08-18

## 2022-08-18 ENCOUNTER — APPOINTMENT (OUTPATIENT)
Dept: MAMMOGRAPHY | Facility: HOSPITAL | Age: 62
End: 2022-08-18

## 2022-08-18 ENCOUNTER — APPOINTMENT (OUTPATIENT)
Dept: ULTRASOUND IMAGING | Facility: HOSPITAL | Age: 62
End: 2022-08-18

## 2022-08-18 ENCOUNTER — LABORATORY RESULT (OUTPATIENT)
Age: 62
End: 2022-08-18

## 2022-08-18 ENCOUNTER — OUTPATIENT (OUTPATIENT)
Dept: OUTPATIENT SERVICES | Facility: HOSPITAL | Age: 62
LOS: 1 days | End: 2022-08-18
Payer: COMMERCIAL

## 2022-08-18 DIAGNOSIS — Z90.49 ACQUIRED ABSENCE OF OTHER SPECIFIED PARTS OF DIGESTIVE TRACT: Chronic | ICD-10-CM

## 2022-08-18 DIAGNOSIS — Z98.89 OTHER SPECIFIED POSTPROCEDURAL STATES: Chronic | ICD-10-CM

## 2022-08-18 PROCEDURE — 77067 SCR MAMMO BI INCL CAD: CPT

## 2022-08-18 PROCEDURE — 77067 SCR MAMMO BI INCL CAD: CPT | Mod: 26

## 2022-08-18 PROCEDURE — 76641 ULTRASOUND BREAST COMPLETE: CPT

## 2022-08-18 PROCEDURE — 76641 ULTRASOUND BREAST COMPLETE: CPT | Mod: 26,50

## 2022-08-18 PROCEDURE — 77063 BREAST TOMOSYNTHESIS BI: CPT | Mod: 26

## 2022-08-18 PROCEDURE — 77063 BREAST TOMOSYNTHESIS BI: CPT

## 2022-08-22 ENCOUNTER — APPOINTMENT (OUTPATIENT)
Dept: PULMONOLOGY | Facility: CLINIC | Age: 62
End: 2022-08-22

## 2022-08-22 VITALS
DIASTOLIC BLOOD PRESSURE: 80 MMHG | OXYGEN SATURATION: 98 % | SYSTOLIC BLOOD PRESSURE: 130 MMHG | BODY MASS INDEX: 29.57 KG/M2 | TEMPERATURE: 97.5 F | WEIGHT: 184 LBS | HEIGHT: 66 IN | HEART RATE: 81 BPM

## 2022-08-22 PROCEDURE — 99213 OFFICE O/P EST LOW 20 MIN: CPT | Mod: 25

## 2022-08-22 PROCEDURE — 94060 EVALUATION OF WHEEZING: CPT

## 2022-08-22 PROCEDURE — 94729 DIFFUSING CAPACITY: CPT

## 2022-08-22 PROCEDURE — 95012 NITRIC OXIDE EXP GAS DETER: CPT

## 2022-08-22 PROCEDURE — 94727 GAS DIL/WSHOT DETER LNG VOL: CPT

## 2022-08-22 NOTE — ASSESSMENT
[FreeTextEntry1] : Data reviewed: \par \par CT Chest [facility] 7/2020 : diffuse mosaic attenuation\par CXR [facility] 7/2022 personally reviewed : clear lungs\par \par [place] 8/19/20: severe restriction\par PFT 8/19/20 [facility]: complex restrictive abnormality, severe, FEV1 48%, TLC 69%, DLCO 48%, no sig BD response\par PFT office 8/22/22: normal narayan, FEV1 82%, TLC 73%, DLCO 66% / FENO 14\par \par BAL 9/2020: lymphs 41% and 23% in separate samples; TBBx nondiagnostic\par \par Impression:\par Asthma - clinical dx\par HP, diagnosis on basis of imaging and BAL lymphocytosis - 2020\par \par Plan:\par The PFT shows no airflow limitation despite her present dyspnea.\par Will get CT chest to evaluate for persistent findings of HP.\par Cont Symbicort for now.
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
## 2022-08-31 ENCOUNTER — APPOINTMENT (OUTPATIENT)
Dept: CT IMAGING | Facility: HOSPITAL | Age: 62
End: 2022-08-31

## 2022-10-11 ENCOUNTER — APPOINTMENT (OUTPATIENT)
Dept: CT IMAGING | Facility: HOSPITAL | Age: 62
End: 2022-10-11

## 2022-10-19 ENCOUNTER — APPOINTMENT (OUTPATIENT)
Dept: CT IMAGING | Facility: CLINIC | Age: 62
End: 2022-10-19

## 2022-10-19 PROCEDURE — 71250 CT THORAX DX C-: CPT

## 2022-11-07 ENCOUNTER — APPOINTMENT (OUTPATIENT)
Dept: PULMONOLOGY | Facility: CLINIC | Age: 62
End: 2022-11-07

## 2022-11-07 ENCOUNTER — RX RENEWAL (OUTPATIENT)
Age: 62
End: 2022-11-07

## 2022-11-07 VITALS
SYSTOLIC BLOOD PRESSURE: 116 MMHG | BODY MASS INDEX: 29.57 KG/M2 | TEMPERATURE: 98 F | DIASTOLIC BLOOD PRESSURE: 70 MMHG | OXYGEN SATURATION: 98 % | HEART RATE: 72 BPM | WEIGHT: 184 LBS | HEIGHT: 66 IN

## 2022-11-07 PROCEDURE — 99214 OFFICE O/P EST MOD 30 MIN: CPT

## 2022-11-07 NOTE — ASSESSMENT
[FreeTextEntry1] : Data reviewed: \par \par CT chest [facility] 9/2020 : diffuse mosaic attenuation\par CT chest [facility] 10/2022 personally reviewed : marked decrease in ground glass compared to prior scan\par \par PFT 8/19/20 [facility]: FVC 1.41L (43%), FEV1 1.24L (48%), TLC 3.37L (69%), DLCO 10.4 (48%)\par PFT office 8/22/22: FVC 2.62L (83%), FEV1 2.02L (82%), TLC 3.54L (73%), DLCO 14.13 (66%) / FENO 14\par \par BAL 9/2020: lymphs 41% and 23% in separate samples; TBBx nondiagnostic\par \par Impression:\par HP, diagnosis on basis of imaging and BAL lymphocytosis - 2020\par Asthma - clinical dx, not clearly correct\par \par Plan:\par She gives the hx of asthma, and she had an episode in the hospital that clinically appeared consistent with asthma, but her PFT does not show airflow obstruction, and symptoms did not improve on Symbicort.\par She does have good evidence of HP by exposure history, scan and BAL lymphocytosis. Her scan and PFT have markedly improved from 2020 yet she remains profoundly symptomatic. She now is complaining of chest pain, albeit nonexertional. I could put her back on steroids, but she has a history of abruptly stopping steroids in 2020 and to me her symptoms are somewhat out of proportion to the objective pulmonary data. The patient and I agree that we will seek cardiology consultation first to evaluate for any cardiac contribution to dyspnea. If that is unremarkable, we can return to the possibility of retreated for HP w steroids. She is out of the home from 2020.\par She declines vaccines.
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
## 2022-11-11 ENCOUNTER — NON-APPOINTMENT (OUTPATIENT)
Age: 62
End: 2022-11-11

## 2022-11-11 ENCOUNTER — APPOINTMENT (OUTPATIENT)
Dept: HEART AND VASCULAR | Facility: CLINIC | Age: 62
End: 2022-11-11

## 2022-11-11 VITALS
OXYGEN SATURATION: 95 % | WEIGHT: 188 LBS | SYSTOLIC BLOOD PRESSURE: 122 MMHG | DIASTOLIC BLOOD PRESSURE: 82 MMHG | HEIGHT: 66 IN | HEART RATE: 80 BPM | BODY MASS INDEX: 30.22 KG/M2 | TEMPERATURE: 97.7 F

## 2022-11-11 DIAGNOSIS — Z83.3 FAMILY HISTORY OF DIABETES MELLITUS: ICD-10-CM

## 2022-11-11 DIAGNOSIS — Z82.49 FAMILY HISTORY OF ISCHEMIC HEART DISEASE AND OTHER DISEASES OF THE CIRCULATORY SYSTEM: ICD-10-CM

## 2022-11-11 DIAGNOSIS — Z83.438 FAMILY HISTORY OF OTHER DISORDER OF LIPOPROTEIN METABOLISM AND OTHER LIPIDEMIA: ICD-10-CM

## 2022-11-11 DIAGNOSIS — M71.20 SYNOVIAL CYST OF POPLITEAL SPACE [BAKER], UNSPECIFIED KNEE: ICD-10-CM

## 2022-11-11 PROCEDURE — 99203 OFFICE O/P NEW LOW 30 MIN: CPT | Mod: 25

## 2022-11-11 PROCEDURE — 93000 ELECTROCARDIOGRAM COMPLETE: CPT

## 2022-11-11 PROCEDURE — 36415 COLL VENOUS BLD VENIPUNCTURE: CPT

## 2022-11-13 LAB
ALBUMIN SERPL ELPH-MCNC: 4.8 G/DL
ALP BLD-CCNC: 109 U/L
ALT SERPL-CCNC: 32 U/L
ANION GAP SERPL CALC-SCNC: 15 MMOL/L
AST SERPL-CCNC: 23 U/L
BASOPHILS # BLD AUTO: 0.06 K/UL
BASOPHILS NFR BLD AUTO: 0.8 %
BILIRUB SERPL-MCNC: 0.5 MG/DL
BUN SERPL-MCNC: 14 MG/DL
CALCIUM SERPL-MCNC: 9.8 MG/DL
CHLORIDE SERPL-SCNC: 100 MMOL/L
CHOLEST SERPL-MCNC: 250 MG/DL
CO2 SERPL-SCNC: 22 MMOL/L
CREAT SERPL-MCNC: 0.6 MG/DL
CREAT SPEC-SCNC: 119 MG/DL
EGFR: 102 ML/MIN/1.73M2
EOSINOPHIL # BLD AUTO: 0.16 K/UL
EOSINOPHIL NFR BLD AUTO: 2.1 %
ESTIMATED AVERAGE GLUCOSE: 137 MG/DL
GLUCOSE SERPL-MCNC: 87 MG/DL
HBA1C MFR BLD HPLC: 6.4 %
HCT VFR BLD CALC: 43.4 %
HDLC SERPL-MCNC: 45 MG/DL
HGB BLD-MCNC: 14.4 G/DL
IMM GRANULOCYTES NFR BLD AUTO: 0.3 %
LDLC SERPL CALC-MCNC: 143 MG/DL
LYMPHOCYTES # BLD AUTO: 2.66 K/UL
LYMPHOCYTES NFR BLD AUTO: 34.7 %
MAN DIFF?: NORMAL
MCHC RBC-ENTMCNC: 30.4 PG
MCHC RBC-ENTMCNC: 33.2 GM/DL
MCV RBC AUTO: 91.6 FL
MICROALBUMIN 24H UR DL<=1MG/L-MCNC: <1.2 MG/DL
MICROALBUMIN/CREAT 24H UR-RTO: NORMAL MG/G
MONOCYTES # BLD AUTO: 0.56 K/UL
MONOCYTES NFR BLD AUTO: 7.3 %
NEUTROPHILS # BLD AUTO: 4.21 K/UL
NEUTROPHILS NFR BLD AUTO: 54.8 %
NONHDLC SERPL-MCNC: 205 MG/DL
NT-PROBNP SERPL-MCNC: <5 PG/ML
PLATELET # BLD AUTO: 295 K/UL
POTASSIUM SERPL-SCNC: 4.2 MMOL/L
PROT SERPL-MCNC: 8.3 G/DL
RBC # BLD: 4.74 M/UL
RBC # FLD: 12.5 %
SODIUM SERPL-SCNC: 138 MMOL/L
TRIGL SERPL-MCNC: 311 MG/DL
WBC # FLD AUTO: 7.67 K/UL

## 2022-11-14 NOTE — ASSESSMENT
[FreeTextEntry1] : - chest pain will do CCTA and echo\par - HM check lipids and HgA1c\par - if work up is unrevealing will do a cpet\par - fu after testing

## 2022-11-14 NOTE — HISTORY OF PRESENT ILLNESS
[FreeTextEntry1] : 62 F Asthma Mold Ground glass Lung opacities\par \par referred by Dr. Brown for cp and shortness of breath chest pain described as weight on her chest with chest heaviness worth with walking sharp pain she has arm left pain with it as well lasts for a few minutes better with rest \par \par \par ecg nsr nsst changes 11/11/2022

## 2022-11-24 ENCOUNTER — EMERGENCY (EMERGENCY)
Facility: HOSPITAL | Age: 62
LOS: 1 days | Discharge: HOME | End: 2022-11-24
Attending: EMERGENCY MEDICINE | Admitting: EMERGENCY MEDICINE
Payer: MEDICAID

## 2022-11-24 VITALS
TEMPERATURE: 99 F | RESPIRATION RATE: 18 BRPM | WEIGHT: 179.9 LBS | SYSTOLIC BLOOD PRESSURE: 165 MMHG | HEART RATE: 120 BPM | OXYGEN SATURATION: 96 % | DIASTOLIC BLOOD PRESSURE: 93 MMHG

## 2022-11-24 VITALS
OXYGEN SATURATION: 94 % | HEART RATE: 110 BPM | TEMPERATURE: 99 F | DIASTOLIC BLOOD PRESSURE: 76 MMHG | SYSTOLIC BLOOD PRESSURE: 123 MMHG | RESPIRATION RATE: 16 BRPM

## 2022-11-24 DIAGNOSIS — R00.0 TACHYCARDIA, UNSPECIFIED: ICD-10-CM

## 2022-11-24 DIAGNOSIS — Z90.49 ACQUIRED ABSENCE OF OTHER SPECIFIED PARTS OF DIGESTIVE TRACT: Chronic | ICD-10-CM

## 2022-11-24 DIAGNOSIS — Z91.041 RADIOGRAPHIC DYE ALLERGY STATUS: ICD-10-CM

## 2022-11-24 DIAGNOSIS — J45.909 UNSPECIFIED ASTHMA, UNCOMPLICATED: ICD-10-CM

## 2022-11-24 DIAGNOSIS — R09.81 NASAL CONGESTION: ICD-10-CM

## 2022-11-24 DIAGNOSIS — U07.1 COVID-19: ICD-10-CM

## 2022-11-24 DIAGNOSIS — Z98.89 OTHER SPECIFIED POSTPROCEDURAL STATES: Chronic | ICD-10-CM

## 2022-11-24 LAB
ALBUMIN SERPL ELPH-MCNC: 4.6 G/DL — SIGNIFICANT CHANGE UP (ref 3.3–5)
ALP SERPL-CCNC: 111 U/L — SIGNIFICANT CHANGE UP (ref 40–120)
ALT FLD-CCNC: 42 U/L — SIGNIFICANT CHANGE UP (ref 10–45)
ANION GAP SERPL CALC-SCNC: 10 MMOL/L — SIGNIFICANT CHANGE UP (ref 5–17)
AST SERPL-CCNC: 45 U/L — HIGH (ref 10–40)
BASOPHILS # BLD AUTO: 0.06 K/UL — SIGNIFICANT CHANGE UP (ref 0–0.2)
BASOPHILS NFR BLD AUTO: 0.7 % — SIGNIFICANT CHANGE UP (ref 0–2)
BILIRUB SERPL-MCNC: 0.9 MG/DL — SIGNIFICANT CHANGE UP (ref 0.2–1.2)
BUN SERPL-MCNC: 9 MG/DL — SIGNIFICANT CHANGE UP (ref 7–23)
CALCIUM SERPL-MCNC: 9.2 MG/DL — SIGNIFICANT CHANGE UP (ref 8.4–10.5)
CHLORIDE SERPL-SCNC: 98 MMOL/L — SIGNIFICANT CHANGE UP (ref 96–108)
CO2 SERPL-SCNC: 26 MMOL/L — SIGNIFICANT CHANGE UP (ref 22–31)
CREAT SERPL-MCNC: 0.62 MG/DL — SIGNIFICANT CHANGE UP (ref 0.5–1.3)
CRP SERPL-MCNC: 28.9 MG/L — HIGH (ref 0–4)
EGFR: 101 ML/MIN/1.73M2 — SIGNIFICANT CHANGE UP
EOSINOPHIL # BLD AUTO: 0.01 K/UL — SIGNIFICANT CHANGE UP (ref 0–0.5)
EOSINOPHIL NFR BLD AUTO: 0.1 % — SIGNIFICANT CHANGE UP (ref 0–6)
FLUAV AG NPH QL: SIGNIFICANT CHANGE UP
FLUBV AG NPH QL: SIGNIFICANT CHANGE UP
GLUCOSE SERPL-MCNC: 129 MG/DL — HIGH (ref 70–99)
HCT VFR BLD CALC: 40.4 % — SIGNIFICANT CHANGE UP (ref 34.5–45)
HGB BLD-MCNC: 13.9 G/DL — SIGNIFICANT CHANGE UP (ref 11.5–15.5)
IMM GRANULOCYTES NFR BLD AUTO: 0.2 % — SIGNIFICANT CHANGE UP (ref 0–0.9)
LACTATE SERPL-SCNC: 1.7 MMOL/L — SIGNIFICANT CHANGE UP (ref 0.5–2)
LYMPHOCYTES # BLD AUTO: 0.86 K/UL — LOW (ref 1–3.3)
LYMPHOCYTES # BLD AUTO: 9.5 % — LOW (ref 13–44)
MCHC RBC-ENTMCNC: 30 PG — SIGNIFICANT CHANGE UP (ref 27–34)
MCHC RBC-ENTMCNC: 34.4 GM/DL — SIGNIFICANT CHANGE UP (ref 32–36)
MCV RBC AUTO: 87.3 FL — SIGNIFICANT CHANGE UP (ref 80–100)
MONOCYTES # BLD AUTO: 0.65 K/UL — SIGNIFICANT CHANGE UP (ref 0–0.9)
MONOCYTES NFR BLD AUTO: 7.2 % — SIGNIFICANT CHANGE UP (ref 2–14)
NEUTROPHILS # BLD AUTO: 7.42 K/UL — HIGH (ref 1.8–7.4)
NEUTROPHILS NFR BLD AUTO: 82.3 % — HIGH (ref 43–77)
NRBC # BLD: 0 /100 WBCS — SIGNIFICANT CHANGE UP (ref 0–0)
PLATELET # BLD AUTO: 254 K/UL — SIGNIFICANT CHANGE UP (ref 150–400)
POTASSIUM SERPL-MCNC: 4.2 MMOL/L — SIGNIFICANT CHANGE UP (ref 3.5–5.3)
POTASSIUM SERPL-SCNC: 4.2 MMOL/L — SIGNIFICANT CHANGE UP (ref 3.5–5.3)
PROT SERPL-MCNC: 8.3 G/DL — SIGNIFICANT CHANGE UP (ref 6–8.3)
RBC # BLD: 4.63 M/UL — SIGNIFICANT CHANGE UP (ref 3.8–5.2)
RBC # FLD: 11.9 % — SIGNIFICANT CHANGE UP (ref 10.3–14.5)
RSV RNA NPH QL NAA+NON-PROBE: SIGNIFICANT CHANGE UP
SARS-COV-2 RNA SPEC QL NAA+PROBE: DETECTED
SODIUM SERPL-SCNC: 134 MMOL/L — LOW (ref 135–145)
TROPONIN T SERPL-MCNC: 0.01 NG/ML — SIGNIFICANT CHANGE UP (ref 0–0.01)
WBC # BLD: 9.02 K/UL — SIGNIFICANT CHANGE UP (ref 3.8–10.5)
WBC # FLD AUTO: 9.02 K/UL — SIGNIFICANT CHANGE UP (ref 3.8–10.5)

## 2022-11-24 PROCEDURE — 85025 COMPLETE CBC W/AUTO DIFF WBC: CPT

## 2022-11-24 PROCEDURE — 99285 EMERGENCY DEPT VISIT HI MDM: CPT | Mod: 25

## 2022-11-24 PROCEDURE — 99284 EMERGENCY DEPT VISIT MOD MDM: CPT

## 2022-11-24 PROCEDURE — 80053 COMPREHEN METABOLIC PANEL: CPT

## 2022-11-24 PROCEDURE — 36415 COLL VENOUS BLD VENIPUNCTURE: CPT

## 2022-11-24 PROCEDURE — 94640 AIRWAY INHALATION TREATMENT: CPT

## 2022-11-24 PROCEDURE — 87040 BLOOD CULTURE FOR BACTERIA: CPT

## 2022-11-24 PROCEDURE — 86140 C-REACTIVE PROTEIN: CPT

## 2022-11-24 PROCEDURE — 84484 ASSAY OF TROPONIN QUANT: CPT

## 2022-11-24 PROCEDURE — 87637 SARSCOV2&INF A&B&RSV AMP PRB: CPT

## 2022-11-24 PROCEDURE — 83605 ASSAY OF LACTIC ACID: CPT

## 2022-11-24 RX ORDER — ACETAMINOPHEN 500 MG
1000 TABLET ORAL ONCE
Refills: 0 | Status: COMPLETED | OUTPATIENT
Start: 2022-11-24 | End: 2022-11-24

## 2022-11-24 RX ORDER — IPRATROPIUM/ALBUTEROL SULFATE 18-103MCG
3 AEROSOL WITH ADAPTER (GRAM) INHALATION
Refills: 0 | Status: COMPLETED | OUTPATIENT
Start: 2022-11-24 | End: 2022-11-24

## 2022-11-24 RX ORDER — SODIUM CHLORIDE 9 MG/ML
1000 INJECTION INTRAMUSCULAR; INTRAVENOUS; SUBCUTANEOUS ONCE
Refills: 0 | Status: COMPLETED | OUTPATIENT
Start: 2022-11-24 | End: 2022-11-24

## 2022-11-24 RX ORDER — ALBUTEROL 90 UG/1
2 AEROSOL, METERED ORAL
Qty: 1 | Refills: 0
Start: 2022-11-24 | End: 2022-11-30

## 2022-11-24 RX ADMIN — SODIUM CHLORIDE 1000 MILLILITER(S): 9 INJECTION INTRAMUSCULAR; INTRAVENOUS; SUBCUTANEOUS at 15:05

## 2022-11-24 RX ADMIN — Medication 3 MILLILITER(S): at 16:30

## 2022-11-24 RX ADMIN — Medication 1000 MILLIGRAM(S): at 15:06

## 2022-11-24 RX ADMIN — Medication 3 MILLILITER(S): at 15:38

## 2022-11-24 RX ADMIN — Medication 3 MILLILITER(S): at 15:06

## 2022-11-24 NOTE — ED PROVIDER NOTE - PATIENT PORTAL LINK FT
You can access the FollowMyHealth Patient Portal offered by [facility] by registering at the following website: [email or web address]. By joining [facility]’s FollowMyHealth portal, you will also be able to view your health information using other applications (apps) compatible with our system.

## 2022-11-24 NOTE — ED PROVIDER NOTE - NS ED ATTENDING STATEMENT MOD
This was a shared visit with the [facility]. I reviewed and verified the documentation and independently performed the documented:

## 2022-11-24 NOTE — ED ADULT NURSE NOTE - EXTENSIONS OF SELF_ADULT
Vaccine Information Statement(s) was given today. This has been reviewed, questions answered, and verbal consent given by Patient for injection(s) and administration of Pneumococcal Polysaccharide (PPSV).    Patient tolerated without incident. See immunization grid for documentation.     None

## 2022-11-24 NOTE — ED ADULT NURSE NOTE - OBJECTIVE STATEMENT
62y female presents to ED c/o SOB since yesterday. Pt states "I feel like I'm gasping for air." Pt speaking in full and complete sentences. No accessory muscle use noted. Pt also endorses fever, chest pain, cough, nausea, and headache. Only PMH of asthma, used albuterol inhaler at home without relief. A&Ox4.

## 2022-11-24 NOTE — ED PROVIDER NOTE - NSFOLLOWUPINSTRUCTIONS_ED_ALL_ED_FT
COVID-19 (Coronavirus Disease 2019)  WHAT YOU NEED TO KNOW:  COVID-19 is the disease caused by a coronavirus first discovered in December 2019. Coronaviruses generally cause upper respiratory (nose, throat, and lung) infections, such as a cold. The 2019 virus spreads quickly and easily. It can be spread starting 2 to 3 days before symptoms even begin.  DISCHARGE INSTRUCTIONS:  Call your local emergency number (911 in the ) if:   •You have trouble breathing or shortness of breath at rest.  •You have chest pain or pressure that lasts longer than 5 minutes.  •You become confused or hard to wake.  •Your lips or face are blue.  Return to the emergency department if:   •You have a fever of 104°F (40°C) or higher.  Call your doctor if:   •You have symptoms of COVID-19.  •You have questions or concerns about your condition or care.  Medicines: You may need any of the following:  •Decongestants help reduce nasal congestion and help you breathe more easily. If you take decongestant pills, they may make you feel restless or cause problems with your sleep. Do not use decongestant sprays for more than a few days.  •Cough suppressants help reduce coughing. Ask your healthcare provider which type of cough medicine is best for you.  •Acetaminophen decreases pain and fever. It is available without a doctor's order. Ask how much to take and how often to take it. Follow directions. Read the labels of all other medicines you are using to see if they also contain acetaminophen, or ask your doctor or pharmacist. Acetaminophen can cause liver damage if not taken correctly.  •NSAIDs, such as ibuprofen, help decrease swelling, pain, and fever. This medicine is available with or without a doctor's order. NSAIDs can cause stomach bleeding or kidney problems in certain people. If you take blood thinner medicine, always ask your healthcare provider if NSAIDs are safe for you. Always read the medicine label and follow directions.  •Blood thinners help prevent blood clots. Clots can cause strokes, heart attacks, and death. The following are general safety guidelines to follow while you are taking a blood thinner:?Watch for bleeding and bruising while you take blood thinners. Watch for bleeding from your gums or nose. Watch for blood in your urine and bowel movements. Use a soft washcloth on your skin, and a soft toothbrush to brush your teeth. This can keep your skin and gums from bleeding. If you shave, use an electric shaver. Do not play contact sports.   ?Tell your dentist and other healthcare providers that you take a blood thinner. Wear a bracelet or necklace that says you take this medicine.   ?Do not start or stop any other medicines unless your healthcare provider tells you to. Many medicines cannot be used with blood thinners.  ?Take your blood thinner exactly as prescribed by your healthcare provider. Do not skip does or take less than prescribed. Tell your provider right away if you forget to take your blood thinner, or if you take too much.  ?Warfarin is a blood thinner that you may need to take. The following are things you should be aware of if you take warfarin: ?Foods and medicines can affect the amount of warfarin in your blood. Do not make major changes to your diet while you take warfarin. Warfarin works best when you eat about the same amount of vitamin K every day. Vitamin K is found in green leafy vegetables and certain other foods. Ask for more information about what to eat when you are taking warfarin.  ?You will need to see your healthcare provider for follow-up visits when you are on warfarin. You will need regular blood tests. These tests are used to decide how much medicine you need.   •Take your medicine as directed. Contact your healthcare provider if you think your medicine is not helping or if you have side effects. Tell your provider if you are allergic to any medicine. Keep a list of the medicines, vitamins, and herbs you take. Include the amounts, and when and why you take them. Bring the list or the pill bottles to follow-up visits. Carry your medicine list with you in case of an emergency.  What you need to know about variants: The virus has changed into several new forms (called variants) since it was discovered. The variants may be more contagious (easily spread) than the original form. Some may also cause more severe illness than others.  What you need to know about COVID-19 vaccines: Healthcare providers recommend a COVID-19 vaccine, even if you have already had COVID-19. You are considered fully vaccinated against COVID-19 two weeks after the final dose of any COVID-19 vaccine. Let your healthcare provider know when you have received the final dose of the vaccine. Make a copy of your vaccination card. Keep the original with you in case you need to show it. Keep the copy in a safe place.  •Get a COVID-19 vaccine as directed. Vaccination is recommended for everyone 6 months or older. COVID-19 vaccines are given as a shot in 1 to 3 doses as a primary series. This depends on the vaccine brand and the age of the person who receives it. A booster dose is recommended for everyone 5 years or older after the primary series is complete. A second booster is recommended for all adults 50 or older and for immunocompromised adolescents. The second booster is also recommended for anyone who got the 1-dose brand of vaccine for the first dose and a booster. Your provider can give you more information on boosters and help you schedule all needed doses.  Recommended COVID-19 Immunization Schedule  •Continue social distancing and other measures. You can become infected even after you get the vaccine. You may also be able to pass the virus to others without knowing you are infected.  •After you get the vaccine, check local, national, and international travel rules. You may need to be tested before you travel. Some countries require proof of a negative test before you travel. You may also need to quarantine after you return.  •Medicine may be given to prevent infection. The medicine can be given if you are at high risk for infection and cannot get the vaccine. It can also be given if your immune system does not respond well to the vaccine.  How the 2019 coronavirus spreads:   •Droplets are the main way all coronaviruses spread. The virus travels in droplets that form when a person talks, sings, coughs, or sneezes. The droplets can also float in the air for minutes or hours. Infection happens when you breathe in the droplets or get them in your eyes or nose. Close personal contact with an infected person increases your risk for infection. This means being within 6 feet (2 meters) of the person for at least 15 minutes over 24 hours.  •Person-to-person contact can spread the virus. For example, a person with the virus on his or her hands can spread it by shaking hands with someone.  •The virus can stay on objects and surfaces for up to 3 days. You may become infected by touching the object or surface and then touching your eyes or mouth.  Help lower the risk for COVID-19:   •Wash your hands often throughout the day. Use soap and water. Rub your soapy hands together, lacing your fingers, for at least 20 seconds. Rinse with warm, running water. Dry your hands with a clean towel or paper towel. Use hand  that contains alcohol if soap and water are not available. Teach children how to wash their hands and use hand .  Handwashing  •Cover sneezes and coughs. Turn your face away and cover your mouth and nose with a tissue. Throw the tissue away. Use the bend of your arm if a tissue is not available. Then wash your hands well with soap and water or use hand . Teach children how to cover a cough or sneeze.  •Wear a face covering (mask) when needed. Use a cloth covering with at least 2 layers. You can also create layers by putting a cloth covering over a disposable non-medical mask. Cover your mouth and your nose.  How to Wear a Face Covering (Mask)  •Follow worldwide, national, and local social distancing guidelines. Keep at least 6 feet (2 meters) between you and others.  •Try not to touch your face. If you get the virus on your hands, you can transfer it to your eyes, nose, or mouth and become infected. You can also transfer it to objects, surfaces, or people.  •Clean and disinfect high-touch surfaces and objects often. Use disinfecting wipes, or make a solution of 4 teaspoons of bleach in 1 quart (4 cups) of water.  •Ask about other vaccines you may need. Get the influenza (flu) vaccine as soon as recommended each year, usually starting in September or October. Get the pneumonia vaccine if recommended. Your healthcare provider can tell you if you should also get other vaccines, and when to get them.  Prevent COVID-19 Infection  Follow social distancing guidelines: National and local social distancing rules vary. Rules and restrictions may change over time as restrictions are lifted. The following are general guidelines:  •Stay home if you are sick or think you may have COVID-19. It is important to stay home if you are waiting for a testing appointment or for test results.  •Avoid close physical contact with anyone who does not live in your home. Do not shake hands with, hug, or kiss a person as a greeting. If you must use public transportation (such as a bus or subway), try to sit or stand away from others. Wear your face covering.  •Avoid in-person gatherings and crowds. Attend virtually if possible.  Follow up with your doctor as directed: Write down your questions so you remember to ask them during your visits.  For more information:   •Centers for Disease Control and Prevention  [address] [place] [address]  Phone: 1-374.597.6113  Web Address: http://www.cdc.gov

## 2022-11-24 NOTE — ED PROVIDER NOTE - RESPIRATORY, MLM
few, scattered expiratory wheezes b/l, no rales or rhonchi b/l. speaking in long full sentences, no accessory muscle use

## 2022-11-24 NOTE — ED PROVIDER NOTE - OBJECTIVE STATEMENT
The pt is a 61 y/o F, who presents to ED c/o nasal congestion, fatigue, malaise, myalgias, dry cough x 1 d. Pt states that had a low grade fever last night (Tmax 100.8), took a tyl yest, no meds taken today. Hx of asthma, not vaccinated for covid. Denies cp, dyspnea, palpitations, n/v/d, abd pain, dizziness, syncope.

## 2022-11-24 NOTE — ED PROVIDER NOTE - CARE PLAN
1 Principal Discharge DX:	Upper respiratory infection   Principal Discharge DX:	2019 novel coronavirus disease (COVID-19)

## 2022-11-24 NOTE — ED PROVIDER NOTE - CLINICAL SUMMARY MEDICAL DECISION MAKING FREE TEXT BOX
pt c/o uri symptoms x 1 d, well appearing, non toxic, no resp distress - no hypoxia, some scant wheezing on exam hence given nebs, suspect covid vs other viral etiology --- tested and + covid, labs consistent w/it, pt well appearing, reports feeling better w/nebs. tachy but suspect 2/2 to albuterol - no pe risk factors/perc neg, stable for dc, symptom control and supportive care discussed, strict return precautions given

## 2022-11-24 NOTE — ED PROVIDER NOTE - ATTENDING APP SHARED VISIT CONTRIBUTION OF CARE
Attending Statement: I have personally performed a face to face diagnostic evaluation on this patient. I have reviewed the ACP note and agree with the history, exam and plan of care, except as noted.     Attending Contribution to Care:  62F with c/o uri symptoms x 1 d, well appearing, non toxic, no resp distress. agree with PA exam, pt tested and + covid, labs consistent w/it, pt well appearing, reports feeling better w/nebs. tachy but suspect 2/2 to albuterol - no pe risk factors/perc neg, stable for dc, symptom control and supportive care discussed, strict return precautions given

## 2022-11-24 NOTE — ED ADULT NURSE NOTE - NSIMPLEMENTINTERV_GEN_ALL_ED
Implemented All Universal Safety Interventions:  [place] to call system. Call bell, personal items and telephone within reach. Instruct patient to call for assistance. Room bathroom lighting operational. Non-slip footwear when patient is off stretcher. Physically safe environment: no spills, clutter or unnecessary equipment. Stretcher in lowest position, wheels locked, appropriate side rails in place.

## 2022-12-02 ENCOUNTER — APPOINTMENT (OUTPATIENT)
Dept: HEART AND VASCULAR | Facility: CLINIC | Age: 62
End: 2022-12-02

## 2022-12-05 RX ORDER — PREDNISONE 20 MG/1
20 TABLET ORAL DAILY
Qty: 10 | Refills: 0 | Status: ACTIVE | COMMUNITY
Start: 2022-12-05 | End: 1900-01-01

## 2022-12-05 RX ORDER — ALBUTEROL SULFATE 2.5 MG/3ML
(2.5 MG/3ML) SOLUTION RESPIRATORY (INHALATION)
Qty: 1 | Refills: 3 | Status: ACTIVE | COMMUNITY
Start: 2022-12-02 | End: 1900-01-01

## 2022-12-07 ENCOUNTER — APPOINTMENT (OUTPATIENT)
Dept: HEART AND VASCULAR | Facility: CLINIC | Age: 62
End: 2022-12-07
Payer: MEDICAID

## 2022-12-07 DIAGNOSIS — M79.89 OTHER SPECIFIED SOFT TISSUE DISORDERS: ICD-10-CM

## 2022-12-07 PROCEDURE — ZZZZZ: CPT

## 2022-12-07 PROCEDURE — 93970 EXTREMITY STUDY: CPT

## 2022-12-07 PROCEDURE — 93306 TTE W/DOPPLER COMPLETE: CPT

## 2022-12-21 NOTE — ED PROVIDER NOTE - CONSTITUTIONAL NEGATIVE STATEMENT, MLM
Regimen: [place]    Dr. Rojas is supervising provider today.    Nursing Assessment: A focused nursing assessment  was performed and the patient reports the following:   Nausea: NO  Vomiting: NO  Fever: NO  Chills: NO  Other signs of infection: NO  Bleeding: NO  Mucositis: NO  Diarrhea: NO  Constipation: NO  Anorexia: NO  Dysuria: NO  Urinary Bleeding: NO  Cough: NO  Shortness of Breath: NO  Fatigue/Weakness: NO  Numbness/Tingling: NO  Other Neuropathies: NO  Edema: NO  Rash: NO  Hand/Foot Syndrome: NO  Anxiety/Depression/Insomnia: NO  Pain: NO    Pre-Treatment: - Treatment consent signed  - Patient has valid pre-authorization  - VS completed  - Height and weight verified  - Treatment parameters verified in patient protocol  - Patient is identified by first & last name, Date of birth that has been verified with the patient chairside.    Treatment: Refer to [facility] and MAR for line assessment and medication administration, Blood return confirmed before, during and after treatment administered and Infusion pump used for non-vesicant drugs    Post Treatment: Treatment tolerated well; no adverse reaction    Education: No new instructions needed    Next appointment scheduled: 1/18/23  Patient instructed to call the office with any questions or concerns.    Patient Discharged: patient discharged to home per self, ambulatory    Administrations This Visit     abatacept (ORENCIA) 750 mg in sodium chloride 0.9 % 100 mL IVPB     Admin Date  12/21/2022 Action  New Bag Dose  750 mg Rate  200 mL/hr Route  Intravenous Administered By  Vandana Seth, LILIAN          sodium chloride 0.9% infusion     Admin Date  12/21/2022 Action  New Bag Dose   Rate  100 mL/hr Route  Intravenous Administered By  Vandana Seth RN                Fall risk 35  ([place] Fall Risk)    Distress Tool   N/A at this time    
no fever and no chills.

## 2023-01-05 ENCOUNTER — APPOINTMENT (OUTPATIENT)
Dept: PULMONOLOGY | Facility: CLINIC | Age: 63
End: 2023-01-05
Payer: MEDICAID

## 2023-01-05 VITALS
HEIGHT: 66 IN | SYSTOLIC BLOOD PRESSURE: 141 MMHG | DIASTOLIC BLOOD PRESSURE: 92 MMHG | BODY MASS INDEX: 29.73 KG/M2 | HEART RATE: 88 BPM | TEMPERATURE: 97.8 F | OXYGEN SATURATION: 95 % | WEIGHT: 185 LBS

## 2023-01-05 DIAGNOSIS — J67.9 HYPERSENSITIVITY PNEUMONITIS DUE TO UNSPECIFIED ORGANIC DUST: ICD-10-CM

## 2023-01-05 PROCEDURE — 99214 OFFICE O/P EST MOD 30 MIN: CPT

## 2023-01-10 ENCOUNTER — APPOINTMENT (OUTPATIENT)
Dept: PULMONOLOGY | Facility: CLINIC | Age: 63
End: 2023-01-10

## 2023-01-24 NOTE — ED ADULT NURSE NOTE - NS ED NOTE ABUSE SUSPICION NEGLECT YN
[facility]   Physical Therapy Discharge note    Name: Marti Coley  Clinic Number: [id number]    Therapy Diagnosis:   Encounter Diagnoses   Name Primary?    Decreased range of motion of left elbow Yes    Weakness of left upper extremity     Left elbow pain          Physician: Marilu Edwards NP    Visit Date: 1/24/2023    Physician Orders: PT Evaluate and Treat   Medical Diagnosis: M70.32 (ICD-10-CM) - Bursitis of left elbow, unspecified bursa  Evaluation Date: 12/5/22  Authorization Period Expiration: 12/31/22  Plan of Care Certification Period: 1/18/23- 3/18/23  Progress Note Due: 2/18/23  Visit # / Visits authorized: 6/20  FOTO: Discharged      Precautions: Progress as tolerated by pt.    Time In: 200pm  Time Out: 255pm  Total Billable Time: 55 minutes      SUBJECTIVE     Pt reports: that she is doing well. Pt states that she is able to tolerate carrying groceries, lifting objects overhead, and driving with no instance of elbow pain. Patient stated that the incision still causes some tenderness, but she is used to it.    She was compliant with home exercise program.  Response to previous treatment: decreased L elbow pain and improved ability to reach overhead  Functional change: improved strength L elbow for driving and carrying groceries, Pt self reports that she is nearing her baseline.     Pain: 0/10  Location: left elbow      OBJECTIVE       1/18/23:    Observation: Pt no longer wearing sling or immobilizer donned to L elbow  Integumentary: Pt's surgical incision healing well with no signs/symptoms of infection. PT educated pt on the signs/symptoms of infection (increased HR, BP, fever, etc.).             Elbow  Right    Left   Pain/Dysfunction with Movement     AROM MMT AROM MMT     flexion 145 5 140 4+     extension 0 5 +5 4+ Slightly lacking extension   pronation 60 5 60 5     supination 60 5 60 5           Upper Extremity Strength  (R) UE   (L) UE     Shoulder flexion:  "5/5 Shoulder flexion: 5/5   Shoulder Abduction: 5/5 Shoulder abduction: 5/5   Shoulder ER 5/5 Shoulder ER 5/5   Shoulder IR 5/5 Shoulder IR 5/5   Wrist flexion: 5/5 Wrist flexion: 5/5   Wrist extension: 5/5 Wrist extension: 5/5         Palpation: Pt no longer TTP to olecranon        Sensation: BUE grossly intact    TREATMENT       Charges based on 1:1 tx:  Marti received the treatments listed below:      Patient received therapeutic exercises for 45 minutes for improved strength and AROM including:  L shoulder ER/IR, red band, 30x ea  +Rows vs green TB 3x12 repetitions  +Bicep curls vs 4# 3x10 repetitions  Towel wringing vs red therabar 30x 5" hold  U and Rainbows vs red therabar 30x5" hold  Velcro board, large paddle twist, x3 min  Tricep kickback vs 3# 3x10 repetitions  +Shoulder extension vs GTB 3x10 repetitions  +Tricep Elbow extension vs GTB 3x12 repetitions  Wall pushups 20x  +Body blade with elbow at side and 90 degrees flexion 20" x3 repetitions  +Supine Alphabet vs 3# 2 sets  +Stat[place] of [place] carries around gym 3# , 3 laps with a break between each lap      Pt received therapeutic activities for 10 minutes for improved tolerance to functional activities including:  UBE 3min/3min  Ball circles on wall for L elbow endurance 30x CW/ 30x CCW with red weighted ball today        PATIENT EDUCATION AND HOME EXERCISES     Home Exercises Provided and Patient Education Provided     Education provided:   PT educated pt on importance of compliance with their HEP this visit.     Written Home Exercises Provided: Patient instructed to cont prior HEP. Exercises were reviewed and Marti was able to demonstrate them prior to the end of the session.  Marti demonstrated good  understanding of the education provided. See EMR under Patient Instructions for exercises provided during therapy sessions    ASSESSMENT   Pt demonstrates improved A/PROM, strength, and overall functional mobility. She stated that she " occasionally gets tenderness to light touch sensation at the incision, but she denies any functional limitations with left upper extremity use. She stated that she is able to perform her exercise program, activities of daily living, and recreational activity without pain or limitation. She is independent with home exercise program and feels ready to be DC from outpatient physical therapy.    Marti Is progressing well towards her goals.   Pt prognosis is Good.     Pt will continue to benefit from skilled outpatient physical therapy to address the deficits listed in the problem list box on initial evaluation, provide pt/family education and to maximize pt's level of independence in the home and community environment.     Pt's spiritual, cultural and educational needs considered and pt agreeable to plan of care and goals.     Anticipated barriers to physical therapy: None      Goals:  Short Term Goals (6 Weeks):   1. Patient will increase L Elbow flexion AROM to 140 degrees to improve functional reach (MET, 1/18/23)  2. Patient will increase L Elbow extension to 0 degrees to improve overhead reach (progressing, not met)    3. Patient to report < or =2/10 pain in L Elbow with ADL's (MET, 1/18/23)  4. Patient to be able to drive without limitation or difficulty (MET, 1/18/23)  Long Term Goals (12 Weeks):   1. Patient to have decreased subjective report of disability as noted by a score of <40% on the FOTO elbow questionnaire (MET, 1/18/23)  2. Patient to be independent with home exercise program for improved self management of condition  (MET, 1/18/23)  3. Patient will increase strength in L upper extremity to 4+/5 or greater for return to recreation  (MET, 1/18/23)  4. Patient to be able to carry 7# 80 ft with little to no difficulty for ease with household chores and shopping   (MET, 1/18/23)    PLAN   DC from outpatient physical therapy with home exercise program.         Shiva Nunes, PT                       lenard   No

## 2023-02-02 ENCOUNTER — APPOINTMENT (OUTPATIENT)
Dept: PULMONOLOGY | Facility: CLINIC | Age: 63
End: 2023-02-02
Payer: MEDICAID

## 2023-02-02 PROCEDURE — 94070 EVALUATION OF WHEEZING: CPT

## 2023-02-02 PROCEDURE — 95070 INHLJ BRNCL CHALLENGE TSTG: CPT

## 2023-02-03 ENCOUNTER — APPOINTMENT (OUTPATIENT)
Dept: INTERNAL MEDICINE | Facility: CLINIC | Age: 63
End: 2023-02-03
Payer: MEDICAID

## 2023-02-03 VITALS
WEIGHT: 184 LBS | BODY MASS INDEX: 29.57 KG/M2 | OXYGEN SATURATION: 98 % | HEIGHT: 66 IN | DIASTOLIC BLOOD PRESSURE: 75 MMHG | SYSTOLIC BLOOD PRESSURE: 119 MMHG | HEART RATE: 89 BPM | TEMPERATURE: 97.7 F

## 2023-02-03 DIAGNOSIS — R07.9 CHEST PAIN, UNSPECIFIED: ICD-10-CM

## 2023-02-03 PROBLEM — M79.89 LEFT ARM SWELLING: Status: ACTIVE | Noted: 2023-02-03

## 2023-02-03 PROCEDURE — 99204 OFFICE O/P NEW MOD 45 MIN: CPT

## 2023-02-03 PROCEDURE — 99386 PREV VISIT NEW AGE 40-64: CPT
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
## 2023-02-03 NOTE — PHYSICAL EXAM
[No Acute Distress] : no acute distress [Normal Rate/Rhythm] : normal rate/rhythm [Normal S1, S2] : normal s1, s2 [No Murmurs] : no murmurs [No Resp Distress] : no resp distress [No Edema] : no edema [TextBox_68] : one wheeze

## 2023-02-03 NOTE — ASSESSMENT
[FreeTextEntry1] : Data reviewed: \par \par Echo 12/2022 mild LV diastolic dysfn otherwise normal\par \par CT chest [facility] 9/2020 : diffuse mosaic attenuation\par CT chest [facility] 10/2022 personally reviewed : marked decrease in ground glass compared to prior scan\par \par PFT 8/19/20 [facility]: FVC 1.41L (43%), FEV1 1.24L (48%), TLC 3.37L (69%), DLCO 10.4 (48%)\par PFT office 8/22/22: FVC 2.62L (83%), FEV1 2.02L (82%), TLC 3.54L (73%), DLCO 14.13 (66%) / FENO 14\par \par BAL 9/2020: lymphs 41% and 23% in separate samples; TBBx nondiagnostic\par \par Impression:\par HP, diagnosis on basis of imaging and BAL lymphocytosis - 2020\par Asthma - clinical dx, not clearly correct\par Covid 11/2022\par \par Plan:\par Her symptoms vary a bit from visit to visit and her willingness to take daily meds is uncertain. The exacerbating nature of her disease is more consistent with asthma but her spirometries are normal and she hasn't fully responded to Symbicort though also doesn't take it regularly.\par I will bring her in for a MCT off Symbicort to try to clarify this diagnosis and if positive will encourage her to use her inhalers are prescribed.\par For MCT - is in window to not require Covid testing as Covid positive at [facility] 11/24/22.\par She can complete her workup with Dr Mendosa and then if still unclear dx will get CPET.\par --\par MCT 2/2/23: negative for BHR / LM on VM

## 2023-02-06 PROBLEM — R07.9 CHEST PAIN: Status: ACTIVE | Noted: 2022-11-11

## 2023-02-06 NOTE — HISTORY OF PRESENT ILLNESS
[de-identified] : 63 yo F with pmhx of ?asthma who presents to establish care and with multiple complaints today\par \par 1.Diffuse body aches- states that she has been having b/l hip pain that worsens with movement. Pain intermittently radiates to right ankle with intermittent swelling of ankle, currently without pain or swelling. Also with back pain vs kidney pain? whenever she takes deep breaths. Primarily sedentary, states she is limited by her ?asthma and finds it hard to move around. Pain more prominent when starting to mobilize after sitting for a long period of time. \par 2. ?Asthma- no official diagnosis- follows with pulm, takes inhalers PRN, underwent Methacholine challenge yesterday, awaiting results \par 3. ?Chest pain- currently without cp, mainly with MONTERO, following with cardiology , awaiting CCTA\par 4. with persistent breast pain for several years- mammo and US with L cyst no other findings, no fevers, weight loss, sking changes, erythema

## 2023-02-06 NOTE — HEALTH RISK ASSESSMENT
[Poor] : ~his/her~ current health as poor [Fair] :  ~his/her~ mood as fair [No] : In the past 12 months have you used drugs other than those required for medical reasons? No [No falls in past year] : Patient reported no falls in the past year [With Significant Other] : lives with significant other [Retired] : retired [] :  [Former] : Former [20 or more] : 20 or more

## 2023-02-06 NOTE — PHYSICAL EXAM
[No Acute Distress] : no acute distress [Well Nourished] : well nourished [No Edema] : there was no peripheral edema [Normal Appearance] : normal in appearance [Normal] : no rash [Coordination Grossly Intact] : coordination grossly intact [No Focal Deficits] : no focal deficits

## 2023-02-08 ENCOUNTER — NON-APPOINTMENT (OUTPATIENT)
Age: 63
End: 2023-02-08

## 2023-02-11 ENCOUNTER — EMERGENCY (EMERGENCY)
Facility: HOSPITAL | Age: 63
LOS: 1 days | Discharge: HOME | End: 2023-02-11
Attending: EMERGENCY MEDICINE | Admitting: EMERGENCY MEDICINE
Payer: COMMERCIAL

## 2023-02-11 VITALS
WEIGHT: 184.09 LBS | OXYGEN SATURATION: 95 % | RESPIRATION RATE: 16 BRPM | DIASTOLIC BLOOD PRESSURE: 72 MMHG | HEIGHT: 64 IN | SYSTOLIC BLOOD PRESSURE: 115 MMHG | HEART RATE: 108 BPM | TEMPERATURE: 98 F

## 2023-02-11 DIAGNOSIS — Z98.89 OTHER SPECIFIED POSTPROCEDURAL STATES: Chronic | ICD-10-CM

## 2023-02-11 DIAGNOSIS — N64.4 MASTODYNIA: ICD-10-CM

## 2023-02-11 DIAGNOSIS — Z90.49 ACQUIRED ABSENCE OF OTHER SPECIFIED PARTS OF DIGESTIVE TRACT: Chronic | ICD-10-CM

## 2023-02-11 DIAGNOSIS — N60.02 SOLITARY CYST OF LEFT BREAST: ICD-10-CM

## 2023-02-11 DIAGNOSIS — Z91.041 RADIOGRAPHIC DYE ALLERGY STATUS: ICD-10-CM

## 2023-02-11 PROCEDURE — 99284 EMERGENCY DEPT VISIT MOD MDM: CPT

## 2023-02-11 RX ORDER — IBUPROFEN 200 MG
600 TABLET ORAL ONCE
Refills: 0 | Status: COMPLETED | OUTPATIENT
Start: 2023-02-11 | End: 2023-02-11

## 2023-02-11 RX ORDER — TRAMADOL HYDROCHLORIDE 50 MG/1
25 TABLET ORAL ONCE
Refills: 0 | Status: DISCONTINUED | OUTPATIENT
Start: 2023-02-11 | End: 2023-02-11

## 2023-02-11 RX ORDER — TRAMADOL HYDROCHLORIDE 50 MG/1
1 TABLET ORAL
Qty: 14 | Refills: 0
Start: 2023-02-11 | End: 2023-02-17

## 2023-02-11 RX ADMIN — Medication 600 MILLIGRAM(S): at 02:01

## 2023-02-11 RX ADMIN — TRAMADOL HYDROCHLORIDE 25 MILLIGRAM(S): 50 TABLET ORAL at 02:02

## 2023-02-11 NOTE — ED PROVIDER NOTE - CPE EDP GASTRO NORM
DEL met with the pt. At bedside. The pt. Lives with her  in a 2 level home with the bedrooms on the 2nd floor. The pt. Reports being independent prior to admission with adls, ambulation and driving. The pt.  Has 3 children, 1 in the area, 1 at home normal...

## 2023-02-11 NOTE — ED ADULT NURSE NOTE - OBJECTIVE STATEMENT
62F c/o left breast cyst associated with pain x months. states she cannot tolerate the pain anymore. pt did not take any pain reliever at home. denies n/v/d, fevers/chills, CP/SOB. pt speaking in clear, complete sentences. RR easy, even, un-labored on room air

## 2023-02-11 NOTE — ED PROVIDER NOTE - NSFOLLOWUPINSTRUCTIONS_ED_ALL_ED_FT
Breast Cyst       A breast cyst is a sac in the breast that is filled with fluid. They are usually noncancerous (benign) and are common among women. Breast cysts are most often in the upper, outer portion of the breast. One or more cysts may develop. They form when fluid builds up inside the breast glands.    There are several types of breast cysts. Some are too small to feel, but these can be seen with imaging tests such as an X-ray of the breast (mammogram) or ultrasound.    Breast cysts do not increase your risk of breast cancer. They usually disappear after you no longer have a menstrual cycle (after menopause), unless you take artificial hormones (are on hormone therapy).      What are the causes?    This condition may be caused by:•Blockage of tubes (ducts) in the breast glands, which leads to fluid buildup. Duct blockage may result from:  •Fibrocystic breast changes. This is a common, benign condition that occurs when women go through hormonal changes during the menstrual cycle. This is a common cause of multiple breast cysts.      •Overgrowth of breast tissue or breast glands.      •Scar tissue in the breast from previous surgery.        •Changes in certain female hormones (estrogen and progesterone).      The exact cause of this condition is not known.      What increases the risk?    You may be more likely to develop breast cysts if you have not gone through menopause.      What are the signs or symptoms?    Symptoms of this condition include:  •Feeling one or more smooth, round, soft lumps (like grapes) in the breast that are easily movable. The lump or lumps may get bigger and more painful before your menstrual period and get smaller after your menstrual period.      •Breast discomfort or pain.        How is this diagnosed?    This condition may be diagnosed based on:  •A physical exam. A cyst can be felt by your health care provider during this exam.      •Imaging tests, such as mammogram or ultrasound.      Fluid may be removed from the cyst with a needle (fine-needle aspiration) and tested to make sure the cyst is not cancerous.      How is this treated?    Treatment may not be needed for this condition. Your health care provider may monitor the cyst to see if it goes away on its own. If the cyst is uncomfortable or gets bigger, or if you do not like how the cyst makes your breast look, you may need treatment. Treatment may include:  •Hormone therapy.      •Fine-needle aspiration to drain fluid from the cyst. There is a chance of the cyst coming back (recurring) after aspiration.      •Surgery to remove the cyst.        Follow these instructions at home:      Self-exams    •Do a breast self-exam every month, or as often as directed. A breast self-exam involves:  •Comparing your breasts in the mirror.      •Looking for visible changes in your skin or nipples.      •Feeling for lumps or changes.        •Having many breast cysts may make it harder to feel for new lumps. Understand how your breasts normally look and feel, and write down any changes in your breasts. Tell your health care provider about any changes.      Eating and drinking     •Follow instructions from your health care provider about eating and drinking restrictions.      •Drink enough fluid to keep your urine pale yellow.      •Avoid caffeine.      •Cut down on salt (sodium) in what you eat and drink, especially before your menstrual period. Too much sodium can cause fluid buildup, breast swelling, and discomfort.      General instructions   •See your health care provider regularly.  •Get a yearly physical exam.      •If you are 20–40 years old, get a clinical breast exam every 1–3 years. After the age of 40 years, get this exam every year.      •Get mammograms as often as directed.        •Take over-the-counter and prescription medicines only as told by your health care provider.      •Wear a supportive bra, especially when exercising.      •Keep all follow-up visits as told by your health care provider. This is important.        Contact a health care provider if:    •You feel, or think you feel, a lump in your breast.      •You notice that both breasts look or feel different than usual.      •Your breast is still causing pain after your menstrual period is over.      •You find new lumps or bumps that were not there before.      •You feel lumps in your armpit.        Get help right away if:    •You have severe pain, tenderness, redness, or warmth in your breast.      •You have fluid or blood leaking from your nipple.      •Your breast lump becomes hard and painful.      •You notice dimpling or wrinkling of the breast or nipple.        Summary    •A breast cyst is a sac in the breast that is filled with fluid.      •Treatment may not be needed for this condition.      •If the cyst is uncomfortable or gets bigger, or if you do not like how the cyst makes your breast look, you may need treatment.      This information is not intended to replace advice given to you by your health care provider. Make sure you discuss any questions you have with your health care provider.      Document Revised: 05/05/2020 Document Reviewed: 05/05/2020    Elsevier Patient Education © 2022 [employer]

## 2023-02-11 NOTE — ED PROVIDER NOTE - PRO INTERPRETER NEED 2
Viral illnesses can last 7-14 days. Unfortunately, antibiotics do not change the course of the illness. If you develop an uncontrolled fever or shortness of breath after your visit today, please contact us, return or seek further evaluation immediately. Encouraged to continue rest, fluids, throat lozenges, ibuprofen or Tylenol as needed, warm mist, humidified or steam showers for symptom relief and control. Suggested Mucinex DM (generic brand) maximum strength 1 tablet every 12 hours over the counter as needed as directed on the package for cough/congestion. Please read the patient education material given to you today for further information and guidance. Please follow up with you primary care provider in 3-5 days.          Patient Education     Viral Syndrome (Adult)  A viral illness may cause a number of symptoms such as fever. Other symptoms depend on the part of the body that the virus affects. If it settles in your nose, throat, and lungs, it may cause cough, sore throat, congestion, runny nose, headache, earache and other ear symptoms, or shortness of breath. If it settles in your stomach and intestinal tract, it may cause nausea, vomiting, cramping, and diarrhea. Sometimes it causes generalized symptoms like \"aching all over,\" feeling tired, loss of energy, or loss of appetite.  A viral illness usually lasts anywhere from several days to several weeks, but sometimes it lasts longer. In some cases, a more serious infection can look like a viral syndrome in the first few days of the illness. You may need another exam and additional tests to know the difference. Watch for the warning signs listed below for when to seek medical advice.  Home care  Follow these guidelines for taking care of yourself at home:  · If symptoms are severe, rest at home for the first 2 to 3 days.  · Stay away from cigarette smoke - both your smoke and the smoke from others.  · You may use over-the-counter acetaminophen or ibuprofen for  fever, muscle aching, and headache, unless another medicine was prescribed for this. If you have chronic liver or kidney disease or ever had a stomach ulcer or gastrointestinal bleeding, talk with your healthcare provider before using these medicines. No one who is younger than 18 and ill with a fever should take aspirin. It may cause severe disease or death.  · Your appetite may be poor, so a light diet is fine. Avoid dehydration by drinking 8 to 12, 8-ounce glasses of fluids each day. This may include water; orange juice; lemonade; apple, grape, and cranberry juice; clear fruit drinks; electrolyte replacement and sports drinks; and decaffeinated teas and coffee. If you have been diagnosed with a kidney disease, ask your healthcare provider how much and what types of fluids you should drink to prevent dehydration. If you have kidney disease, drinking too much fluid can cause it build up in the your body and be dangerous to your health.  · Over-the-counter remedies won't shorten the length of the illness but may be helpful for symptoms such as cough, sore throat, nasal and sinus congestion, or diarrhea. Don't use decongestants if you have high blood pressure.  Follow-up care  Follow up with your healthcare provider if you do not improve over the next week.  Call 911  Call 911 if any of the following occur:  · Convulsion  · Feeling weak, dizzy, or like you are going to faint  · Chest pain, or more than mild shortness of breath   When to seek medical advice  Call your healthcare provider right away if any of these occur:  · Cough with lots of colored sputum (mucus) or blood in your sputum  · Chest pain, shortness of breath, wheezing, or trouble breathing  · Severe headache; face, neck, or ear pain  · Severe, constant pain in the lower right side of your belly (abdominal)  · Continued vomiting (can’t keep liquids down)  · Frequent diarrhea (more than 5 times a day); blood (red or black color) or mucus in  diarrhea  · Feeling weak, dizzy, or like you are going to faint  · Extreme thirst  · Fever of 100.4°F (38°C) or higher, or as directed by your healthcare provider  Date Last Reviewed: 4/1/2018 © 2000-2018 [employer]. [address] [place] [id number]. All rights reserved. This information is not intended as a substitute for professional medical care. Always follow your healthcare professional's instructions.            English

## 2023-02-11 NOTE — ED PROVIDER NOTE - OBJECTIVE STATEMENT
62 F co L breast pain- pain has been present for several weeks, recent imaging showed cyst- has fu appt- motrin taken 2 days ago w relief of pain- no meds taken today for pain  no f/c no redness/warmth no ho abscess  pain is mod  no sig exac factors

## 2023-02-11 NOTE — ED PROVIDER NOTE - CLINICAL SUMMARY MEDICAL DECISION MAKING FREE TEXT BOX
bedside US- small cyst to breast no post acoustic enhancement- no redness/warmth/induration/fluctuance to skin- px had not taken pain meds pta  improved w meds- has fu next week w breast surgery- si/sx to return to ED d/w px

## 2023-02-11 NOTE — ED PROVIDER NOTE - PATIENT PORTAL LINK FT
You can access the FollowMyHealth Patient Portal offered by [facility] by registering at the following website: [email or web address]. By joining [employer]’s FollowMyHealth portal, you will also be able to view your health information using other applications (apps) compatible with our system.

## 2023-02-13 ENCOUNTER — RESULT REVIEW (OUTPATIENT)
Age: 63
End: 2023-02-13

## 2023-02-13 ENCOUNTER — APPOINTMENT (OUTPATIENT)
Dept: RADIOLOGY | Facility: HOSPITAL | Age: 63
End: 2023-02-13

## 2023-02-13 ENCOUNTER — APPOINTMENT (OUTPATIENT)
Dept: ULTRASOUND IMAGING | Facility: HOSPITAL | Age: 63
End: 2023-02-13

## 2023-02-13 ENCOUNTER — OUTPATIENT (OUTPATIENT)
Dept: OUTPATIENT SERVICES | Facility: HOSPITAL | Age: 63
LOS: 1 days | End: 2023-02-13
Payer: COMMERCIAL

## 2023-02-13 DIAGNOSIS — Z90.49 ACQUIRED ABSENCE OF OTHER SPECIFIED PARTS OF DIGESTIVE TRACT: Chronic | ICD-10-CM

## 2023-02-13 DIAGNOSIS — Z98.89 OTHER SPECIFIED POSTPROCEDURAL STATES: Chronic | ICD-10-CM

## 2023-02-13 PROCEDURE — 77080 DXA BONE DENSITY AXIAL: CPT | Mod: 26

## 2023-02-13 PROCEDURE — 77080 DXA BONE DENSITY AXIAL: CPT

## 2023-02-13 PROCEDURE — 76882 US LMTD JT/FCL EVL NVASC XTR: CPT | Mod: 26,LT

## 2023-02-13 PROCEDURE — 76882 US LMTD JT/FCL EVL NVASC XTR: CPT

## 2023-02-15 ENCOUNTER — NON-APPOINTMENT (OUTPATIENT)
Age: 63
End: 2023-02-15

## 2023-02-17 ENCOUNTER — APPOINTMENT (OUTPATIENT)
Dept: BREAST CENTER | Facility: CLINIC | Age: 63
End: 2023-02-17
Payer: MEDICAID

## 2023-02-17 VITALS — HEIGHT: 64 IN | WEIGHT: 184 LBS | HEART RATE: 88 BPM | BODY MASS INDEX: 31.41 KG/M2 | OXYGEN SATURATION: 98 %

## 2023-02-17 PROCEDURE — 99204 OFFICE O/P NEW MOD 45 MIN: CPT

## 2023-02-17 PROCEDURE — 99214 OFFICE O/P EST MOD 30 MIN: CPT

## 2023-02-17 NOTE — DATA REVIEWED
Problem: Patient Care Overview  Goal: Plan of Care/Patient Progress Review  Outcome: No Change  Pt a/o x4. Independent to BR. IV SL patent. VSS on RA, denies pain. Tolerating regular diet, adequate PO intake. Left elbow bursitis, redness and swelling within border; pt states improvement.  ID following. D/C to outpatient clinic today pending progress, nursing will continue to monitor.        [FreeTextEntry1] : 8/18/22 ([facility]) B/L sMMG/US: scattered areas of fbg density. \par MMG: There is a 12 m mass in the LEFT breast 4:00 location 2 cm from the nipple. This corresponds to a cyst on subsequent ultrasound and is stable since 2020. \par US: Left breast 4:00 retroareolar location there is a 15 mm cyst.\par No mammographic or US evidence of malignancy. RECOMMENDATION:  Mammography in 1 year. BI-RADS 2 - Benign \par

## 2023-02-17 NOTE — HISTORY OF PRESENT ILLNESS
[FreeTextEntry1] : 62 year old female was referred by Dr. Viktoriya Melgar (PCP) who presents for initial evaluation regarding left breast cyst. B/L sMMG/US 8/18/22 (BIRADS-2) revealed a 12 m mass in the LEFT breast 4:00 location 2cmfn, corresponding to a 15mm cyst on subsequent ultrasound with note that it is stable since 2020. Patient reports associated pain at area of cyst for years, worse since most recent imaging in Aug 2022, which prompted her to go to [facility] ED earlier this week for assessment, no intervention was made. Patient reports she takes Ibuprofen to minimal relief. Patient denies nipple discharge, skin changes. Denies prior breast surgeries or biopsies. \par \par Patient denies family history of breast cancer. Denies famhx of ovarian cancer.\par Jeanna Lifetime Risk 5.4%\par \par \par

## 2023-02-17 NOTE — ASSESSMENT
[FreeTextEntry1] : 62 year old female was referred by Dr. Viktoriya Melgar (PCP) who presents for initial evaluation regarding left breast cyst. B/L sMMG/US 8/18/22 (BIRADS-2) revealed a 12 m mass in the LEFT breast 4:00 location 2cmfn, corresponding to a 15mm cyst on subsequent ultrasound with note that it is stable since 2020. Patient reports associated pain at area of cyst for years, worse since most recent imaging in Aug 2022, which prompted her to go to [facility] ED earlier this week for assessment, no intervention was made. Jeanna  5.4%\par \par Discussed with the patient that mastalgia is not a common sign of breast cancer. I reviewed the use of OTC EPO (high dose EPO handout was provided), decreasing caffeine intake (includes coffee, tea, chocolate, energy drinks, soda), wearing a sports bra, and OTC Salonpas patches. \par \par Given increase in pain since previous imaging 6 months prior, plan for targeted L US and possible cyst aspiration if indicated by radiologist. If benign, plan for annual breast imaging and re-examination in August 2023. Patient verbalizes understanding and is in agreement with the plan.\par \par \par \par \par

## 2023-02-17 NOTE — PHYSICAL EXAM
[Supple] : supple [No Supraclavicular Adenopathy] : no supraclavicular adenopathy [Examined in the supine and seated position] : examined in the supine and seated position [No dominant masses] : no dominant masses in right breast  [No Nipple Retraction] : no left nipple retraction [No Nipple Discharge] : no left nipple discharge [No Axillary Lymphadenopathy] : no left axillary lymphadenopathy [No dominant masses] : no dominant masses left breast [de-identified] : tenderness to palpation lower outer quadrant

## 2023-02-28 ENCOUNTER — APPOINTMENT (OUTPATIENT)
Dept: ULTRASOUND IMAGING | Facility: HOSPITAL | Age: 63
End: 2023-02-28

## 2023-03-16 ENCOUNTER — APPOINTMENT (OUTPATIENT)
Dept: INTERNAL MEDICINE | Facility: CLINIC | Age: 63
End: 2023-03-16

## 2023-04-20 ENCOUNTER — APPOINTMENT (OUTPATIENT)
Dept: INTERNAL MEDICINE | Facility: CLINIC | Age: 63
End: 2023-04-20
Payer: MEDICAID

## 2023-04-20 VITALS
OXYGEN SATURATION: 96 % | BODY MASS INDEX: 31.76 KG/M2 | DIASTOLIC BLOOD PRESSURE: 80 MMHG | WEIGHT: 185 LBS | SYSTOLIC BLOOD PRESSURE: 140 MMHG | HEART RATE: 100 BPM | TEMPERATURE: 98.1 F

## 2023-04-20 DIAGNOSIS — R52 PAIN, UNSPECIFIED: ICD-10-CM

## 2023-04-20 DIAGNOSIS — Z13.820 ENCOUNTER FOR SCREENING FOR OSTEOPOROSIS: ICD-10-CM

## 2023-04-20 DIAGNOSIS — J45.909 UNSPECIFIED ASTHMA, UNCOMPLICATED: ICD-10-CM

## 2023-04-20 DIAGNOSIS — R06.00 DYSPNEA, UNSPECIFIED: ICD-10-CM

## 2023-04-20 DIAGNOSIS — E66.9 OBESITY, UNSPECIFIED: ICD-10-CM

## 2023-04-20 PROCEDURE — 99214 OFFICE O/P EST MOD 30 MIN: CPT | Mod: 25

## 2023-04-20 NOTE — PROGRESS NOTE ADULT - PROBLEM/PLAN-4
Patient is requesting a medication refill - medication is on active medication list    Patient is currently OUT of the requested medication. PHARMACY IS REJECTING THE REFILL     Duration: 30 days   HydroCHLOROthiazide 25 MG Oral Tablet New     Add as: hydrochlorothiazide (HYDRODIURIL) 25 MG tablet     TAKE 1 TABLET BY MOUTH ONE TIME A DAY         Pharmacy  No Pharmacies Listed    Caller Information       Type Contact Phone    12/04/2018 11:31 AM Phone (Incoming) Ana Deluca (Self) [phone] (H)          Alternative phone number:[phone]     Turnaround time given to caller:   \"This message will be sent to [state Provider's name]. The clinical team will fulfill your request as soon as they review your message.\"  
Patient is requesting a medication refill - medication is on active medication list    hydrochlorothiazide (HYDRODIURIL) 25 MG tablet 1 tablet, Oral, DAILY     losartan (COZAAR) 25 MG tablet 1 tablet, Oral, DAILY     Per pt insurance requires meds to be filled by mail order pharmacy listed below    Patient is currently OUT of the requested medication.    Duration: 90 days    Pharmacy    [facility] - [place], [place] - [address] [place] [id number]  Phone: [phone] Fax: [phone]      Caller Information       Type Contact Phone    12/04/2018 11:31 AM Phone (Incoming) Ana Deluca (Self) [phone] (H)          Alternative phone number: n/a    Turnaround time given to caller:   \"This message will be sent to [state Provider's name]. The clinical team will fulfill your request as soon as they review your message.\"  
wife
DISPLAY PLAN FREE TEXT

## 2023-04-21 PROBLEM — E66.9 OBESITY, CLASS I, BMI 30-34.9: Status: ACTIVE | Noted: 2022-07-22

## 2023-04-21 PROBLEM — J45.909 ASTHMA: Status: ACTIVE | Noted: 2022-07-22

## 2023-04-21 PROBLEM — R52 BODY ACHES: Status: ACTIVE | Noted: 2023-02-06

## 2023-04-21 PROBLEM — Z13.820 OSTEOPOROSIS SCREENING: Status: ACTIVE | Noted: 2023-02-03

## 2023-04-21 PROBLEM — R06.00 DYSPNEA: Status: ACTIVE | Noted: 2020-08-19

## 2023-04-21 LAB
CHOLEST SERPL-MCNC: 195 MG/DL
ESTIMATED AVERAGE GLUCOSE: 146 MG/DL
HBA1C MFR BLD HPLC: 6.7 %
HDLC SERPL-MCNC: 37 MG/DL
LDLC SERPL CALC-MCNC: 81 MG/DL
NONHDLC SERPL-MCNC: 157 MG/DL
TRIGL SERPL-MCNC: 380 MG/DL

## 2023-05-23 ENCOUNTER — APPOINTMENT (OUTPATIENT)
Dept: ULTRASOUND IMAGING | Facility: HOSPITAL | Age: 63
End: 2023-05-23

## 2023-05-23 ENCOUNTER — APPOINTMENT (OUTPATIENT)
Dept: MAMMOGRAPHY | Facility: HOSPITAL | Age: 63
End: 2023-05-23

## 2023-05-23 ENCOUNTER — APPOINTMENT (OUTPATIENT)
Dept: RADIOLOGY | Facility: HOSPITAL | Age: 63
End: 2023-05-23

## 2023-07-26 NOTE — ED PROVIDER NOTE - CONSTITUTIONAL MOOD
appropriate Topical Sulfur Applications Pregnancy And Lactation Text: This medication is considered safe during pregnancy and breast feeding secondary to limited systemic absorption.

## 2023-08-07 ENCOUNTER — NON-APPOINTMENT (OUTPATIENT)
Age: 63
End: 2023-08-07

## 2023-08-15 ENCOUNTER — NON-APPOINTMENT (OUTPATIENT)
Age: 63
End: 2023-08-15

## 2023-08-25 ENCOUNTER — RESULT REVIEW (OUTPATIENT)
Age: 63
End: 2023-08-25

## 2023-08-25 ENCOUNTER — APPOINTMENT (OUTPATIENT)
Dept: BREAST CENTER | Facility: CLINIC | Age: 63
End: 2023-08-25
Payer: MEDICAID

## 2023-08-25 VITALS
SYSTOLIC BLOOD PRESSURE: 129 MMHG | HEART RATE: 82 BPM | WEIGHT: 190 LBS | BODY MASS INDEX: 32.44 KG/M2 | HEIGHT: 64 IN | OXYGEN SATURATION: 98 % | DIASTOLIC BLOOD PRESSURE: 88 MMHG

## 2023-08-25 DIAGNOSIS — N64.4 MASTODYNIA: ICD-10-CM

## 2023-08-25 DIAGNOSIS — Z12.39 ENCOUNTER FOR OTHER SCREENING FOR MALIGNANT NEOPLASM OF BREAST: ICD-10-CM

## 2023-08-25 DIAGNOSIS — N60.02 SOLITARY CYST OF LEFT BREAST: ICD-10-CM

## 2023-08-25 PROCEDURE — 99214 OFFICE O/P EST MOD 30 MIN: CPT

## 2023-08-28 NOTE — ASSESSMENT
[FreeTextEntry1] : 63 year old female, patient of Dr. Viktoriya Melgar (PCP), presents for follow up with history of left breast cyst. B/L sMMG/US 8/18/22 (BIRADS-2) revealed a 12 m mass in the LEFT breast 4:00 location 2cmfn, corresponding to a 15mm cyst on subsequent ultrasound with note that it is stable since 2020. Patient reports associated pain at area of cyst for years, worse since most recent imaging in Aug 2022, which prompted her to go to [facility] ED in Feb 2023 for assessment, no intervention was made.   Again discussed with the patient that mastalgia is not a common sign of breast cancer. I reviewed the use of OTC EPO (high dose EPO handout was provided).   Jeanna  5.4%. Physical exam reveals tenderness to palpation lower outer quadrant. Patient is also due for annual breast imaging. Plan for B/L DX MMG, B/L US and possible aspiration of left breast cyst, as indicated by radiologist. If benign, plan for annual imaging and re-examination in 1 year. Patient verbalizes understanding and is in agreement with the plan.

## 2023-08-28 NOTE — PLAN
[TextEntry] : -B/L DX MMG, B/L US and possible aspiration of left breast cyst as indicated by radiologist -If benign, annual breast imaging & RTC in Aug 2024

## 2023-08-28 NOTE — HISTORY OF PRESENT ILLNESS
[FreeTextEntry1] : 63 year old female, patient of Dr. Viktoriya Melgar (PCP), presents for follow up with history of left breast cyst. B/L sMMG/US 8/18/22 (BIRADS-2) revealed a 12 m mass in the LEFT breast 4:00 location 2cmfn, corresponding to a 15mm cyst on subsequent ultrasound with note that it is stable since 2020. Patient reports associated pain at area of cyst for years, worse since most recent imaging in Aug 2022, which prompted her to go to [facility] ED in Feb 2023 for assessment, no intervention was made. Patient reports she takes Ibuprofen to minimal relief. AT LOV in Feb 2023, advised patient trial of OTC EPO (high dose EPO handout was provided), decreasing caffeine intake (includes coffee, tea, chocolate, energy drinks, soda), wearing a sports bra, and OTC Salonpas patches for symptom management. Patient states she only tried OTC Salonpas once to no relief.    Patient denies nipple discharge, skin changes. Denies prior breast surgeries or biopsies. Patient is due for annual breast imaging.  Patient denies family history of breast cancer. Denies famhx of ovarian cancer. Jeanna Lifetime Risk 5.4%

## 2023-09-26 ENCOUNTER — APPOINTMENT (OUTPATIENT)
Dept: MAMMOGRAPHY | Facility: HOSPITAL | Age: 63
End: 2023-09-26

## 2023-09-26 ENCOUNTER — APPOINTMENT (OUTPATIENT)
Dept: ULTRASOUND IMAGING | Facility: HOSPITAL | Age: 63
End: 2023-09-26

## 2023-10-05 ENCOUNTER — APPOINTMENT (OUTPATIENT)
Dept: MAMMOGRAPHY | Facility: HOSPITAL | Age: 63
End: 2023-10-05
Payer: MEDICAID

## 2023-10-05 ENCOUNTER — RESULT REVIEW (OUTPATIENT)
Age: 63
End: 2023-10-05

## 2023-10-05 ENCOUNTER — APPOINTMENT (OUTPATIENT)
Dept: ULTRASOUND IMAGING | Facility: HOSPITAL | Age: 63
End: 2023-10-05

## 2023-10-05 ENCOUNTER — OUTPATIENT (OUTPATIENT)
Dept: OUTPATIENT SERVICES | Facility: HOSPITAL | Age: 63
LOS: 1 days | End: 2023-10-05
Payer: COMMERCIAL

## 2023-10-05 DIAGNOSIS — Z90.49 ACQUIRED ABSENCE OF OTHER SPECIFIED PARTS OF DIGESTIVE TRACT: Chronic | ICD-10-CM

## 2023-10-05 DIAGNOSIS — Z98.89 OTHER SPECIFIED POSTPROCEDURAL STATES: Chronic | ICD-10-CM

## 2023-10-05 PROCEDURE — G0279: CPT | Mod: 26

## 2023-10-05 PROCEDURE — 76642 ULTRASOUND BREAST LIMITED: CPT | Mod: 26,LT

## 2023-10-05 PROCEDURE — 77066 DX MAMMO INCL CAD BI: CPT | Mod: 26

## 2023-10-05 PROCEDURE — 76642 ULTRASOUND BREAST LIMITED: CPT

## 2023-10-05 PROCEDURE — G0279: CPT

## 2023-10-05 PROCEDURE — 77066 DX MAMMO INCL CAD BI: CPT

## 2023-10-11 ENCOUNTER — APPOINTMENT (OUTPATIENT)
Dept: ULTRASOUND IMAGING | Facility: HOSPITAL | Age: 63
End: 2023-10-11

## 2023-11-08 NOTE — PATIENT PROFILE ADULT - NSPROPOAPRESSUREINJURY_GEN_A_NUR
Detail Level: Detailed Quality 226: Preventive Care And Screening: Tobacco Use: Screening And Cessation Intervention: Patient screened for tobacco use and is an ex/non-smoker Quality 110: Preventive Care And Screening: Influenza Immunization: Influenza Immunization Administered during Influenza season no

## 2024-01-05 ENCOUNTER — APPOINTMENT (OUTPATIENT)
Dept: INTERNAL MEDICINE | Facility: CLINIC | Age: 64
End: 2024-01-05

## 2024-02-20 NOTE — DISCHARGE NOTE PROVIDER - CARE PROVIDER_API CALL
Name band; Magda Brown E  Pulmonary Medicine  [address] [facility] [place] [address]  Phone: [phone]  Fax: [phone]  Established Patient  Scheduled Appointment: 08/03/2022 03:00 PM   Magda Brown  Pulmonary Medicine  [address] [place] [address]  Phone: [phone]  Fax: [phone]  Established Patient  Scheduled Appointment: 08/03/2022 03:00 PM    Rigo Cooper)  Internal Medicine  [address] [place] [address]  Phone: [phone]  Fax: [phone]  Follow Up Time:

## 2024-03-04 NOTE — ED ADULT NURSE NOTE - IS THE PATIENT ABLE TO BE SCREENED?
Body Location Override (Optional - Billing Will Still Be Based On Selected Body Map Location If Applicable): left forearm Detail Level: Detailed Depth Of Biopsy: dermis Was A Bandage Applied: Yes Size Of Lesion In Cm: 0.7 X Size Of Lesion In Cm: 0 Biopsy Type: H and E Biopsy Method: Dermablade Anesthesia Type: 1% lidocaine without epinephrine and a 1:10 solution of 8.4% sodium bicarbonate Anesthesia Volume In Cc: 0.5 Hemostasis: Aluminum Chloride Wound Care: Petrolatum Dressing: bandage Destruction After The Procedure: No Type Of Destruction Used: Curettage Curettage Text: The wound bed was treated with curettage after the biopsy was performed. Cryotherapy Text: The wound bed was treated with cryotherapy after the biopsy was performed. Electrodesiccation Text: The wound bed was treated with electrodesiccation after the biopsy was performed. Electrodesiccation And Curettage Text: The wound bed was treated with electrodesiccation and curettage after the biopsy was performed. Silver Nitrate Text: The wound bed was treated with silver nitrate after the biopsy was performed. Lab: -[id number] Consent: verbal consent obtained Post-Care Instructions: I reviewed with the patient in detail post-care instructions. Patient is to keep the biopsy site dry overnight, and then apply bacitracin twice daily until healed. Patient may apply hydrogen peroxide soaks to remove any crusting. Notification Instructions: Patient will be notified of biopsy results. However, patient instructed to call the office if not contacted within 2 weeks. Billing Type: Third-Party Bill Information: Selecting Yes will display possible errors in your note based on the variables you have selected. This validation is only offered as a suggestion for you. PLEASE NOTE THAT THE VALIDATION TEXT WILL BE REMOVED WHEN YOU FINALIZE YOUR NOTE. IF YOU WANT TO FAX A PRELIMINARY NOTE YOU WILL NEED TO TOGGLE THIS TO 'NO' IF YOU DO NOT WANT IT IN YOUR FAXED NOTE. Yes

## 2024-04-03 NOTE — DISCHARGE NOTE PROVIDER - NPI NUMBER (FOR SYSADMIN USE ONLY) :
[FreeTextEntry1] : 43-year-old woman with history of obesity, HLD, hypertrophic cardiomyopathy (HCM) with outflow tract obstruction, NSVT and family history of sudden death s/p S-ICD on 4/20/18,  VT/VF and ICD shocks on sotalol, presenting for followup.  Since last visit she has remained on sotalol 80mg bid, and Metoprolol 50mg qPM.  Given her significant periods of anxiety about getting shocked again, associated insomnia and depressed mood, Lexapro was restarted at her last visit.  She reports that she feels much better since with reduced anxiety, improved mood and better sleep.   She has not had recent arrhythmia events, but ECG has showed QT prolongation (ECG today with sinus alisha 55 bpm, QTc 500 ms).  Device interrogation today demonstrates normal device function with no recurrent arrhythmias and 30% remaining battery longevity.    She has been noted to have increased ouflow tract gradient. Recent TTE 3/28/24 with resting gradient 82mmHg, up to 209 mmHg with valsalva). She has noted increased exertional dyspnea and fatigue.  She had accidentally taken 100mg Toprol for a couple weeks, and did feel much better with this.   Current medications include metoprolol succinate 50mg qPM, sotalol 80mg BID, Lexapro, torsemide, fluticasone, famotidine   To review She underwent S-ICD implant for primary prevention in 4/2018. In 4/2019 she had an episode of ventricular tachycardia and had appropriate defibrillation from her S-ICD. This occurred during sleep, and she was unaware, but it was identified during subsequent device follow-up.    On 6/22/20, she had another episode of polymorphic VT/VF while sleeping, which resulted in a successful ICD shock. The episode did appear to start with a PVC which occurred on top of a T-wave. She was sleeping when the shock occurred.   Given concern for possible PVC-triggered VF, a 3-day MCOT monitor was performed 7/23-7/26/20 and revealed sinus rhythm with avg HR 64 bpm (range ), rare PVCs (<1%) and NSVT up to 6 beats, and brief runs of SVT up to 4 beats (one symptom triggered episode c/w with brief SVT).   In 5/2022 she again had polymorphic VT (2 episodes in a month period). She was started on sotalol. She had QT prolongation with 120mg bid, and this was changed to 80mg bid.    She did well for several months until 2/5/23 when she had another ICD shock while sleeping - she reports remembering breathing heavy while semi-sleeping before the shock. 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 The QT interval on the preceding rhythm appears to be >50% the RR interval.   Also of note, she reports prior to the recent shock she has had a very irregular and heavy menses, and she also noted that she had irregular menses prior to other ICD shocks. She has been unable to take oral contraceptives in the past, and hysterectomy had been discussed. However, she has recently started having signs of menopause.    Her outflow gradient has increased- she has also been considered for septal ablation/myomectomy, and there has also been concern about the degree of her mitral regurgitation.   Cardiac MRI 3/16/23 demonstrated subtle late gadolinium enhancement along the inferior hinge point of the RV, and septal hypertrophy that could not be accurately quantified due to artifact from the ICD.     [[id number]] [[id number]],[[id number]]

## 2024-04-23 NOTE — DATA REVIEWED
[FreeTextEntry1] : 8/18/22 ([facility]) B/L sMMG/US: scattered areas of fbg density.  MMG: There is a 12 m mass in the LEFT breast 4:00 location 2 cm from the nipple. This corresponds to a cyst on subsequent ultrasound and is stable since 2020.  US: Left breast 4:00 retroareolar location there is a 15 mm cyst. No mammographic or US evidence of malignancy. RECOMMENDATION:  Mammography in 1 year. BI-RADS 2 - Benign  
Alert-The patient is alert, awake and responds to voice. The patient is oriented to time, place, and person. The triage nurse is able to obtain subjective information.

## 2024-06-19 NOTE — PATIENT PROFILE ADULT - NSTRANSFERBELONGINGSRESP_GEN_A_NUR
Pt's spouse, Quynh, returned call and stated that pt is still in the hospital and very weak.  She requested that all future outpatient therapy visits be cancelled at this time as they do not exactly know what will be happening with the pt.  Quynh reported that pt continues to be very weak with difficulty even standing and he may need to go to a Rehab center.  Discussed with spouse that visits will be cancelled and chart will be left open until mid-July.  If they don't call back by that time, then chart will be closed for PT.  Informed spouse that after that time, she could reach out the MD for a new therapy order if they would want pt to return to PT.  Spouse verbalized understanding.    
yes

## 2024-06-21 NOTE — ED ADULT NURSE NOTE - NSFALLRSKOUTCOME_ED_ALL_ED
Left detailed message for Shade at [facility].    Nebulizer was ordered 6/20/2024 by KYLE Delcid  In the nebulizer order it say, \"No, needs to be delivered.\"    Shade was advised to return call to Pulm Clinic if she had any further questions.    Shade returned call reporting [facility] does not mail or deliver medical equipment.    Patient will need to have family member pick it up at [facility].   Universal Safety Interventions

## 2024-08-22 ENCOUNTER — NON-APPOINTMENT (OUTPATIENT)
Age: 64
End: 2024-08-22

## 2024-08-22 NOTE — DATA REVIEWED
[FreeTextEntry1] : 8/18/22 ([facility]) B/L sMMG/US: scattered areas of fbg density.  MMG: There is a 12 m mass in the LEFT breast 4:00 location 2 cm from the nipple. This corresponds to a cyst on subsequent ultrasound and is stable since 2020.  US: Left breast 4:00 retroareolar location there is a 15 mm cyst. No mammographic or US evidence of malignancy. RECOMMENDATION:  Mammography in 1 year. BI-RADS 2 - Benign   10/5/23 ([facility]) B/L DX MMG/targeted L US: almost entirely fatty. LEFT breast 4:00 2cmfn there is a 15 x 6 x 22 mm cyst. This corresponds to the area of focal pain. Further evaluation with therapeutic aspiration can be performed. RECOMMENDATION:  Ultrasound biopsy. BI-RADS 2 - Benign

## 2024-08-22 NOTE — PLAN
[TextEntry] : -B/L DX MMG, B/L US and possible aspiration of left breast cyst as indicated by radiologist in Oct 2024  -If benign, annual breast imaging & RTC in Oct 2025

## 2024-08-22 NOTE — HISTORY OF PRESENT ILLNESS
[FreeTextEntry1] : 64 year old female, patient of Dr. Viktoriya Melgar (PCP), presents for follow up with history of left breast cyst. B/L sMMG/US 8/18/22 (BIRADS-2) revealed a 12 m mass in the LEFT breast 4:00 location 2cmfn, corresponding to a 15mm cyst on subsequent ultrasound with note that it is stable since 2020. Patient reports associated pain at area of cyst for years, worse since most recent imaging in Aug 2022, which prompted her to go to [facility] ED in Feb 2023 for assessment, no intervention was made. Patient reports she takes Ibuprofen to minimal relief. AT LOV in Feb 2023, advised patient trial of OTC EPO (high dose EPO handout was provided), decreasing caffeine intake (includes coffee, tea, chocolate, energy drinks, soda), wearing a sports bra, and OTC Salonpas patches for symptom management. Patient states she only tried OTC Salonpas once to no relief.    Patient denies nipple discharge, skin changes. Denies prior breast surgeries or biopsies.  At LOV 2/17/23, physical exam revealed tenderness to palpation L LOQ -- B/L DX MMG, targeted L US 10/5/23 BIRADS-2 yielded a 2.2cm cyst at LEFT breast 4:00 2cmfn, corresponding to the area of focal pain, with offer of further evaluation with therapeutic aspiration that can be performed, has not been performed.  Patient denies family history of breast cancer. Denies famhx of ovarian cancer. Jeanna Lifetime Risk 5.4%

## 2024-08-22 NOTE — ASSESSMENT
[FreeTextEntry1] : 64 year old female presents for follow up with history of left breast cyst. B/L sMMG/US 8/18/22 (BIRADS-2) revealed a 12 m mass in the LEFT breast 4:00 location 2cmfn, corresponding to a 15mm cyst on subsequent ultrasound with note that it is stable since 2020. Patient reports associated pain at area of cyst for years, worse since most recent imaging in Aug 2022, which prompted her to go to [facility] ED in Feb 2023 for assessment, no intervention was made.   Again discussed with the patient that mastalgia is not a common sign of breast cancer. I reviewed the use of OTC EPO (high dose EPO handout was provided). B/L DX MMG, targeted L US 10/5/23 BIRADS-2 yielded a 2.2cm cyst at LEFT breast 4:00 2cmfn, corresponding to the area of focal pain, with offer of further evaluation with therapeutic aspiration that can be performed, has not been performed.  Jeanna  5.4%. Physical exam again reveals tenderness to palpation lower outer quadrant. Plan for B/L DX MMG, B/L US and possible aspiration of left breast cyst in Oct 2024. If benign, plan for annual imaging and re-examination in Oct 2025. Patient verbalizes understanding and is in agreement with the plan.

## 2024-08-22 NOTE — PHYSICAL EXAM
[Supple] : supple [No Supraclavicular Adenopathy] : no supraclavicular adenopathy [Examined in the supine and seated position] : examined in the supine and seated position [No dominant masses] : no dominant masses in right breast  [No dominant masses] : no dominant masses left breast [No Nipple Retraction] : no left nipple retraction [No Nipple Discharge] : no left nipple discharge [No Axillary Lymphadenopathy] : no left axillary lymphadenopathy [de-identified] : tenderness to palpation lower outer quadrant

## 2024-08-23 ENCOUNTER — APPOINTMENT (OUTPATIENT)
Dept: BREAST CENTER | Facility: CLINIC | Age: 64
End: 2024-08-23

## 2024-08-23 DIAGNOSIS — N60.02 SOLITARY CYST OF LEFT BREAST: ICD-10-CM

## 2024-08-23 DIAGNOSIS — Z12.39 ENCOUNTER FOR OTHER SCREENING FOR MALIGNANT NEOPLASM OF BREAST: ICD-10-CM

## 2024-11-29 NOTE — ED ADULT NURSE NOTE - CINV DISCH TEACH PARTICIP
Bed in lowest position, wheels locked, appropriate side rails in place/Call bell, personal items and telephone in reach/Instruct patient to call for assistance before getting out of bed or chair/Non-slip footwear when patient is out of bed/[place] to call system/Physically safe environment - no spills, clutter or unnecessary equipment/Purposeful Proactive Rounding/Room/bathroom lighting operational, light cord in reach Patient
